# Patient Record
Sex: FEMALE | Race: BLACK OR AFRICAN AMERICAN | NOT HISPANIC OR LATINO | Employment: FULL TIME | URBAN - METROPOLITAN AREA
[De-identification: names, ages, dates, MRNs, and addresses within clinical notes are randomized per-mention and may not be internally consistent; named-entity substitution may affect disease eponyms.]

---

## 2020-09-23 ENCOUNTER — HISTORICAL (OUTPATIENT)
Dept: ADMINISTRATIVE | Facility: HOSPITAL | Age: 35
End: 2020-09-23

## 2020-09-23 LAB
ABS NEUT (OLG): 2.51 X10(3)/MCL (ref 2.1–9.2)
ALBUMIN SERPL-MCNC: 3.6 GM/DL (ref 3.4–5)
ALBUMIN/GLOB SERPL: 0.9 RATIO (ref 1.1–2)
ALP SERPL-CCNC: 89 UNIT/L (ref 45–117)
ALT SERPL-CCNC: 24 UNIT/L (ref 12–78)
AST SERPL-CCNC: 14 UNIT/L (ref 15–37)
BASOPHILS # BLD AUTO: 0 X10(3)/MCL (ref 0–0.2)
BASOPHILS NFR BLD AUTO: 1 %
BILIRUB SERPL-MCNC: 0.1 MG/DL (ref 0.2–1)
BILIRUBIN DIRECT+TOT PNL SERPL-MCNC: <0.1 MG/DL (ref 0–0.2)
BILIRUBIN DIRECT+TOT PNL SERPL-MCNC: ABNORMAL MG/DL
BUN SERPL-MCNC: 10 MG/DL (ref 7–18)
CALCIUM SERPL-MCNC: 9 MG/DL (ref 8.5–10.1)
CHLORIDE SERPL-SCNC: 107 MMOL/L (ref 98–107)
CO2 SERPL-SCNC: 26 MMOL/L (ref 21–32)
CREAT SERPL-MCNC: 0.6 MG/DL (ref 0.6–1.3)
DEPRECATED CALCIDIOL+CALCIFEROL SERPL-MC: 17 NG/ML (ref 30–80)
EOSINOPHIL # BLD AUTO: 0.2 X10(3)/MCL (ref 0–0.9)
EOSINOPHIL NFR BLD AUTO: 5 %
ERYTHROCYTE [DISTWIDTH] IN BLOOD BY AUTOMATED COUNT: 15.8 % (ref 11.5–14.5)
EST. AVERAGE GLUCOSE BLD GHB EST-MCNC: 111 MG/DL
GLOBULIN SER-MCNC: 4.1 GM/ML (ref 2.3–3.5)
GLUCOSE SERPL-MCNC: 86 MG/DL (ref 74–106)
HBA1C MFR BLD: 5.5 % (ref 4.2–6.3)
HCT VFR BLD AUTO: 36.4 % (ref 35–46)
HGB BLD-MCNC: 11.1 GM/DL (ref 12–16)
IMM GRANULOCYTES # BLD AUTO: 0.02 10*3/UL
IMM GRANULOCYTES NFR BLD AUTO: 0 %
LYMPHOCYTES # BLD AUTO: 1.7 X10(3)/MCL (ref 0.6–4.6)
LYMPHOCYTES NFR BLD AUTO: 35 %
MCH RBC QN AUTO: 24.6 PG (ref 26–34)
MCHC RBC AUTO-ENTMCNC: 30.5 GM/DL (ref 31–37)
MCV RBC AUTO: 80.7 FL (ref 80–100)
MONOCYTES # BLD AUTO: 0.4 X10(3)/MCL (ref 0.1–1.3)
MONOCYTES NFR BLD AUTO: 8 %
NEUTROPHILS # BLD AUTO: 2.51 X10(3)/MCL (ref 2.1–9.2)
NEUTROPHILS NFR BLD AUTO: 50 %
PLATELET # BLD AUTO: 282 X10(3)/MCL (ref 130–400)
PMV BLD AUTO: 9.6 FL (ref 7.4–10.4)
POTASSIUM SERPL-SCNC: 4 MMOL/L (ref 3.5–5.1)
PROT SERPL-MCNC: 7.7 GM/DL (ref 6.4–8.2)
RBC # BLD AUTO: 4.51 X10(6)/MCL (ref 4–5.2)
SODIUM SERPL-SCNC: 138 MMOL/L (ref 136–145)
TSH SERPL-ACNC: 0.81 MIU/L (ref 0.36–3.74)
WBC # SPEC AUTO: 5 X10(3)/MCL (ref 4.5–11)

## 2020-10-12 ENCOUNTER — HISTORICAL (OUTPATIENT)
Dept: INTERNAL MEDICINE | Facility: CLINIC | Age: 35
End: 2020-10-12

## 2020-10-12 LAB
APPEARANCE, UA: NORMAL
BACTERIA #/AREA URNS AUTO: ABNORMAL /HPF
BILIRUB UR QL STRIP: NEGATIVE
COLOR UR: YELLOW
GLUCOSE (UA): NEGATIVE
HAV IGM SERPL QL IA: NONREACTIVE
HBV CORE IGM SERPL QL IA: NONREACTIVE
HBV SURFACE AG SERPL QL IA: NONREACTIVE
HCV AB SERPL QL IA: NONREACTIVE
HGB UR QL STRIP: 0.1
HIV 1+2 AB+HIV1 P24 AG SERPL QL IA: NONREACTIVE
HYALINE CASTS #/AREA URNS LPF: ABNORMAL /LPF
KETONES UR QL STRIP: NEGATIVE
LEUKOCYTE ESTERASE UR QL STRIP: NEGATIVE
NITRITE UR QL STRIP: NEGATIVE
PH UR STRIP: 6.5 [PH] (ref 4.5–8)
PROT UR QL STRIP: NEGATIVE
RBC #/AREA URNS AUTO: ABNORMAL /HPF
SP GR UR STRIP: 1.02 (ref 1–1.03)
SQUAMOUS #/AREA URNS LPF: >100 /LPF
T PALLIDUM AB SER QL: NONREACTIVE
UROBILINOGEN UR STRIP-ACNC: NORMAL
WBC #/AREA URNS AUTO: ABNORMAL /HPF

## 2021-02-09 ENCOUNTER — HISTORICAL (OUTPATIENT)
Dept: ADMINISTRATIVE | Facility: HOSPITAL | Age: 36
End: 2021-02-09

## 2021-02-09 LAB
ABS NEUT (OLG): 2.84 X10(3)/MCL (ref 2.1–9.2)
ALBUMIN SERPL-MCNC: 3.7 GM/DL (ref 3.5–5)
ALBUMIN/GLOB SERPL: 1.1 RATIO (ref 1.1–2)
ALP SERPL-CCNC: 73 UNIT/L (ref 40–150)
ALT SERPL-CCNC: 15 UNIT/L (ref 0–55)
AST SERPL-CCNC: 23 UNIT/L (ref 5–34)
BASOPHILS # BLD AUTO: 0 X10(3)/MCL (ref 0–0.2)
BASOPHILS NFR BLD AUTO: 1 %
BILIRUB SERPL-MCNC: 0.5 MG/DL
BILIRUBIN DIRECT+TOT PNL SERPL-MCNC: 0.2 MG/DL (ref 0–0.5)
BILIRUBIN DIRECT+TOT PNL SERPL-MCNC: 0.3 MG/DL (ref 0–0.8)
BUN SERPL-MCNC: 9 MG/DL (ref 7–18.7)
CALCIUM SERPL-MCNC: 9.4 MG/DL (ref 8.4–10.2)
CHLORIDE SERPL-SCNC: 106 MMOL/L (ref 98–107)
CO2 SERPL-SCNC: 27 MMOL/L (ref 22–29)
CREAT SERPL-MCNC: 0.68 MG/DL (ref 0.55–1.02)
DEPRECATED CALCIDIOL+CALCIFEROL SERPL-MC: 16.5 NG/ML (ref 30–80)
EOSINOPHIL # BLD AUTO: 0.2 X10(3)/MCL (ref 0–0.9)
EOSINOPHIL NFR BLD AUTO: 4 %
ERYTHROCYTE [DISTWIDTH] IN BLOOD BY AUTOMATED COUNT: 13.5 % (ref 11.5–14.5)
GLOBULIN SER-MCNC: 3.5 GM/DL (ref 2.4–3.5)
GLUCOSE SERPL-MCNC: 94 MG/DL (ref 74–100)
HCT VFR BLD AUTO: 40.6 % (ref 35–46)
HGB BLD-MCNC: 12.6 GM/DL (ref 12–16)
IMM GRANULOCYTES # BLD AUTO: 0.03 10*3/UL
IMM GRANULOCYTES NFR BLD AUTO: 1 %
LYMPHOCYTES # BLD AUTO: 1.6 X10(3)/MCL (ref 0.6–4.6)
LYMPHOCYTES NFR BLD AUTO: 31 %
MCH RBC QN AUTO: 27 PG (ref 26–34)
MCHC RBC AUTO-ENTMCNC: 31 GM/DL (ref 31–37)
MCV RBC AUTO: 87.1 FL (ref 80–100)
MONOCYTES # BLD AUTO: 0.4 X10(3)/MCL (ref 0.1–1.3)
MONOCYTES NFR BLD AUTO: 8 %
NEUTROPHILS # BLD AUTO: 2.84 X10(3)/MCL (ref 2.1–9.2)
NEUTROPHILS NFR BLD AUTO: 56 %
PLATELET # BLD AUTO: 269 X10(3)/MCL (ref 130–400)
PMV BLD AUTO: 8.7 FL (ref 7.4–10.4)
POTASSIUM SERPL-SCNC: 3.8 MMOL/L (ref 3.5–5.1)
PROT SERPL-MCNC: 7.2 GM/DL (ref 6.4–8.3)
RBC # BLD AUTO: 4.66 X10(6)/MCL (ref 4–5.2)
SODIUM SERPL-SCNC: 140 MMOL/L (ref 136–145)
WBC # SPEC AUTO: 5.1 X10(3)/MCL (ref 4.5–11)

## 2022-04-10 ENCOUNTER — HISTORICAL (OUTPATIENT)
Dept: ADMINISTRATIVE | Facility: HOSPITAL | Age: 37
End: 2022-04-10

## 2022-04-29 VITALS
BODY MASS INDEX: 38.97 KG/M2 | OXYGEN SATURATION: 100 % | SYSTOLIC BLOOD PRESSURE: 119 MMHG | DIASTOLIC BLOOD PRESSURE: 82 MMHG | HEIGHT: 66 IN | WEIGHT: 242.5 LBS

## 2022-09-13 ENCOUNTER — OFFICE VISIT (OUTPATIENT)
Dept: FAMILY MEDICINE | Facility: CLINIC | Age: 37
End: 2022-09-13
Payer: MEDICAID

## 2022-09-13 VITALS
RESPIRATION RATE: 20 BRPM | OXYGEN SATURATION: 100 % | TEMPERATURE: 98 F | HEIGHT: 61 IN | BODY MASS INDEX: 48.9 KG/M2 | DIASTOLIC BLOOD PRESSURE: 89 MMHG | WEIGHT: 259 LBS | HEART RATE: 85 BPM | SYSTOLIC BLOOD PRESSURE: 126 MMHG

## 2022-09-13 DIAGNOSIS — R20.2 NUMBNESS AND TINGLING IN RIGHT HAND: ICD-10-CM

## 2022-09-13 DIAGNOSIS — N92.6 MISSED PERIOD: Primary | ICD-10-CM

## 2022-09-13 DIAGNOSIS — E66.9 OBESITY, UNSPECIFIED CLASSIFICATION, UNSPECIFIED OBESITY TYPE, UNSPECIFIED WHETHER SERIOUS COMORBIDITY PRESENT: ICD-10-CM

## 2022-09-13 DIAGNOSIS — M79.671 BILATERAL FOOT PAIN: ICD-10-CM

## 2022-09-13 DIAGNOSIS — R07.9 CHEST PAIN, UNSPECIFIED TYPE: ICD-10-CM

## 2022-09-13 DIAGNOSIS — M79.672 BILATERAL FOOT PAIN: ICD-10-CM

## 2022-09-13 DIAGNOSIS — I10 HYPERTENSION, UNSPECIFIED TYPE: ICD-10-CM

## 2022-09-13 DIAGNOSIS — G43.109 MIGRAINE WITH AURA AND WITHOUT STATUS MIGRAINOSUS, NOT INTRACTABLE: ICD-10-CM

## 2022-09-13 DIAGNOSIS — Z00.00 ENCOUNTER FOR WELLNESS EXAMINATION: ICD-10-CM

## 2022-09-13 DIAGNOSIS — Z72.0 TOBACCO USER: ICD-10-CM

## 2022-09-13 DIAGNOSIS — R20.0 NUMBNESS AND TINGLING IN RIGHT HAND: ICD-10-CM

## 2022-09-13 PROBLEM — D64.9 ANEMIA: Status: ACTIVE | Noted: 2022-09-13

## 2022-09-13 PROBLEM — D64.9 ANEMIA: Status: RESOLVED | Noted: 2022-09-13 | Resolved: 2022-09-13

## 2022-09-13 LAB
ALBUMIN SERPL-MCNC: 3.6 GM/DL (ref 3.5–5)
ALBUMIN/GLOB SERPL: 1.1 RATIO (ref 1.1–2)
ALP SERPL-CCNC: 68 UNIT/L (ref 40–150)
ALT SERPL-CCNC: 15 UNIT/L (ref 0–55)
APPEARANCE UR: CLEAR
AST SERPL-CCNC: 17 UNIT/L (ref 5–34)
B-HCG UR QL: NEGATIVE
BACTERIA #/AREA URNS AUTO: ABNORMAL /HPF
BASOPHILS # BLD AUTO: 0.04 X10(3)/MCL (ref 0–0.2)
BASOPHILS NFR BLD AUTO: 0.6 %
BILIRUB UR QL STRIP.AUTO: NEGATIVE MG/DL
BILIRUBIN DIRECT+TOT PNL SERPL-MCNC: 0.3 MG/DL
BUN SERPL-MCNC: 9.2 MG/DL (ref 7–18.7)
CALCIUM SERPL-MCNC: 9.3 MG/DL (ref 8.4–10.2)
CHLORIDE SERPL-SCNC: 105 MMOL/L (ref 98–107)
CO2 SERPL-SCNC: 24 MMOL/L (ref 22–29)
COLOR UR AUTO: YELLOW
CREAT SERPL-MCNC: 0.6 MG/DL (ref 0.55–1.02)
CTP QC/QA: YES
DEPRECATED CALCIDIOL+CALCIFEROL SERPL-MC: 20.5 NG/ML (ref 30–80)
EOSINOPHIL # BLD AUTO: 0.22 X10(3)/MCL (ref 0–0.9)
EOSINOPHIL NFR BLD AUTO: 3.4 %
ERYTHROCYTE [DISTWIDTH] IN BLOOD BY AUTOMATED COUNT: 15 % (ref 11.5–17)
EST. AVERAGE GLUCOSE BLD GHB EST-MCNC: 99.7 MG/DL
GFR SERPLBLD CREATININE-BSD FMLA CKD-EPI: >60 MLS/MIN/1.73/M2
GLOBULIN SER-MCNC: 3.3 GM/DL (ref 2.4–3.5)
GLUCOSE SERPL-MCNC: 88 MG/DL (ref 74–100)
GLUCOSE UR QL STRIP.AUTO: NORMAL MG/DL
HAV IGM SERPL QL IA: NONREACTIVE
HBA1C MFR BLD: 5.1 %
HBV CORE IGM SERPL QL IA: NONREACTIVE
HBV SURFACE AG SERPL QL IA: NONREACTIVE
HCT VFR BLD AUTO: 36.2 % (ref 37–47)
HCV AB SERPL QL IA: NONREACTIVE
HGB BLD-MCNC: 11.6 GM/DL (ref 12–16)
HIV 1+2 AB+HIV1 P24 AG SERPL QL IA: NONREACTIVE
HYALINE CASTS #/AREA URNS LPF: ABNORMAL /LPF
IMM GRANULOCYTES # BLD AUTO: 0.03 X10(3)/MCL (ref 0–0.04)
IMM GRANULOCYTES NFR BLD AUTO: 0.5 %
KETONES UR QL STRIP.AUTO: NEGATIVE MG/DL
LEUKOCYTE ESTERASE UR QL STRIP.AUTO: NEGATIVE UNIT/L
LYMPHOCYTES # BLD AUTO: 2.05 X10(3)/MCL (ref 0.6–4.6)
LYMPHOCYTES NFR BLD AUTO: 32 %
MCH RBC QN AUTO: 26 PG (ref 27–31)
MCHC RBC AUTO-ENTMCNC: 32 MG/DL (ref 33–36)
MCV RBC AUTO: 81.2 FL (ref 80–94)
MONOCYTES # BLD AUTO: 0.53 X10(3)/MCL (ref 0.1–1.3)
MONOCYTES NFR BLD AUTO: 8.3 %
MUCOUS THREADS URNS QL MICRO: ABNORMAL /LPF
NEUTROPHILS # BLD AUTO: 3.5 X10(3)/MCL (ref 2.1–9.2)
NEUTROPHILS NFR BLD AUTO: 55.2 %
NITRITE UR QL STRIP.AUTO: NEGATIVE
NRBC BLD AUTO-RTO: 0 %
PH UR STRIP.AUTO: 7.5 [PH]
PLATELET # BLD AUTO: 251 X10(3)/MCL (ref 130–400)
PMV BLD AUTO: 9.4 FL (ref 7.4–10.4)
POTASSIUM SERPL-SCNC: 4.3 MMOL/L (ref 3.5–5.1)
PROT SERPL-MCNC: 6.9 GM/DL (ref 6.4–8.3)
PROT UR QL STRIP.AUTO: ABNORMAL MG/DL
RBC # BLD AUTO: 4.46 X10(6)/MCL (ref 4.2–5.4)
RBC #/AREA URNS AUTO: ABNORMAL /HPF
RBC UR QL AUTO: NEGATIVE UNIT/L
SODIUM SERPL-SCNC: 138 MMOL/L (ref 136–145)
SP GR UR STRIP.AUTO: 1.02
SQUAMOUS #/AREA URNS LPF: ABNORMAL /HPF
T PALLIDUM AB SER QL: NONREACTIVE
T4 FREE SERPL-MCNC: 0.79 NG/DL (ref 0.7–1.48)
TSH SERPL-ACNC: 0.63 UIU/ML (ref 0.35–4.94)
UROBILINOGEN UR STRIP-ACNC: NORMAL MG/DL
VIT B12 SERPL-MCNC: 612 PG/ML (ref 213–816)
WBC # SPEC AUTO: 6.4 X10(3)/MCL (ref 4.5–11.5)
WBC #/AREA URNS AUTO: ABNORMAL /HPF

## 2022-09-13 PROCEDURE — 82306 VITAMIN D 25 HYDROXY: CPT | Performed by: NURSE PRACTITIONER

## 2022-09-13 PROCEDURE — 99385 PR PREVENTIVE VISIT,NEW,18-39: ICD-10-PCS | Mod: S$PBB,,, | Performed by: NURSE PRACTITIONER

## 2022-09-13 PROCEDURE — 3079F DIAST BP 80-89 MM HG: CPT | Mod: CPTII,,, | Performed by: NURSE PRACTITIONER

## 2022-09-13 PROCEDURE — 99215 OFFICE O/P EST HI 40 MIN: CPT | Mod: PBBFAC,PN | Performed by: NURSE PRACTITIONER

## 2022-09-13 PROCEDURE — 3008F PR BODY MASS INDEX (BMI) DOCUMENTED: ICD-10-PCS | Mod: CPTII,,, | Performed by: NURSE PRACTITIONER

## 2022-09-13 PROCEDURE — 3079F PR MOST RECENT DIASTOLIC BLOOD PRESSURE 80-89 MM HG: ICD-10-PCS | Mod: CPTII,,, | Performed by: NURSE PRACTITIONER

## 2022-09-13 PROCEDURE — 1160F RVW MEDS BY RX/DR IN RCRD: CPT | Mod: CPTII,,, | Performed by: NURSE PRACTITIONER

## 2022-09-13 PROCEDURE — 81001 URINALYSIS AUTO W/SCOPE: CPT | Performed by: NURSE PRACTITIONER

## 2022-09-13 PROCEDURE — 80074 ACUTE HEPATITIS PANEL: CPT | Performed by: NURSE PRACTITIONER

## 2022-09-13 PROCEDURE — 80053 COMPREHEN METABOLIC PANEL: CPT | Performed by: NURSE PRACTITIONER

## 2022-09-13 PROCEDURE — 3008F BODY MASS INDEX DOCD: CPT | Mod: CPTII,,, | Performed by: NURSE PRACTITIONER

## 2022-09-13 PROCEDURE — 86780 TREPONEMA PALLIDUM: CPT | Performed by: NURSE PRACTITIONER

## 2022-09-13 PROCEDURE — 81025 URINE PREGNANCY TEST: CPT | Mod: PBBFAC,PN | Performed by: NURSE PRACTITIONER

## 2022-09-13 PROCEDURE — 3074F PR MOST RECENT SYSTOLIC BLOOD PRESSURE < 130 MM HG: ICD-10-PCS | Mod: CPTII,,, | Performed by: NURSE PRACTITIONER

## 2022-09-13 PROCEDURE — 3074F SYST BP LT 130 MM HG: CPT | Mod: CPTII,,, | Performed by: NURSE PRACTITIONER

## 2022-09-13 PROCEDURE — 1159F MED LIST DOCD IN RCRD: CPT | Mod: CPTII,,, | Performed by: NURSE PRACTITIONER

## 2022-09-13 PROCEDURE — 99385 PREV VISIT NEW AGE 18-39: CPT | Mod: S$PBB,,, | Performed by: NURSE PRACTITIONER

## 2022-09-13 PROCEDURE — 99204 OFFICE O/P NEW MOD 45 MIN: CPT | Mod: 25,S$PBB,, | Performed by: NURSE PRACTITIONER

## 2022-09-13 PROCEDURE — 1159F PR MEDICATION LIST DOCUMENTED IN MEDICAL RECORD: ICD-10-PCS | Mod: CPTII,,, | Performed by: NURSE PRACTITIONER

## 2022-09-13 PROCEDURE — 83036 HEMOGLOBIN GLYCOSYLATED A1C: CPT | Performed by: NURSE PRACTITIONER

## 2022-09-13 PROCEDURE — 1160F PR REVIEW ALL MEDS BY PRESCRIBER/CLIN PHARMACIST DOCUMENTED: ICD-10-PCS | Mod: CPTII,,, | Performed by: NURSE PRACTITIONER

## 2022-09-13 PROCEDURE — 87389 HIV-1 AG W/HIV-1&-2 AB AG IA: CPT | Performed by: NURSE PRACTITIONER

## 2022-09-13 PROCEDURE — 82607 VITAMIN B-12: CPT | Performed by: NURSE PRACTITIONER

## 2022-09-13 PROCEDURE — 85025 COMPLETE CBC W/AUTO DIFF WBC: CPT | Performed by: NURSE PRACTITIONER

## 2022-09-13 PROCEDURE — 99204 PR OFFICE/OUTPT VISIT, NEW, LEVL IV, 45-59 MIN: ICD-10-PCS | Mod: 25,S$PBB,, | Performed by: NURSE PRACTITIONER

## 2022-09-13 PROCEDURE — 84443 ASSAY THYROID STIM HORMONE: CPT | Performed by: NURSE PRACTITIONER

## 2022-09-13 PROCEDURE — 36415 COLL VENOUS BLD VENIPUNCTURE: CPT | Performed by: NURSE PRACTITIONER

## 2022-09-13 PROCEDURE — 84439 ASSAY OF FREE THYROXINE: CPT | Performed by: NURSE PRACTITIONER

## 2022-09-13 RX ORDER — SUMATRIPTAN SUCCINATE 25 MG/1
TABLET ORAL
Qty: 25 TABLET | Refills: 1 | Status: SHIPPED | OUTPATIENT
Start: 2022-09-13 | End: 2023-01-30

## 2022-09-13 RX ORDER — NAPROXEN 500 MG/1
500 TABLET ORAL 2 TIMES DAILY
Qty: 60 TABLET | Refills: 3 | OUTPATIENT
Start: 2022-09-13 | End: 2022-10-25

## 2022-09-13 RX ORDER — HYDROCHLOROTHIAZIDE 12.5 MG/1
12.5 TABLET ORAL DAILY
Qty: 30 TABLET | Refills: 11 | Status: SHIPPED | OUTPATIENT
Start: 2022-09-13 | End: 2022-09-29 | Stop reason: DRUGHIGH

## 2022-09-13 NOTE — ASSESSMENT & PLAN NOTE
Trial Imitrex 25mg po repeat in  1hour x 1 if not better  ER for severe HA's not relieved by Imitrex  Headache education provided - including good sleep hygiene, stress management, medication overuse education provided - using more 3 OTC per week may worsen headaches, High intensity interval training has shown to reduce headache frequency. Low carb, high protein has shown to reduce headache frequency. Patient is instructed to keep headache diary.

## 2022-09-13 NOTE — ASSESSMENT & PLAN NOTE
NSAIDs, conservative treatment advised  Discussed plantar fasciitis, heel splints at night, rolling foot on ice.

## 2022-09-13 NOTE — PROGRESS NOTES
"Patient Name: Ida Saxena   : 1985  MRN: [id number]     SUBJECTIVE:  Ida Saxena is a 37 y.o. female here for Establish Care (Est. Pcp, GYN, breast pain, GERMANIA leg pain for a month)      22:  Here to establish care.  Unsure if she is pregnant or not. C/o breast pain bilaterally, needs GYN referral.  Also c/o pain to both legs for a month with swelling.  Was on losartan for bp but stopped taking on her own.  She gets occasional "Chest pulling' and has family history of CAD.  Denies diaphoresis, does report heart racing and skipping beats occasionally.  C/o right wrist pain and numbness to right hand.  Bilateral heel pain and swelling to ankles.  C/o migraine headaches to right side of temple and back of head that occur daily and associated with aura of lightheadedness and photosensitivity.  Denies vomiting.  Takes BC powder, excedrine migraine and advil without relief.        ROS:  Review of Systems   All other systems reviewed and are negative.    ALLERGIES:  Review of patient's allergies indicates:  No Known Allergies     HISTORY:  Past Medical History:   Diagnosis Date    Anemia 2022      History reviewed. No pertinent surgical history.  History reviewed. No pertinent family history.  Social History     Tobacco Use   Smoking Status Former    Types: Cigarettes   Smokeless Tobacco Never        OBJECTIVE:  Vital signs  Vitals:    22 1302   BP: 126/89   BP Location: Left arm   Patient Position: Sitting   BP Method: Medium (Automatic)   Pulse: 85   Resp: 20   Temp: 98.3 °F (36.8 °C)   TempSrc: Oral   SpO2: 100%   Weight: 117.5 kg (259 lb)   Height: 5' 0.6" (1.539 m)        Physical Exam  Constitutional:       General: She is awake.      Appearance: Normal appearance. She is well-developed.   HENT:      Head: Normocephalic and atraumatic.      Right Ear: Hearing, tympanic membrane, ear canal and external ear normal.      Left Ear: Hearing, tympanic membrane, ear canal and external ear " normal.      Nose: Nose normal.      Mouth/Throat:      Lips: Pink.      Mouth: Mucous membranes are moist.      Pharynx: Oropharynx is clear. Uvula midline.   Eyes:      Pupils: Pupils are equal, round, and reactive to light.   Cardiovascular:      Rate and Rhythm: Normal rate and regular rhythm.      Pulses: Normal pulses.      Heart sounds: Normal heart sounds.   Pulmonary:      Effort: Pulmonary effort is normal.      Breath sounds: Normal breath sounds.   Abdominal:      General: Abdomen is flat. Bowel sounds are normal.      Palpations: Abdomen is soft.   Musculoskeletal:         General: Normal range of motion.      Cervical back: Normal range of motion and neck supple.   Skin:     General: Skin is warm and dry.      Capillary Refill: Capillary refill takes less than 2 seconds.   Neurological:      General: No focal deficit present.      Mental Status: She is alert, oriented to person, place, and time and easily aroused. Mental status is at baseline.   Psychiatric:         Mood and Affect: Mood normal.         Behavior: Behavior is cooperative.        ASSESSMENT/PLAN:  1. Missed period  -     POCT Urine Pregnancy    2. Encounter for wellness examination  -     CBC Auto Differential  -     Comprehensive Metabolic Panel  -     Urinalysis  -     Hemoglobin A1C  -     TSH  -     T4, Free  -     Vitamin D  -     Vitamin B12  -     Hepatitis Panel, Acute  -     HIV 1/2 Ag/Ab (4th Gen)  -     SYPHILIS ANTIBODY (WITH REFLEX RPR)  -     Ambulatory referral/consult to Gynecology    3. Chest pain, unspecified type  Assessment & Plan:  Referred to Cardiology  ER for severe chest pain, sob, sweating, arm or neck pain    Orders:  -     Ambulatory referral/consult to Cardiology  -     naproxen (NAPROSYN) 500 MG tablet    4. Migraine with aura and without status migrainosus, not intractable  Assessment & Plan:  Trial Imitrex 25mg po repeat in  1hour x 1 if not better  ER for severe HA's not relieved by Imitrex  Headache  education provided - including good sleep hygiene, stress management, medication overuse education provided - using more 3 OTC per week may worsen headaches, High intensity interval training has shown to reduce headache frequency. Low carb, high protein has shown to reduce headache frequency. Patient is instructed to keep headache diary.      Orders:  -     sumatriptan (IMITREX) 25 MG Tab    5. Hypertension, unspecified type  Assessment & Plan:  HCTZ 12.5mg po daily  rtc 2 weeks for bp check and review of labs.    Orders:  -     hydroCHLOROthiazide (HYDRODIURIL) 12.5 MG Tab    6. Obesity, unspecified classification, unspecified obesity type, unspecified whether serious comorbidity present  Assessment & Plan:  BMI Body mass index is 49.59 kg/m².   Goal BMI <30.  Exercise 5 times a week for 30 minutes per day.  Avoid soda, simple sugars, excessive rice, potatoes or bread. Limit fast foods and fried foods.  Choose complex carbs in moderation (example: green vegetables, beans, oatmeal). Eat plenty of fresh fruits and vegetables with lean meats daily.  Do not skip meals. Eat a balanced portion size.  Avoid fad diets. Consider permanent healthy life style changes.           7. Tobacco user    8. Numbness and tingling in right hand  Assessment & Plan:  Splint at night, given in clinic  NSAIDs for pain      Orders:  -     naproxen (NAPROSYN) 500 MG tablet    9. Bilateral foot pain  Assessment & Plan:  NSAIDs, conservative treatment advised  Discussed plantar fasciitis, heel splints at night, rolling foot on ice.     Orders:  -     naproxen (NAPROSYN) 500 MG tablet        Recent Results (from the past 1008 hour(s))   POCT Urine Pregnancy    Collection Time: 09/13/22  1:16 PM   Result Value Ref Range    POC Preg Test, Ur Negative Negative     Acceptable Yes            Follow Up:  Follow up in about 2 weeks (around 9/27/2022) for BP check, Follow-up, Review of labs.           This note was created with the  assistance of a voice recognition software or phone dictation. There may be transcription errors as a result of using this technology however minimal. Effort has been made to assure accuracy of transcription but any obvious errors or omissions should be clarified with the author of the document

## 2022-09-13 NOTE — ASSESSMENT & PLAN NOTE
BMI Body mass index is 49.59 kg/m².   Goal BMI <30.  Exercise 5 times a week for 30 minutes per day.  Avoid soda, simple sugars, excessive rice, potatoes or bread. Limit fast foods and fried foods.  Choose complex carbs in moderation (example: green vegetables, beans, oatmeal). Eat plenty of fresh fruits and vegetables with lean meats daily.  Do not skip meals. Eat a balanced portion size.  Avoid fad diets. Consider permanent healthy life style changes.

## 2022-09-15 ENCOUNTER — TELEPHONE (OUTPATIENT)
Dept: FAMILY MEDICINE | Facility: CLINIC | Age: 37
End: 2022-09-15
Payer: MEDICAID

## 2022-09-15 LAB — PATH REV: NORMAL

## 2022-09-15 RX ORDER — ASPIRIN 325 MG
50000 TABLET, DELAYED RELEASE (ENTERIC COATED) ORAL
Qty: 12 CAPSULE | Refills: 0 | Status: SHIPPED | OUTPATIENT
Start: 2022-09-15 | End: 2023-01-30

## 2022-09-15 NOTE — PROGRESS NOTES
Vitamin d level was low on labs.  I am sending a prescription for high dose vitamin d to pharmacy.  Educate on increasing foods high in Vitamin D such as fish oil, cod liver oil, salmon, milk fortified with vitamin D.  RX Vitamin D3 35368 IU weekly x 12 weeks.  Complete entire 12 weeks of Vitamin D prescription.  After completion of prescription (12 weeks/3 months), begin taking Vitamin D 2000 I.U. tablets daily (purchase over the counter).  Repeat Vitamin D level as ordered.    3 month average of glucose was 5.1 which is not indicative of diabetes.      Anemia present on labs.  Adding iron to diet either by food or supplementation would be beneficial.  Anemia can contribute to fatigue. We will need to monitor this moving forward.  IF there is a vaginal bleeding issue with menstruation, this can contribute to anemia as well.

## 2022-09-15 NOTE — TELEPHONE ENCOUNTER
----- Message from CAIN Agosto sent at 9/15/2022 11:39 AM CDT -----  Vitamin d level was low on labs.  I am sending a prescription for high dose vitamin d to pharmacy.  Educate on increasing foods high in Vitamin D such as fish oil, cod liver oil, salmon, milk fortified with vitamin D.  RX Vitamin D3 25838 IU weekly x 12 weeks.  Complete entire 12 weeks of Vitamin D prescription.  After completion of prescription (12 weeks/3 months), begin taking Vitamin D 2000 I.U. tablets daily (purchase over the counter).  Repeat Vitamin D level as ordered.    3 month average of glucose was 5.1 which is not indicative of diabetes.      Anemia present on labs.  Adding iron to diet either by food or supplementation would be beneficial.  Anemia can contribute to fatigue. We will need to monitor this moving forward.  IF there is a vaginal bleeding issue with menstruation, this can contribute to anemia as well.

## 2022-09-15 NOTE — TELEPHONE ENCOUNTER
----- Message from CAIN Agosto sent at 9/15/2022 11:39 AM CDT -----  Vitamin d level was low on labs.  I am sending a prescription for high dose vitamin d to pharmacy.  Educate on increasing foods high in Vitamin D such as fish oil, cod liver oil, salmon, milk fortified with vitamin D.  RX Vitamin D3 19915 IU weekly x 12 weeks.  Complete entire 12 weeks of Vitamin D prescription.  After completion of prescription (12 weeks/3 months), begin taking Vitamin D 2000 I.U. tablets daily (purchase over the counter).  Repeat Vitamin D level as ordered.    3 month average of glucose was 5.1 which is not indicative of diabetes.      Anemia present on labs.  Adding iron to diet either by food or supplementation would be beneficial.  Anemia can contribute to fatigue. We will need to monitor this moving forward.  IF there is a vaginal bleeding issue with menstruation, this can contribute to anemia as well.

## 2022-09-15 NOTE — PROGRESS NOTES
Abnormal vitamin d level 20.4 on labs.  Sending prescription to pharmacy for high dose vitamin d to be taken once weekly for 12 weeks.  Will repeat vitamin d level in 3 months.     Nataly

## 2022-09-29 ENCOUNTER — OFFICE VISIT (OUTPATIENT)
Dept: FAMILY MEDICINE | Facility: CLINIC | Age: 37
End: 2022-09-29
Payer: MEDICAID

## 2022-09-29 VITALS
HEART RATE: 95 BPM | DIASTOLIC BLOOD PRESSURE: 90 MMHG | TEMPERATURE: 98 F | OXYGEN SATURATION: 100 % | BODY MASS INDEX: 42.75 KG/M2 | RESPIRATION RATE: 20 BRPM | WEIGHT: 266 LBS | HEIGHT: 66 IN | SYSTOLIC BLOOD PRESSURE: 136 MMHG

## 2022-09-29 DIAGNOSIS — I10 HYPERTENSION, UNSPECIFIED TYPE: ICD-10-CM

## 2022-09-29 DIAGNOSIS — Z00.00 ENCOUNTER FOR WELLNESS EXAMINATION: Primary | ICD-10-CM

## 2022-09-29 PROCEDURE — 3080F PR MOST RECENT DIASTOLIC BLOOD PRESSURE >= 90 MM HG: ICD-10-PCS | Mod: CPTII,,, | Performed by: NURSE PRACTITIONER

## 2022-09-29 PROCEDURE — 99214 PR OFFICE/OUTPT VISIT, EST, LEVL IV, 30-39 MIN: ICD-10-PCS | Mod: S$PBB,,, | Performed by: NURSE PRACTITIONER

## 2022-09-29 PROCEDURE — 1160F PR REVIEW ALL MEDS BY PRESCRIBER/CLIN PHARMACIST DOCUMENTED: ICD-10-PCS | Mod: CPTII,,, | Performed by: NURSE PRACTITIONER

## 2022-09-29 PROCEDURE — 99214 OFFICE O/P EST MOD 30 MIN: CPT | Mod: S$PBB,,, | Performed by: NURSE PRACTITIONER

## 2022-09-29 PROCEDURE — 1159F PR MEDICATION LIST DOCUMENTED IN MEDICAL RECORD: ICD-10-PCS | Mod: CPTII,,, | Performed by: NURSE PRACTITIONER

## 2022-09-29 PROCEDURE — 3075F PR MOST RECENT SYSTOLIC BLOOD PRESS GE 130-139MM HG: ICD-10-PCS | Mod: CPTII,,, | Performed by: NURSE PRACTITIONER

## 2022-09-29 PROCEDURE — 1160F RVW MEDS BY RX/DR IN RCRD: CPT | Mod: CPTII,,, | Performed by: NURSE PRACTITIONER

## 2022-09-29 PROCEDURE — 3008F BODY MASS INDEX DOCD: CPT | Mod: CPTII,,, | Performed by: NURSE PRACTITIONER

## 2022-09-29 PROCEDURE — 3008F PR BODY MASS INDEX (BMI) DOCUMENTED: ICD-10-PCS | Mod: CPTII,,, | Performed by: NURSE PRACTITIONER

## 2022-09-29 PROCEDURE — 1159F MED LIST DOCD IN RCRD: CPT | Mod: CPTII,,, | Performed by: NURSE PRACTITIONER

## 2022-09-29 PROCEDURE — 3080F DIAST BP >= 90 MM HG: CPT | Mod: CPTII,,, | Performed by: NURSE PRACTITIONER

## 2022-09-29 PROCEDURE — 3075F SYST BP GE 130 - 139MM HG: CPT | Mod: CPTII,,, | Performed by: NURSE PRACTITIONER

## 2022-09-29 PROCEDURE — 99213 OFFICE O/P EST LOW 20 MIN: CPT | Mod: PBBFAC,PN | Performed by: NURSE PRACTITIONER

## 2022-09-29 RX ORDER — HYDROCHLOROTHIAZIDE 25 MG/1
25 TABLET ORAL DAILY
Qty: 30 TABLET | Refills: 6 | Status: SHIPPED | OUTPATIENT
Start: 2022-09-29 | End: 2023-08-09 | Stop reason: SDUPTHER

## 2022-09-29 NOTE — ASSESSMENT & PLAN NOTE
Increase HCTZ to 25mg po daily.   RTC 1 month for recheck BP  Low Sodium Diet (Dash Diet - less than 2 grams of sodium per day).  Monitor Blood Pressure daily and log. Report any consistent numbers greater than 140/90.  Smoking Cessation encouraged to aid in BP reduction.  Maintain healthy weight with goal BMI <30. Exercise 30 minutes per day 5 days per week

## 2022-09-29 NOTE — PROGRESS NOTES
"Patient Name: Ida Saxena   : 1985  MRN: [id number]     SUBJECTIVE:  Ida Saxena is a 37 y.o. female here for Follow-up (2 wk f/u B/P check)    22:  FU BP. I added HCTZ to regimen last visit and told to return in 2 weeks for BP check.     22:  Here to establish care.  Unsure if she is pregnant or not. C/o breast pain bilaterally, needs GYN referral.  Also c/o pain to both legs for a month with swelling.  Was on losartan for bp but stopped taking on her own.  She gets occasional "Chest pulling' and has family history of CAD.  Denies diaphoresis, does report heart racing and skipping beats occasionally.  C/o right wrist pain and numbness to right hand.  Bilateral heel pain and swelling to ankles.  C/o migraine headaches to right side of temple and back of head that occur daily and associated with aura of lightheadedness and photosensitivity.  Denies vomiting.  Takes BC powder, excedrine migraine and advil without relief.        ALLERGIES: Review of patient's allergies indicates:  No Known Allergies      ROS:  Review of Systems   Constitutional: Negative.    HENT: Negative.     Eyes: Negative.    Respiratory: Negative.     Cardiovascular: Negative.    Gastrointestinal: Negative.    Genitourinary: Negative.    Musculoskeletal: Negative.    Skin: Negative.    Neurological: Negative.    Endo/Heme/Allergies: Negative.    Psychiatric/Behavioral: Negative.     All other systems reviewed and are negative.      OBJECTIVE:  Vital signs  Vitals:    22 0926   BP: (!) 136/90   BP Location: Left arm   Patient Position: Sitting   BP Method: Large (Manual)   Pulse: 95   Resp: 20   Temp: 98.4 °F (36.9 °C)   TempSrc: Oral   SpO2: 100%   Weight: 120.7 kg (266 lb)   Height: 5' 6" (1.676 m)      Body mass index is 42.93 kg/m².    PHYSICAL EXAM:   Physical Exam  Vitals and nursing note reviewed.   HENT:      Head: Normocephalic and atraumatic.   Cardiovascular:      Rate and Rhythm: Normal rate and " regular rhythm.      Pulses: Normal pulses.      Heart sounds: Normal heart sounds.   Pulmonary:      Breath sounds: Normal breath sounds.   Musculoskeletal:         General: Normal range of motion.      Cervical back: Normal range of motion.   Skin:     General: Skin is warm and dry.   Neurological:      General: No focal deficit present.      Mental Status: She is alert and oriented to person, place, and time.   Psychiatric:         Mood and Affect: Mood normal.        ASSESSMENT/PLAN:  1. Hypertension, unspecified type  Overview:  BP Readings from Last 3 Encounters:   09/29/22 (!) 136/90   09/13/22 126/89   10/20/20 119/82         Assessment & Plan:  Increase HCTZ to 25mg po daily.   RTC 1 month for recheck BP  Low Sodium Diet (Dash Diet - less than 2 grams of sodium per day).  Monitor Blood Pressure daily and log. Report any consistent numbers greater than 140/90.  Smoking Cessation encouraged to aid in BP reduction.  Maintain healthy weight with goal BMI <30. Exercise 30 minutes per day 5 days per week      Orders:  -     hydroCHLOROthiazide (HYDRODIURIL) 25 MG tablet         RESULTS:  Recent Results (from the past 1008 hour(s))   POCT Urine Pregnancy    Collection Time: 09/13/22  1:16 PM   Result Value Ref Range    POC Preg Test, Ur Negative Negative     Acceptable Yes    Comprehensive Metabolic Panel    Collection Time: 09/13/22  1:59 PM   Result Value Ref Range    Sodium Level 138 136 - 145 mmol/L    Potassium Level 4.3 3.5 - 5.1 mmol/L    Chloride 105 98 - 107 mmol/L    Carbon Dioxide 24 22 - 29 mmol/L    Glucose Level 88 74 - 100 mg/dL    Blood Urea Nitrogen 9.2 7.0 - 18.7 mg/dL    Creatinine 0.60 0.55 - 1.02 mg/dL    Calcium Level Total 9.3 8.4 - 10.2 mg/dL    Protein Total 6.9 6.4 - 8.3 gm/dL    Albumin Level 3.6 3.5 - 5.0 gm/dL    Globulin 3.3 2.4 - 3.5 gm/dL    Albumin/Globulin Ratio 1.1 1.1 - 2.0 ratio    Bilirubin Total 0.3 <=1.5 mg/dL    Alkaline Phosphatase 68 40 - 150 unit/L     Alanine Aminotransferase 15 0 - 55 unit/L    Aspartate Aminotransferase 17 5 - 34 unit/L    eGFR >60 mls/min/1.73/m2   Urinalysis    Collection Time: 09/13/22  1:59 PM   Result Value Ref Range    Color, UA Yellow Yellow, Colorless, Other, Clear    Appearance, UA Clear Clear    Specific Gravity, UA 1.024     pH, UA 7.5 5.0, 5.5, 6.0, 6.5, 7.0, 7.5, 8.0, 8.5    Protein, UA Trace (A) Negative, 300  mg/dL    Glucose, UA Normal Negative, Normal mg/dL    Ketones, UA Negative Negative, +1, +2, +3, +4, +5, >=160, >=80 mg/dL    Blood, UA Negative Negative unit/L    Bilirubin, UA Negative Negative mg/dL    Urobilinogen, UA Normal 0.2, 1.0, Normal mg/dL    Nitrites, UA Negative Negative    Leukocyte Esterase, UA Negative Negative, 75  unit/L    WBC, UA 0-5 None Seen, 0-2, 3-5, 0-5 /HPF    Bacteria, UA Trace (A) None Seen /HPF    Squamous Epithelial Cells, UA Many (A) None Seen /HPF    Mucous, UA Trace (A) None Seen /LPF    Hyaline Casts, UA 0-2 (A) None Seen /lpf    RBC, UA 0-5 None Seen, 0-2, 3-5, 0-5 /HPF   Hemoglobin A1C    Collection Time: 09/13/22  1:59 PM   Result Value Ref Range    Hemoglobin A1c 5.1 <=7.0 %    Estimated Average Glucose 99.7 mg/dL   TSH    Collection Time: 09/13/22  1:59 PM   Result Value Ref Range    Thyroid Stimulating Hormone 0.6313 0.3500 - 4.9400 uIU/mL   T4, Free    Collection Time: 09/13/22  1:59 PM   Result Value Ref Range    Thyroxine Free 0.79 0.70 - 1.48 ng/dL   Vitamin D    Collection Time: 09/13/22  1:59 PM   Result Value Ref Range    Vit D 25 OH 20.5 (L) 30.0 - 80.0 ng/mL   Vitamin B12    Collection Time: 09/13/22  1:59 PM   Result Value Ref Range    Vitamin B12 Level 612 213 - 816 pg/mL   Hepatitis Panel, Acute    Collection Time: 09/13/22  1:59 PM   Result Value Ref Range    Hepatitis A IgM Nonreactive Nonreactive    Hepatitis B Core IgM Nonreactive Nonreactive    Hepatitis B Surface Antigen Nonreactive Nonreactive    Hepatitis C Antibody Nonreactive Nonreactive   HIV 1/2 Ag/Ab (4th Gen)     Collection Time: 09/13/22  1:59 PM   Result Value Ref Range    HIV Nonreactive Nonreactive   SYPHILIS ANTIBODY (WITH REFLEX RPR)    Collection Time: 09/13/22  1:59 PM   Result Value Ref Range    Syphilis Antibody Nonreactive Nonreactive, Equivocal   CBC with Differential    Collection Time: 09/13/22  1:59 PM   Result Value Ref Range    WBC 6.4 4.5 - 11.5 x10(3)/mcL    RBC 4.46 4.20 - 5.40 x10(6)/mcL    Hgb 11.6 (L) 12.0 - 16.0 gm/dL    Hct 36.2 (L) 37.0 - 47.0 %    MCV 81.2 80.0 - 94.0 fL    MCH 26.0 (L) 27.0 - 31.0 pg    MCHC 32.0 (L) 33.0 - 36.0 mg/dL    RDW 15.0 11.5 - 17.0 %    Platelet 251 130 - 400 x10(3)/mcL    MPV 9.4 7.4 - 10.4 fL    Neut % 55.2 %    Lymph % 32.0 %    Mono % 8.3 %    Eos % 3.4 %    Basophil % 0.6 %    Lymph # 2.05 0.6 - 4.6 x10(3)/mcL    Neut # 3.5 2.1 - 9.2 x10(3)/mcL    Mono # 0.53 0.1 - 1.3 x10(3)/mcL    Eos # 0.22 0 - 0.9 x10(3)/mcL    Baso # 0.04 0 - 0.2 x10(3)/mcL    IG# 0.03 0 - 0.04 x10(3)/mcL    IG% 0.5 %    NRBC% 0.0 %   Pathologist Interpretation    Collection Time: 09/13/22  1:59 PM   Result Value Ref Range    Pathology Review       No serological evidence of recent or past hepatitis A, B, or C infection.    Brigid Carcamo MD           Follow Up:  Follow up in about 1 month (around 10/29/2022) for Follow-up, BP check.         This note was created with the assistance of a voice recognition software or phone dictation. There may be transcription errors as a result of using this technology however minimal. Effort has been made to assure accuracy of transcription but any obvious errors or omissions should be clarified with the author of the document

## 2022-10-12 ENCOUNTER — TELEPHONE (OUTPATIENT)
Dept: FAMILY MEDICINE | Facility: CLINIC | Age: 37
End: 2022-10-12
Payer: MEDICAID

## 2022-10-12 DIAGNOSIS — Z12.31 ENCOUNTER FOR SCREENING MAMMOGRAM FOR MALIGNANT NEOPLASM OF BREAST: Primary | ICD-10-CM

## 2022-10-12 NOTE — TELEPHONE ENCOUNTER
----- Message from Nika Warren sent at 10/12/2022  1:17 PM CDT -----  Regarding: Phone call request  Pt is requesting a phone call from nurse

## 2022-10-14 NOTE — TELEPHONE ENCOUNTER
Please inform this patient that she is not 40 years of age.  Her insurance may  not cover the MMG unless she has strong family history of breast cancer.     Nataly        Orders Placed This Encounter   Procedures    Mammo Digital Screening Bilat     Standing Status:   Future     Standing Expiration Date:   1/14/2023     Order Specific Question:   Reason for Exam:     Answer:   screening     Order Specific Question:   Is the patient pregnant?     Answer:   No     Order Specific Question:   Has patient had radiation?     Answer:   No     Order Specific Question:   Has the patient had chemotherapy?     Answer:   No     Order Specific Question:   May the Radiologist modify the order per protocol to meet the clinical needs of the patient?     Answer:   Yes     Order Specific Question:   Release to patient     Answer:   Immediate     Order Specific Question:   [facility] ONLY: Performing/Resulting Location     Answer:   [facility]

## 2022-10-15 ENCOUNTER — HOSPITAL ENCOUNTER (EMERGENCY)
Facility: HOSPITAL | Age: 37
Discharge: HOME | End: 2022-10-16
Attending: EMERGENCY MEDICINE
Payer: MEDICAID

## 2022-10-15 VITALS
HEIGHT: 66 IN | WEIGHT: 266 LBS | TEMPERATURE: 98 F | SYSTOLIC BLOOD PRESSURE: 125 MMHG | OXYGEN SATURATION: 100 % | HEART RATE: 97 BPM | DIASTOLIC BLOOD PRESSURE: 93 MMHG | RESPIRATION RATE: 20 BRPM | BODY MASS INDEX: 42.75 KG/M2

## 2022-10-15 DIAGNOSIS — S05.01XA ABRASION OF RIGHT CORNEA, INITIAL ENCOUNTER: Primary | ICD-10-CM

## 2022-10-15 PROCEDURE — 99283 EMERGENCY DEPT VISIT LOW MDM: CPT

## 2022-10-15 RX ORDER — TETRACAINE HYDROCHLORIDE 5 MG/ML
2 SOLUTION OPHTHALMIC
Status: COMPLETED | OUTPATIENT
Start: 2022-10-16 | End: 2022-10-15

## 2022-10-15 RX ADMIN — TETRACAINE HYDROCHLORIDE 2 DROP: 5 SOLUTION OPHTHALMIC at 11:10

## 2022-10-15 NOTE — Clinical Note
"Ida Guerrero" Hemanth was seen and treated in our emergency department on 10/15/2022.  She may return to work on 10/17/2022.       If you have any questions or concerns, please don't hesitate to call.       RN    "

## 2022-10-16 PROCEDURE — 25000003 PHARM REV CODE 250: Performed by: EMERGENCY MEDICINE

## 2022-10-16 RX ORDER — ERYTHROMYCIN 5 MG/G
OINTMENT OPHTHALMIC
Status: COMPLETED | OUTPATIENT
Start: 2022-10-16 | End: 2022-10-16

## 2022-10-16 RX ADMIN — ERYTHROMYCIN: 5 OINTMENT OPHTHALMIC at 12:10

## 2022-10-16 RX ADMIN — FLUORESCEIN SODIUM 1 EACH: 1 STRIP OPHTHALMIC at 12:10

## 2022-10-16 NOTE — ED PROVIDER NOTES
Encounter Date: 10/15/2022       History     Chief Complaint   Patient presents with    Eye Pain     Feels like something is in my right eye for past 5 hours       37-year-old female states she feels like symptoms in her right eye for the last 5 hours.  No vision complaints.  She thinks maybe it is makeup or an eyelash in her eye.  No history of eye problems in the past and she does not wear contact lenses. Pt is intoxicated and plans to go out after leaving here      Review of patient's allergies indicates:  No Known Allergies  Past Medical History:   Diagnosis Date    Anemia 09/13/2022    Hypertension      History reviewed. No pertinent surgical history.  History reviewed. No pertinent family history.  Social History     Tobacco Use    Smoking status: Former     Types: Cigarettes    Smokeless tobacco: Never   Substance Use Topics    Alcohol use: Yes    Drug use: Never     Review of Systems   Eyes:  Positive for pain.   All other systems reviewed and are negative.    Physical Exam     Initial Vitals [10/15/22 2346]   BP Pulse Resp Temp SpO2   (!) 125/93 97 20 98.2 °F (36.8 °C) 100 %      MAP       --         Physical Exam    Constitutional: She appears well-developed and well-nourished.   Eyes: EOM are normal. Pupils are equal, round, and reactive to light.   Right eye photophobia on arrival relieved by tetracaine.  On fluorescein exam, small abrasion seen at 9:00 a.m. and 12:00 p.m. to the cornea.  No foreign body seen but she has a lot of mascara eye makeup debris in her eyelashes.  Eyelid was inverted and no foreign body found.  No foreign body found under the lower lid.  On slit-lamp exam, I a again visualized the 2 small abrasions.  Anterior chamber is clear, no foreign body found.  Eye was irrigated with 20 mL of normal saline and erythromycin ointment was placed.         ED Course   Procedures  Labs Reviewed - No data to display       Imaging Results    None          Medications   fluorescein ophthalmic  strip 1 each (1 each Both Eyes Given 10/16/22 0000)   TETRAcaine HCl (PF) 0.5 % Drop 2 drop (2 drops Right Eye Given 10/15/22 4376)   erythromycin 5 mg/gram (0.5 %) ophthalmic ointment ( Right Eye Given 10/16/22 0019)                              Clinical Impression:   Final diagnoses:  [S05.01XA] Abrasion of right cornea, initial encounter (Primary)        ED Disposition Condition    Discharge Stable          ED Prescriptions    None       Follow-up Information    None          Ameena North MD  10/16/22 9955

## 2022-10-16 NOTE — DISCHARGE INSTRUCTIONS
When you get home, remove your eye makeup carefully.  Flush your eye with the saline provided and reapply the antibiotic ointment before bedtime.  Your eye should be feeling much better by tomorrow and if not fully improved by Monday, follow-up with an ophthalmologist.

## 2022-10-25 ENCOUNTER — HOSPITAL ENCOUNTER (EMERGENCY)
Facility: HOSPITAL | Age: 37
Discharge: HOME | End: 2022-10-25
Attending: EMERGENCY MEDICINE
Payer: MEDICAID

## 2022-10-25 VITALS
WEIGHT: 265 LBS | OXYGEN SATURATION: 100 % | BODY MASS INDEX: 42.59 KG/M2 | HEART RATE: 83 BPM | RESPIRATION RATE: 18 BRPM | HEIGHT: 66 IN | SYSTOLIC BLOOD PRESSURE: 146 MMHG | TEMPERATURE: 98 F | DIASTOLIC BLOOD PRESSURE: 93 MMHG

## 2022-10-25 DIAGNOSIS — M54.9 BACK PAIN, UNSPECIFIED BACK LOCATION, UNSPECIFIED BACK PAIN LATERALITY, UNSPECIFIED CHRONICITY: Primary | ICD-10-CM

## 2022-10-25 DIAGNOSIS — R52 PAIN: ICD-10-CM

## 2022-10-25 PROCEDURE — 25000003 PHARM REV CODE 250: Performed by: EMERGENCY MEDICINE

## 2022-10-25 PROCEDURE — 99284 EMERGENCY DEPT VISIT MOD MDM: CPT | Mod: 25

## 2022-10-25 PROCEDURE — 96372 THER/PROPH/DIAG INJ SC/IM: CPT | Performed by: EMERGENCY MEDICINE

## 2022-10-25 PROCEDURE — 63600175 PHARM REV CODE 636 W HCPCS: Performed by: EMERGENCY MEDICINE

## 2022-10-25 RX ORDER — MORPHINE SULFATE 4 MG/ML
4 INJECTION, SOLUTION INTRAMUSCULAR; INTRAVENOUS
Status: COMPLETED | OUTPATIENT
Start: 2022-10-25 | End: 2022-10-25

## 2022-10-25 RX ORDER — NAPROXEN 500 MG/1
500 TABLET ORAL 2 TIMES DAILY WITH MEALS
Qty: 30 TABLET | Refills: 0 | Status: SHIPPED | OUTPATIENT
Start: 2022-10-25 | End: 2022-10-28

## 2022-10-25 RX ORDER — ONDANSETRON 4 MG/1
4 TABLET, ORALLY DISINTEGRATING ORAL
Status: COMPLETED | OUTPATIENT
Start: 2022-10-25 | End: 2022-10-25

## 2022-10-25 RX ADMIN — MORPHINE SULFATE 4 MG: 4 INJECTION INTRAVENOUS at 01:10

## 2022-10-25 RX ADMIN — ONDANSETRON 4 MG: 4 TABLET, ORALLY DISINTEGRATING ORAL at 01:10

## 2022-10-25 RX ADMIN — MORPHINE SULFATE 4 MG: 4 INJECTION INTRAVENOUS at 02:10

## 2022-10-25 NOTE — ED PROVIDER NOTES
Encounter Date: 10/25/2022       History     Chief Complaint   Patient presents with    Shortness of Breath    Back Pain     37-year-old female states she is having pain in her midback bilaterally in the broth strap area of the posterior thorax in the central half of her back (50%).  She states the pain began this morning and is worse when she takes a deep breath and also when she twists or turns.  She states she does not have a cough.  She states she has never had pain in this area of her back before.  She denies trauma.  She denies fevers chills and sweats.    Review of patient's allergies indicates:  No Known Allergies  Past Medical History:   Diagnosis Date    Anemia 09/13/2022    Hypertension      No past surgical history on file.  No family history on file.  Social History     Tobacco Use    Smoking status: Former     Types: Cigarettes    Smokeless tobacco: Never   Substance Use Topics    Alcohol use: Yes    Drug use: Never     Review of Systems   Constitutional:  Negative for fever.   HENT:  Negative for sore throat.    Respiratory:  Negative for shortness of breath.    Cardiovascular:  Negative for chest pain.   Gastrointestinal:  Negative for nausea.   Genitourinary:  Negative for dysuria.   Musculoskeletal:  Positive for back pain.   Skin:  Negative for rash.   Neurological:  Negative for weakness.   Hematological:  Does not bruise/bleed easily.   All other systems reviewed and are negative.    Physical Exam     Initial Vitals [10/25/22 0025]   BP Pulse Resp Temp SpO2   (!) 147/110 83 18 98.4 °F (36.9 °C) 100 %      MAP       --         Physical Exam    Nursing note and vitals reviewed.  Constitutional: She appears well-developed. No distress.   Patient is comfortable appearing sitting up in the bed.  She is in no acute distress.  She is nontoxic appearing.   HENT:   Mouth/Throat: Oropharynx is clear and moist.   Eyes: Conjunctivae are normal.   Cardiovascular:  Normal rate, regular rhythm and normal heart  sounds.     Exam reveals no gallop and no friction rub.       No murmur heard.  Pulmonary/Chest: Breath sounds normal. No respiratory distress. She has no wheezes. She has no rhonchi. She has no rales.   Abdominal: Abdomen is soft. Bowel sounds are normal. She exhibits no distension. There is no abdominal tenderness. There is no guarding.   Musculoskeletal:         General: No edema.      Comments: There is tenderness to the soft tissues of the mid back at the T7 area in the paraspinous and adjacent lateral areas.  The tenderness stops at approximately the area of the mid scapular line bilaterally.  There is no one area of her back that is more tender than the other.  There is no erythema.  There is no point spine tenderness.     Lymphadenopathy:     She has no cervical adenopathy.   Neurological: She is alert. She displays a negative Romberg sign. Coordination and gait normal. GCS eye subscore is 4. GCS verbal subscore is 5. GCS motor subscore is 6.   Skin: Skin is warm.   Psychiatric: She has a normal mood and affect.       ED Course   Procedures  Labs Reviewed - No data to display       Imaging Results              X-Ray Chest 1 View (In process)  Result time 10/25/22 01:23:48                  X-Rays:   Independently Interpreted Readings:   Other Readings:  Lungs clear.  No infiltrate/ptx/mass.  Mediastinum is normal.  NAF  Medications   morphine injection 4 mg (4 mg Intramuscular Given 10/25/22 0124)   ondansetron disintegrating tablet 4 mg (4 mg Oral Given 10/25/22 0124)   morphine injection 4 mg (4 mg Intramuscular Given 10/25/22 0202)     Medical Decision Making:   Differential Diagnosis:   Aortic aneurysm, aortic dissection, pulmonary embolus, pneumothorax, vertebral fracture, infection, musculoskeletal pain  Clinical Tests:   Radiological Study: Reviewed  ED Management:  I accessed  aware and her overdose risk score is 250.  Patient has reproducible posterior thoracic wall tenderness.  Will check x-ray.   There is no indication for a pulmonary embolus workup and some body who is having bilateral pain with an oxygen saturation of 100% on room air in the absence of tachycardia and tachypnea.  And aortic aneurysm/dissection would not results and reproducible posterior thoracic wall tenderness.  Palpation of the area of complaint completely reproduces her pain.  She discussed having chronic pain issues after having an epidural years ago and states that muscle relaxers do not help with her pain.  She states she does not tolerate them well.    I discussed the results of the chest x-ray with the patient who states that she is feeling better that she still is having some pain.  She would like 1 more dose of pain medication before she leaves.  She appears very relaxed and is showing no sign of pain physically.                        Clinical Impression:   Final diagnoses:  [R52] Pain  [M54.9] Back pain, unspecified back location, unspecified back pain laterality, unspecified chronicity - musculoskeletal (Primary)        ED Disposition Condition    Discharge Stable          ED Prescriptions       Medication Sig Dispense Start Date End Date Auth. Provider    naproxen (NAPROSYN) 500 MG tablet Take 1 tablet (500 mg total) by mouth 2 (two) times daily with meals. 30 tablet 10/25/2022 -- Gorge Aragon Jr., MD          Follow-up Information       Follow up With Specialties Details Why Contact Info    CAIN Agosto Nurse Practitioner In 2 days  [address] [place] [id number]  [phone]               Gorge Aragon Jr., MD  10/25/22 0202       Gorge Aragon Jr., MD  10/25/22 0203       Gorge Aragon Jr., MD  10/25/22 8945

## 2022-10-27 ENCOUNTER — HOSPITAL ENCOUNTER (OUTPATIENT)
Dept: RADIOLOGY | Facility: HOSPITAL | Age: 37
Discharge: HOME | End: 2022-10-27
Attending: NURSE PRACTITIONER
Payer: MEDICAID

## 2022-10-27 DIAGNOSIS — Z12.31 ENCOUNTER FOR SCREENING MAMMOGRAM FOR MALIGNANT NEOPLASM OF BREAST: ICD-10-CM

## 2022-10-27 PROCEDURE — 77067 SCR MAMMO BI INCL CAD: CPT | Mod: 26,,, | Performed by: RADIOLOGY

## 2022-10-27 PROCEDURE — 77067 SCR MAMMO BI INCL CAD: CPT | Mod: TC

## 2022-10-27 PROCEDURE — 77063 BREAST TOMOSYNTHESIS BI: CPT | Mod: 26,,, | Performed by: RADIOLOGY

## 2022-10-27 PROCEDURE — 77067 MAMMO DIGITAL SCREENING BILAT WITH TOMO: ICD-10-PCS | Mod: 26,,, | Performed by: RADIOLOGY

## 2022-10-27 PROCEDURE — 77063 MAMMO DIGITAL SCREENING BILAT WITH TOMO: ICD-10-PCS | Mod: 26,,, | Performed by: RADIOLOGY

## 2022-10-28 ENCOUNTER — TELEPHONE (OUTPATIENT)
Dept: BARIATRICS | Facility: CLINIC | Age: 37
End: 2022-10-28
Payer: MEDICAID

## 2022-10-28 ENCOUNTER — OFFICE VISIT (OUTPATIENT)
Dept: FAMILY MEDICINE | Facility: CLINIC | Age: 37
End: 2022-10-28
Payer: MEDICAID

## 2022-10-28 VITALS
SYSTOLIC BLOOD PRESSURE: 114 MMHG | BODY MASS INDEX: 41.3 KG/M2 | RESPIRATION RATE: 20 BRPM | HEART RATE: 97 BPM | HEIGHT: 66 IN | WEIGHT: 257 LBS | DIASTOLIC BLOOD PRESSURE: 88 MMHG | OXYGEN SATURATION: 100 % | TEMPERATURE: 98 F

## 2022-10-28 DIAGNOSIS — R35.0 URINARY FREQUENCY: ICD-10-CM

## 2022-10-28 DIAGNOSIS — R07.9 CHEST PAIN, UNSPECIFIED TYPE: ICD-10-CM

## 2022-10-28 DIAGNOSIS — I10 HYPERTENSION, UNSPECIFIED TYPE: Primary | ICD-10-CM

## 2022-10-28 DIAGNOSIS — G43.109 MIGRAINE WITH AURA AND WITHOUT STATUS MIGRAINOSUS, NOT INTRACTABLE: ICD-10-CM

## 2022-10-28 LAB
APPEARANCE UR: ABNORMAL
BACTERIA #/AREA URNS AUTO: ABNORMAL /HPF
BILIRUB UR QL STRIP.AUTO: NEGATIVE MG/DL
COLOR UR AUTO: ABNORMAL
GLUCOSE UR QL STRIP.AUTO: NORMAL MG/DL
HYALINE CASTS #/AREA URNS LPF: ABNORMAL /LPF
KETONES UR QL STRIP.AUTO: NEGATIVE MG/DL
LEUKOCYTE ESTERASE UR QL STRIP.AUTO: NEGATIVE UNIT/L
MUCOUS THREADS URNS QL MICRO: ABNORMAL /LPF
NITRITE UR QL STRIP.AUTO: NEGATIVE
PH UR STRIP.AUTO: 6 [PH]
PROT UR QL STRIP.AUTO: NEGATIVE MG/DL
RBC #/AREA URNS AUTO: ABNORMAL /HPF
RBC UR QL AUTO: ABNORMAL UNIT/L
SP GR UR STRIP.AUTO: 1.02
SQUAMOUS #/AREA URNS LPF: ABNORMAL /HPF
UROBILINOGEN UR STRIP-ACNC: NORMAL MG/DL
WBC #/AREA URNS AUTO: ABNORMAL /HPF

## 2022-10-28 PROCEDURE — 1159F PR MEDICATION LIST DOCUMENTED IN MEDICAL RECORD: ICD-10-PCS | Mod: CPTII,,, | Performed by: NURSE PRACTITIONER

## 2022-10-28 PROCEDURE — 99214 PR OFFICE/OUTPT VISIT, EST, LEVL IV, 30-39 MIN: ICD-10-PCS | Mod: S$PBB,,, | Performed by: NURSE PRACTITIONER

## 2022-10-28 PROCEDURE — 3079F PR MOST RECENT DIASTOLIC BLOOD PRESSURE 80-89 MM HG: ICD-10-PCS | Mod: CPTII,,, | Performed by: NURSE PRACTITIONER

## 2022-10-28 PROCEDURE — 3074F SYST BP LT 130 MM HG: CPT | Mod: CPTII,,, | Performed by: NURSE PRACTITIONER

## 2022-10-28 PROCEDURE — 3079F DIAST BP 80-89 MM HG: CPT | Mod: CPTII,,, | Performed by: NURSE PRACTITIONER

## 2022-10-28 PROCEDURE — 99214 OFFICE O/P EST MOD 30 MIN: CPT | Mod: S$PBB,,, | Performed by: NURSE PRACTITIONER

## 2022-10-28 PROCEDURE — 81001 URINALYSIS AUTO W/SCOPE: CPT | Performed by: NURSE PRACTITIONER

## 2022-10-28 PROCEDURE — 1159F MED LIST DOCD IN RCRD: CPT | Mod: CPTII,,, | Performed by: NURSE PRACTITIONER

## 2022-10-28 PROCEDURE — 99214 OFFICE O/P EST MOD 30 MIN: CPT | Mod: PBBFAC,PN | Performed by: NURSE PRACTITIONER

## 2022-10-28 PROCEDURE — 1160F RVW MEDS BY RX/DR IN RCRD: CPT | Mod: CPTII,,, | Performed by: NURSE PRACTITIONER

## 2022-10-28 PROCEDURE — 3074F PR MOST RECENT SYSTOLIC BLOOD PRESSURE < 130 MM HG: ICD-10-PCS | Mod: CPTII,,, | Performed by: NURSE PRACTITIONER

## 2022-10-28 PROCEDURE — 1160F PR REVIEW ALL MEDS BY PRESCRIBER/CLIN PHARMACIST DOCUMENTED: ICD-10-PCS | Mod: CPTII,,, | Performed by: NURSE PRACTITIONER

## 2022-10-28 RX ORDER — AMLODIPINE BESYLATE 5 MG/1
5 TABLET ORAL DAILY
Qty: 30 TABLET | Refills: 11 | Status: SHIPPED | OUTPATIENT
Start: 2022-10-28 | End: 2022-10-28 | Stop reason: CLARIF

## 2022-10-28 RX ORDER — ALBUTEROL SULFATE 90 UG/1
AEROSOL, METERED RESPIRATORY (INHALATION)
COMMUNITY
Start: 2022-07-27 | End: 2023-08-09

## 2022-10-28 NOTE — TELEPHONE ENCOUNTER
Called and LVM for [facility]. Informed pt in message that we do not accept Medicaid, but [facility] does accept it. Provided their phone number ([phone]) to pt in message.

## 2022-10-28 NOTE — PROGRESS NOTES
"Patient Name: Ida Saxena   : 1985  MRN: [id number]     SUBJECTIVE:  Ida Saxena is a 37 y.o. female here for Follow-up (1 month f/u for B/P )      10/28/22: FU BP. C/o frequent urination. BP today is 114/88. C/o frequent urination.  Will check UA. Vitamin D level was 20 in September.  Due for recheck once high dose vitamin d is done (December). Has contacted [facility]-they do not accept Medicaid.   C/o migraine headaches to right side of temple and back of head that occur daily and associated with aura of lightheadedness and photosensitivity.  Denies vomiting.  Takes BC powder, excedrine migraine and advil without relief. Headaches are worsening in intensity and frequency in last 2 months.  Mother with hx aneurysm requiring craniotomy.  Has had eye exam and vision is without impairment, not needing correction.    22:  FU BP. I added HCTZ to regimen last visit and told to return in 2 weeks for BP check.     22:  Here to establish care.  Unsure if she is pregnant or not. C/o breast pain bilaterally, needs GYN referral.  Also c/o pain to both legs for a month with swelling.  Was on losartan for bp but stopped taking on her own.  She gets occasional "Chest pulling' and has family history of CAD.  Denies diaphoresis, does report heart racing and skipping beats occasionally.  C/o right wrist pain and numbness to right hand.  Bilateral heel pain and swelling to ankles.  C/o migraine headaches to right side of temple and back of head that occur daily and associated with aura of lightheadedness and photosensitivity.  Denies vomiting.  Takes BC powder, excedrine migraine and advil without relief.            ALLERGIES: Review of patient's allergies indicates:  No Known Allergies      ROS:  Review of Systems   Constitutional: Negative.    HENT: Negative.     Eyes: Negative.    Respiratory: Negative.     Cardiovascular: Negative.    Gastrointestinal: Negative.  " "  Genitourinary:  Positive for frequency.   Musculoskeletal: Negative.    Skin: Negative.    Neurological:  Positive for dizziness and headaches. Negative for seizures, loss of consciousness and weakness.   Endo/Heme/Allergies: Negative.    Psychiatric/Behavioral: Negative.     All other systems reviewed and are negative.      OBJECTIVE:  Vital signs  Vitals:    10/28/22 0713   BP: 114/88   BP Location: Right arm   Patient Position: Sitting   BP Method: Large (Automatic)   Pulse: 97   Resp: 20   Temp: 98.1 °F (36.7 °C)   TempSrc: Oral   SpO2: 100%   Weight: 116.6 kg (257 lb)   Height: 5' 6" (1.676 m)      Body mass index is 41.48 kg/m².    PHYSICAL EXAM:   Physical Exam  Vitals and nursing note reviewed.   HENT:      Head: Normocephalic and atraumatic.   Cardiovascular:      Rate and Rhythm: Normal rate and regular rhythm.      Pulses: Normal pulses.      Heart sounds: Normal heart sounds.   Pulmonary:      Breath sounds: Normal breath sounds.   Musculoskeletal:         General: Normal range of motion.      Cervical back: Normal range of motion.   Skin:     General: Skin is warm and dry.   Neurological:      General: No focal deficit present.      Mental Status: She is alert and oriented to person, place, and time.      GCS: GCS eye subscore is 4. GCS verbal subscore is 5. GCS motor subscore is 6.      Cranial Nerves: Cranial nerves 2-12 are intact.      Sensory: Sensation is intact.      Motor: Motor function is intact.      Coordination: Coordination is intact.      Gait: Gait is intact.      Deep Tendon Reflexes: Reflexes are normal and symmetric.   Psychiatric:         Mood and Affect: Mood normal.        ASSESSMENT/PLAN:  1. Hypertension, unspecified type  Overview:  BP Readings from Last 3 Encounters:   10/28/22 114/88   10/25/22 (!) 146/93   10/15/22 (!) 125/93         Assessment & Plan:  Continue HCTZ 25mg po daily.   Low Sodium Diet (Dash Diet - less than 2 grams of sodium per day).  Monitor Blood Pressure " daily and log. Report any consistent numbers greater than 140/90.  Smoking Cessation encouraged to aid in BP reduction.  Maintain healthy weight with goal BMI <30. Exercise 30 minutes per day 5 days per week        2. Urinary frequency  -     Urinalysis, Reflex to Urine Culture Urine, Clean Catch    3. Chest pain, unspecified type  Assessment & Plan:  Pending Cardiology appt next week.  Discussed not missing this appt as this is important to r/o cardiac disease. Er precautions for chest pain discussed.       4. Migraine with aura and without status migrainosus, not intractable  Assessment & Plan:  MRI ordered due to fly hx aneurysms and increase in HA frequency and intensity.   ER precautions discussed.      Orders:  -     MRI Brain W WO Contrast; Future; Expected date: 10/30/2022    Other orders  -     Discontinue: amLODIPine (NORVASC) 5 MG tablet; Take 1 tablet (5 mg total) by mouth once daily.  Dispense: 30 tablet; Refill: 11         RESULTS:  Recent Results (from the past 1008 hour(s))   Urinalysis, Reflex to Urine Culture Urine, Clean Catch    Collection Time: 10/28/22  7:42 AM    Specimen: Urine   Result Value Ref Range    Color, UA Light-Yellow Yellow, Light-Yellow, Dark Yellow, [place], Straw    Appearance, UA Turbid (A) Clear    Specific Gravity, UA 1.020     pH, UA 6.0 5.0 - 8.5    Protein, UA Negative Negative mg/dL    Glucose, UA Normal Negative, Normal mg/dL    Ketones, UA Negative Negative mg/dL    Blood, UA Trace (A) Negative unit/L    Bilirubin, UA Negative Negative mg/dL    Urobilinogen, UA Normal 0.2, 1.0, Normal mg/dL    Nitrites, UA Negative Negative    Leukocyte Esterase, UA Negative Negative unit/L    WBC, UA 0-5 None Seen, 0-2, 3-5, 0-5 /HPF    Bacteria, UA Trace (A) None Seen /HPF    Squamous Epithelial Cells, UA Moderate (A) None Seen /HPF    Mucous, UA Trace (A) None Seen /LPF    Hyaline Casts, UA None Seen None Seen /lpf    RBC, UA 0-5 None Seen, 0-2, 3-5, 0-5, >100 /HPF         Follow  Up:  Follow up in about 4 months (around 2/28/2023) for BP check, Follow-up.         This note was created with the assistance of a voice recognition software or phone dictation. There may be transcription errors as a result of using this technology however minimal. Effort has been made to assure accuracy of transcription but any obvious errors or omissions should be clarified with the author of the document

## 2022-10-28 NOTE — TELEPHONE ENCOUNTER
----- Message from India Jett sent at 10/28/2022  3:50 PM CDT -----  Regarding: Appt  Contact: pt [phone]  Pt is calling to check status of Bariatric appt, emailed medical records and watched video, please call

## 2022-10-28 NOTE — TELEPHONE ENCOUNTER
Provided patient with number to [facility].

## 2022-10-28 NOTE — ASSESSMENT & PLAN NOTE
Continue HCTZ 25mg po daily.   Low Sodium Diet (Dash Diet - less than 2 grams of sodium per day).  Monitor Blood Pressure daily and log. Report any consistent numbers greater than 140/90.  Smoking Cessation encouraged to aid in BP reduction.  Maintain healthy weight with goal BMI <30. Exercise 30 minutes per day 5 days per week

## 2022-10-28 NOTE — ASSESSMENT & PLAN NOTE
Pending Cardiology appt next week.  Discussed not missing this appt as this is important to r/o cardiac disease. Er precautions for chest pain discussed.

## 2022-10-30 ENCOUNTER — TELEPHONE (OUTPATIENT)
Dept: FAMILY MEDICINE | Facility: CLINIC | Age: 37
End: 2022-10-30
Payer: MEDICAID

## 2022-10-30 NOTE — ASSESSMENT & PLAN NOTE
MRI ordered due to fly hx aneurysms and increase in HA frequency and intensity.   ER precautions discussed.

## 2022-10-31 ENCOUNTER — TELEPHONE (OUTPATIENT)
Dept: FAMILY MEDICINE | Facility: CLINIC | Age: 37
End: 2022-10-31
Payer: MEDICAID

## 2022-11-08 ENCOUNTER — TELEPHONE (OUTPATIENT)
Dept: FAMILY MEDICINE | Facility: CLINIC | Age: 37
End: 2022-11-08
Payer: MEDICAID

## 2022-11-08 NOTE — TELEPHONE ENCOUNTER
Patient calling states that she needs documentation from you that she quit smoking since March so that she is able to have her weight loss surgery. Please advise.

## 2022-11-09 ENCOUNTER — TELEPHONE (OUTPATIENT)
Dept: FAMILY MEDICINE | Facility: CLINIC | Age: 37
End: 2022-11-09

## 2022-11-10 ENCOUNTER — HOSPITAL ENCOUNTER (EMERGENCY)
Facility: HOSPITAL | Age: 37
Discharge: HOME | End: 2022-11-10
Attending: FAMILY MEDICINE
Payer: MEDICAID

## 2022-11-10 VITALS
HEIGHT: 67 IN | DIASTOLIC BLOOD PRESSURE: 95 MMHG | TEMPERATURE: 98 F | RESPIRATION RATE: 18 BRPM | SYSTOLIC BLOOD PRESSURE: 137 MMHG | BODY MASS INDEX: 34.36 KG/M2 | WEIGHT: 218.94 LBS | HEART RATE: 60 BPM | OXYGEN SATURATION: 100 %

## 2022-11-10 DIAGNOSIS — R07.9 CHEST PAIN: ICD-10-CM

## 2022-11-10 DIAGNOSIS — R51.9 NONINTRACTABLE HEADACHE, UNSPECIFIED CHRONICITY PATTERN, UNSPECIFIED HEADACHE TYPE: Primary | ICD-10-CM

## 2022-11-10 LAB
ALBUMIN SERPL-MCNC: 3.8 GM/DL (ref 3.5–5)
ALBUMIN/GLOB SERPL: 1.2 RATIO (ref 1.1–2)
ALP SERPL-CCNC: 83 UNIT/L (ref 40–150)
ALT SERPL-CCNC: 16 UNIT/L (ref 0–55)
AST SERPL-CCNC: 20 UNIT/L (ref 5–34)
B-HCG UR QL: NEGATIVE
BASOPHILS # BLD AUTO: 0.04 X10(3)/MCL (ref 0–0.2)
BASOPHILS NFR BLD AUTO: 0.7 %
BILIRUBIN DIRECT+TOT PNL SERPL-MCNC: 0.2 MG/DL
BNP BLD-MCNC: <10 PG/ML
BUN SERPL-MCNC: 11.9 MG/DL (ref 7–18.7)
CALCIUM SERPL-MCNC: 9.4 MG/DL (ref 8.4–10.2)
CHLORIDE SERPL-SCNC: 105 MMOL/L (ref 98–107)
CO2 SERPL-SCNC: 25 MMOL/L (ref 22–29)
CREAT SERPL-MCNC: 0.75 MG/DL (ref 0.55–1.02)
CTP QC/QA: YES
EOSINOPHIL # BLD AUTO: 0.24 X10(3)/MCL (ref 0–0.9)
EOSINOPHIL NFR BLD AUTO: 4.2 %
ERYTHROCYTE [DISTWIDTH] IN BLOOD BY AUTOMATED COUNT: 14.1 % (ref 11.5–17)
GFR SERPLBLD CREATININE-BSD FMLA CKD-EPI: >60 MLS/MIN/1.73/M2
GLOBULIN SER-MCNC: 3.2 GM/DL (ref 2.4–3.5)
GLUCOSE SERPL-MCNC: 97 MG/DL (ref 74–100)
HCT VFR BLD AUTO: 39.6 % (ref 37–47)
HGB BLD-MCNC: 11.9 GM/DL (ref 12–16)
IMM GRANULOCYTES # BLD AUTO: 0.03 X10(3)/MCL (ref 0–0.04)
IMM GRANULOCYTES NFR BLD AUTO: 0.5 %
LYMPHOCYTES # BLD AUTO: 2.2 X10(3)/MCL (ref 0.6–4.6)
LYMPHOCYTES NFR BLD AUTO: 38.7 %
MAGNESIUM SERPL-MCNC: 2 MG/DL (ref 1.6–2.6)
MCH RBC QN AUTO: 25.1 PG (ref 27–31)
MCHC RBC AUTO-ENTMCNC: 30.1 MG/DL (ref 33–36)
MCV RBC AUTO: 83.5 FL (ref 80–94)
MONOCYTES # BLD AUTO: 0.5 X10(3)/MCL (ref 0.1–1.3)
MONOCYTES NFR BLD AUTO: 8.8 %
NEUTROPHILS # BLD AUTO: 2.7 X10(3)/MCL (ref 2.1–9.2)
NEUTROPHILS NFR BLD AUTO: 47.1 %
NRBC BLD AUTO-RTO: 0 %
PLATELET # BLD AUTO: 287 X10(3)/MCL (ref 130–400)
PMV BLD AUTO: 9.4 FL (ref 7.4–10.4)
POTASSIUM SERPL-SCNC: 4.1 MMOL/L (ref 3.5–5.1)
PROT SERPL-MCNC: 7 GM/DL (ref 6.4–8.3)
RBC # BLD AUTO: 4.74 X10(6)/MCL (ref 4.2–5.4)
SODIUM SERPL-SCNC: 140 MMOL/L (ref 136–145)
TROPONIN I SERPL-MCNC: <0.01 NG/ML (ref 0–0.04)
WBC # SPEC AUTO: 5.7 X10(3)/MCL (ref 4.5–11.5)

## 2022-11-10 PROCEDURE — 63600175 PHARM REV CODE 636 W HCPCS: Performed by: FAMILY MEDICINE

## 2022-11-10 PROCEDURE — 96374 THER/PROPH/DIAG INJ IV PUSH: CPT

## 2022-11-10 PROCEDURE — 81025 URINE PREGNANCY TEST: CPT | Performed by: FAMILY MEDICINE

## 2022-11-10 PROCEDURE — 93005 ELECTROCARDIOGRAM TRACING: CPT

## 2022-11-10 PROCEDURE — 96361 HYDRATE IV INFUSION ADD-ON: CPT

## 2022-11-10 PROCEDURE — 80053 COMPREHEN METABOLIC PANEL: CPT | Performed by: FAMILY MEDICINE

## 2022-11-10 PROCEDURE — 85025 COMPLETE CBC W/AUTO DIFF WBC: CPT | Performed by: FAMILY MEDICINE

## 2022-11-10 PROCEDURE — 84484 ASSAY OF TROPONIN QUANT: CPT | Performed by: FAMILY MEDICINE

## 2022-11-10 PROCEDURE — 99285 EMERGENCY DEPT VISIT HI MDM: CPT | Mod: 25

## 2022-11-10 PROCEDURE — 83735 ASSAY OF MAGNESIUM: CPT | Performed by: FAMILY MEDICINE

## 2022-11-10 PROCEDURE — 83880 ASSAY OF NATRIURETIC PEPTIDE: CPT | Performed by: FAMILY MEDICINE

## 2022-11-10 PROCEDURE — 25000003 PHARM REV CODE 250: Performed by: FAMILY MEDICINE

## 2022-11-10 RX ORDER — DIPHENHYDRAMINE HCL 25 MG
25 CAPSULE ORAL
Status: COMPLETED | OUTPATIENT
Start: 2022-11-10 | End: 2022-11-10

## 2022-11-10 RX ORDER — SUMATRIPTAN 50 MG/1
50 TABLET, FILM COATED ORAL
Qty: 28 TABLET | Refills: 0 | Status: SHIPPED | OUTPATIENT
Start: 2022-11-10 | End: 2023-01-30 | Stop reason: SDUPTHER

## 2022-11-10 RX ORDER — METOCLOPRAMIDE HYDROCHLORIDE 5 MG/ML
10 INJECTION INTRAMUSCULAR; INTRAVENOUS
Status: COMPLETED | OUTPATIENT
Start: 2022-11-10 | End: 2022-11-10

## 2022-11-10 RX ADMIN — SODIUM CHLORIDE 1000 ML: 9 INJECTION, SOLUTION INTRAVENOUS at 03:11

## 2022-11-10 RX ADMIN — DIPHENHYDRAMINE HYDROCHLORIDE 25 MG: 25 CAPSULE ORAL at 03:11

## 2022-11-10 RX ADMIN — METOCLOPRAMIDE 10 MG: 5 INJECTION, SOLUTION INTRAMUSCULAR; INTRAVENOUS at 03:11

## 2022-11-10 NOTE — Clinical Note
"Ida Guerrero" Hemanth was seen and treated in our emergency department on 11/10/2022.  She may return to work on 11/12/2022.       If you have any questions or concerns, please don't hesitate to call.       RN    "

## 2022-11-10 NOTE — ED PROVIDER NOTES
Name: Ida Saxena   Age: 37 y.o.  Sex: female    Chief complaint:   Chief Complaint   Patient presents with    Headache     Pt reports intermittent migraines x 1 month. States unresolved w/ over the counter meds.       Patient arrived with: Private  History obtained from: Patient    Subjective:   37-year-old female with a history of hypertension that presents to the emergency department for headache and chest pain.  Patient both the symptoms have been going on for months.  She doctor primary care doctor and is scheduled for an echocardiogram and MRI in the future.  Headache is a dull sensation that is unchanged compared to the previous months.  Headache is worse with any light.  Complains of intermittent blurry vision, no double vision.  Denies ringing in the ears, numbness/tingling/weakness in arms or legs.  Denies any falls or trauma.  Also complains of mild left-sided chest pain that is sharp, waxes and wanes, nonradiating.  Intermittent shortness of breath.  No syncopal episode.  Denies fever, chills, sweats, cough, sore throat, runny nose, abdominal pain, vomiting, diarrhea, dysuria, hematuria.    Past Medical History:   Diagnosis Date    Anemia 09/13/2022    Hypertension      No past surgical history on file.  Social History     Socioeconomic History    Marital status: Single   Tobacco Use    Smoking status: Former     Types: Cigarettes    Smokeless tobacco: Never   Substance and Sexual Activity    Alcohol use: Yes    Drug use: Never    Sexual activity: Yes     Review of patient's allergies indicates:  No Known Allergies     Review of Systems   Constitutional:  Negative for diaphoresis and fever.   HENT:  Negative for congestion and sore throat.    Eyes:  Negative for pain and discharge.   Respiratory:  Positive for shortness of breath. Negative for cough.    Cardiovascular:  Positive for chest pain. Negative for palpitations.   Gastrointestinal:  Negative for diarrhea and vomiting.   Genitourinary:   Negative for dysuria and hematuria.   Musculoskeletal:  Negative for back pain and myalgias.   Skin:  Negative for itching and rash.   Neurological:  Positive for headaches. Negative for weakness.        Objective:     Vitals:    11/10/22 0533   BP: (!) 137/95   Pulse: 60   Resp: 18   Temp:         Physical Exam  Constitutional:       Appearance: She is not toxic-appearing.   HENT:      Head: Normocephalic and atraumatic.   Cardiovascular:      Rate and Rhythm: Regular rhythm.      Pulses: Normal pulses.   Pulmonary:      Effort: Pulmonary effort is normal.      Breath sounds: Normal breath sounds.   Abdominal:      General: Abdomen is flat. Bowel sounds are normal.      Palpations: Abdomen is soft.   Musculoskeletal:         General: No deformity. Normal range of motion.      Cervical back: Normal range of motion and neck supple.   Skin:     General: Skin is warm and dry.   Neurological:      General: No focal deficit present.      Mental Status: She is alert and oriented to person, place, and time. Mental status is at baseline.      Cranial Nerves: No cranial nerve deficit.      Motor: No weakness.   Psychiatric:         Mood and Affect: Mood normal.         Behavior: Behavior normal.        Records:  Nursing records and triage records reviewed  Prior records reviewed    Medical decision making:   Presents to the emergency department for chronic headache and chest pain.  Patient is nontoxic appearing.  Vital signs are within normal limits.  Physical exam within normal limits with no signs of obvious neurological deficits.  Will get cardiac workup for further evaluation.  Patient will get a migraine cocktail for symptomatic management.    ED Course as of 11/10/22 0552   u Nov 10, 2022   0438 EKG is nonischemic with prolonged QTC of 501 [RK]   0518 CXR normal [RK]   0526 Troponin negative.  No leukocytosis, anemia, thrombocytopenia.  No severe electrolyte abnormality.  No BRIAN hyperglycemia.  Liver enzymes are  within normal limits. [RK]   0552 On re-evaluation patient she was starting to feel better.  She will be discharged with a prescription for sumatriptan.  We discussed return precautions and I listed them of the discharge instructions.  Patient understands the plan, no further questions, felt safe for discharge. [RK]      ED Course User Index  [RK] Jesse Deleon MD          EKG:  ECG Results              EKG 12-lead (Preliminary result)  Result time 11/10/22 04:38:46      ED Interpretation by Jesse Deleon MD (11/10/22 04:38:46, [facility], Emergency Medicine)    Normal sinus rhythm at a rate of 74, no signs of ST elevation or depression, normal axis, normal intervals with a prolonged QTC of 501                                     Procedures       Diagnosis:  Final diagnoses:  [R07.9] Chest pain  [R51.9] Nonintractable headache, unspecified chronicity pattern, unspecified headache type (Primary)     ED Prescriptions       Medication Sig Dispense Start Date End Date Auth. Provider    sumatriptan (IMITREX) 50 MG tablet Take 1 tablet (50 mg total) by mouth every 6 to 8 hours as needed for Migraine. 28 tablet 11/10/2022 -- Jesse Deleon MD          Follow-up Information       Follow up With Specialties Details Why Contact Info    Dentist  Call  As soon as possible             Jesse Deleon M.D.  Emergency Medicine Physician     (Please note that this chart was completed via voice to text dictation. There may be typographical errors or substitutions that are unintentional, or uncorrected. Every attempt was made to proofread the chart prior to completion. If there are any questions, please contact the physician for final clarification).         Jesse Deleon MD  11/10/22 0552

## 2022-11-10 NOTE — DISCHARGE INSTRUCTIONS
You came into the emergency department with chest pain and a migraine.    Your labs, EKGs, chest x-ray were all normal.    You will be discharged with medication which help you with your migraines.      You should follow-up with a primary care doctor for reevaluation.    They may recommend further cardiac testing such as an ultrasound of your heart or stress test.    Return to the emergency department if you have worsening chest pain, shortness of breath, feel lightheaded or dizzy to the point where you may pass out, headaches that are not normal, double vision, constant ringing in the ears, numbness/tingling/weakness in arms or legs.

## 2022-11-15 ENCOUNTER — TELEPHONE (OUTPATIENT)
Dept: FAMILY MEDICINE | Facility: CLINIC | Age: 37
End: 2022-11-15

## 2022-11-22 ENCOUNTER — HOSPITAL ENCOUNTER (OUTPATIENT)
Dept: RADIOLOGY | Facility: HOSPITAL | Age: 37
Discharge: HOME | End: 2022-11-22
Attending: NURSE PRACTITIONER
Payer: MEDICAID

## 2022-11-22 DIAGNOSIS — G43.109 MIGRAINE WITH AURA AND WITHOUT STATUS MIGRAINOSUS, NOT INTRACTABLE: ICD-10-CM

## 2022-11-22 PROCEDURE — 25500020 PHARM REV CODE 255

## 2022-11-22 PROCEDURE — 70553 MRI BRAIN STEM W/O & W/DYE: CPT | Mod: TC

## 2022-11-22 PROCEDURE — A9577 INJ MULTIHANCE: HCPCS

## 2022-11-22 RX ADMIN — GADOBENATE DIMEGLUMINE 20 ML: 529 INJECTION, SOLUTION INTRAVENOUS at 08:11

## 2022-11-30 ENCOUNTER — TELEPHONE (OUTPATIENT)
Dept: FAMILY MEDICINE | Facility: CLINIC | Age: 37
End: 2022-11-30
Payer: MEDICAID

## 2023-01-13 ENCOUNTER — TELEPHONE (OUTPATIENT)
Dept: FAMILY MEDICINE | Facility: CLINIC | Age: 38
End: 2023-01-13

## 2023-01-19 ENCOUNTER — TELEPHONE (OUTPATIENT)
Dept: FAMILY MEDICINE | Facility: CLINIC | Age: 38
End: 2023-01-19
Payer: MEDICAID

## 2023-01-20 DIAGNOSIS — I89.1 LYMPHANGITIS OF GROIN: Primary | ICD-10-CM

## 2023-01-20 NOTE — PROGRESS NOTES
Please notify pt that we will order US of both groin areas to better visualize.  We may need to order a CT pelvis with contrast if this does not show us what we need to see.  I called [facility] and spoke to radiologist on phone to specifically find out what would be needed to classify what this is and what needs to be ordered to work it up. Scheduling should be in touch to schedule this US.     I have sent medications and/or lab orders in for this patient.  Please notify the patient.     Orders Placed This Encounter   Procedures    US Pelvis Limited Non OB     Lower ext venous US showed right groin lymph node and left groin nodules, need better visualization/classification of this area.     Standing Status:   Future     Standing Expiration Date:   2/20/2023     Order Specific Question:   [facility] ONLY: Performing/Resulting Location     Answer:   [facility]     Order Specific Question:   Reason for Exam:     Answer:   left groin nodules on venous ultrasound, need better classification,  soft tissue US of left and right groin needed     Order Specific Question:   May the Radiologist modify the order per protocol to meet the clinical needs of the patient?     Answer:   Yes     Order Specific Question:   Release to patient     Answer:   Immediate

## 2023-01-23 ENCOUNTER — TELEPHONE (OUTPATIENT)
Dept: FAMILY MEDICINE | Facility: CLINIC | Age: 38
End: 2023-01-23

## 2023-01-26 ENCOUNTER — HOSPITAL ENCOUNTER (OUTPATIENT)
Dept: RADIOLOGY | Facility: HOSPITAL | Age: 38
Discharge: HOME | End: 2023-01-26
Attending: NURSE PRACTITIONER
Payer: MEDICAID

## 2023-01-26 DIAGNOSIS — I89.1 LYMPHANGITIS OF GROIN: ICD-10-CM

## 2023-01-26 PROCEDURE — 76857 US EXAM PELVIC LIMITED: CPT | Mod: TC

## 2023-01-27 NOTE — PROGRESS NOTES
1/27/23:  US performed 1/26/23 after a venous US ordered to check for DVT/flow showed right groin lymph node 2.14cm x 1.03cm and left groin nodules noted as cystic in appearance, 1.06cm x 0.70cm x 0.87cm, x 0.73cm.      Results of soft tissue groin US for better specificity showed  the following:  CLINICAL HISTORY:  left groin nodules on venous ultrasound, need better classification,  soft tissue US of left and right groin needed; Lymphangitis     TECHNIQUE:  Limited ultrasound of the pelvis with attention to the groin.     COMPARISON:  CT abdomen pelvis 01/14/2020     FINDINGS:  Morphologically normal bilateral inguinal chain lymph nodes are seen.  Largest lymph node on the right measures 2.2 x 1.4 x 0.6 cm.  Largest lymph node on the left measures 1.8 x 1.1 x 0.8 cm.     No fluid collection or mass.     Impression:     Morphologically normal bilateral inguinal chain lymph nodes    Interp note: I have discussed with Dr. Fabian and it is felt at this time that the repeat study is normal and does not require further workup unless pt develops symptoms moving forward at which time we will order CT abdomen/pelvis with contrast.  Pt was noted on lower ext US to have moderate bilateral CFV and mild right proximal SFV chronic venous insufficiency.  Study was negative for DVT in legs.  CVI can cause symptoms of peripheral edema as the lymph is not draining appropriately.  Pt is followed by Cardiology[facility]Vascular currently.  Will defer recommendations to that specialty.      Patient has appointment with me on Monday 1/30/23 for routine FU. Will discuss results with her at that time.

## 2023-01-30 ENCOUNTER — OFFICE VISIT (OUTPATIENT)
Dept: FAMILY MEDICINE | Facility: CLINIC | Age: 38
End: 2023-01-30
Payer: MEDICAID

## 2023-01-30 VITALS
DIASTOLIC BLOOD PRESSURE: 89 MMHG | HEIGHT: 67 IN | HEART RATE: 100 BPM | OXYGEN SATURATION: 100 % | BODY MASS INDEX: 39.49 KG/M2 | TEMPERATURE: 98 F | WEIGHT: 251.63 LBS | RESPIRATION RATE: 20 BRPM | SYSTOLIC BLOOD PRESSURE: 128 MMHG

## 2023-01-30 DIAGNOSIS — E66.9 OBESITY, UNSPECIFIED CLASSIFICATION, UNSPECIFIED OBESITY TYPE, UNSPECIFIED WHETHER SERIOUS COMORBIDITY PRESENT: ICD-10-CM

## 2023-01-30 DIAGNOSIS — I10 HYPERTENSION, UNSPECIFIED TYPE: Primary | ICD-10-CM

## 2023-01-30 DIAGNOSIS — G43.109 MIGRAINE WITH AURA AND WITHOUT STATUS MIGRAINOSUS, NOT INTRACTABLE: ICD-10-CM

## 2023-01-30 PROCEDURE — 3074F SYST BP LT 130 MM HG: CPT | Mod: CPTII,,, | Performed by: NURSE PRACTITIONER

## 2023-01-30 PROCEDURE — 99214 OFFICE O/P EST MOD 30 MIN: CPT | Mod: S$PBB,,, | Performed by: NURSE PRACTITIONER

## 2023-01-30 PROCEDURE — 1159F PR MEDICATION LIST DOCUMENTED IN MEDICAL RECORD: ICD-10-PCS | Mod: CPTII,,, | Performed by: NURSE PRACTITIONER

## 2023-01-30 PROCEDURE — 3074F PR MOST RECENT SYSTOLIC BLOOD PRESSURE < 130 MM HG: ICD-10-PCS | Mod: CPTII,,, | Performed by: NURSE PRACTITIONER

## 2023-01-30 PROCEDURE — 99214 PR OFFICE/OUTPT VISIT, EST, LEVL IV, 30-39 MIN: ICD-10-PCS | Mod: S$PBB,,, | Performed by: NURSE PRACTITIONER

## 2023-01-30 PROCEDURE — 3079F DIAST BP 80-89 MM HG: CPT | Mod: CPTII,,, | Performed by: NURSE PRACTITIONER

## 2023-01-30 PROCEDURE — 99215 OFFICE O/P EST HI 40 MIN: CPT | Mod: PBBFAC,PN | Performed by: NURSE PRACTITIONER

## 2023-01-30 PROCEDURE — 3008F BODY MASS INDEX DOCD: CPT | Mod: CPTII,,, | Performed by: NURSE PRACTITIONER

## 2023-01-30 PROCEDURE — 1160F RVW MEDS BY RX/DR IN RCRD: CPT | Mod: CPTII,,, | Performed by: NURSE PRACTITIONER

## 2023-01-30 PROCEDURE — 1160F PR REVIEW ALL MEDS BY PRESCRIBER/CLIN PHARMACIST DOCUMENTED: ICD-10-PCS | Mod: CPTII,,, | Performed by: NURSE PRACTITIONER

## 2023-01-30 PROCEDURE — 1159F MED LIST DOCD IN RCRD: CPT | Mod: CPTII,,, | Performed by: NURSE PRACTITIONER

## 2023-01-30 PROCEDURE — 3008F PR BODY MASS INDEX (BMI) DOCUMENTED: ICD-10-PCS | Mod: CPTII,,, | Performed by: NURSE PRACTITIONER

## 2023-01-30 PROCEDURE — 3079F PR MOST RECENT DIASTOLIC BLOOD PRESSURE 80-89 MM HG: ICD-10-PCS | Mod: CPTII,,, | Performed by: NURSE PRACTITIONER

## 2023-01-30 RX ORDER — SUMATRIPTAN 50 MG/1
50 TABLET, FILM COATED ORAL
Qty: 28 TABLET | Refills: 3 | Status: SHIPPED | OUTPATIENT
Start: 2023-01-30 | End: 2023-08-09 | Stop reason: SDUPTHER

## 2023-01-30 NOTE — PROGRESS NOTES
Patient Name: Ida Saxena   : 1985  MRN: [id number]     SUBJECTIVE DATA:    CHIEF COMPLAINT:   Ida Saxena is a 37 y.o. female who presents to clinic today with Follow-up (4 month f/u, wants US results, refills)        HPI:  23:  Follow-up HTN, migraines, chest pain.  Referred to Cardiology for chest pain issues and pain to both legs with swelling, had appointment last time she was seen.   Needs referral to Neurology for preventive migraine treatment. Imitrex helping but insurance only covers 8 pills and she has used 6 since it was prescribed in November.  MRI was normal.  She does tell me that at the time the US was done on her legs, she had a lesion near her genitals but not directly on it that sounds more like folliculitis to me but the picture represents an open blistered area with drainage that she states drained for 3 days.  This could likely be the cause for her increased lymph node swelling to groin area at that time.   We will continue to monitor for increased swollen lymph nodes in groins, axillary, neck regions.  Denies any unintentional weight loss, fever, fatigue.  Is requesting referral to Bariatrics at [facility], states she called and was told they take her insurance.  No other issues reported today.  Has lost about 6 pound since her last visit in October.  Walking increases the pain in her legs.  Was told to wear compression stockings by cardiology for venous insufficiency.    10/28/22: FU BP. C/o frequent urination. BP today is 114/88. C/o frequent urination.  Will check UA. Vitamin D level was 20 in September.  Due for recheck once high dose vitamin d is done (December). Has contacted [facility]-they do not accept Medicaid.   C/o migraine headaches to right side of temple and back of head that occur daily and associated with aura of lightheadedness and photosensitivity.  Denies vomiting.  Takes BC powder, excedrine migraine and advil without  "relief. Headaches are worsening in intensity and frequency in last 2 months.  Mother with hx aneurysm requiring craniotomy.  Has had eye exam and vision is without impairment, not needing correction.    9/29/22:  FU BP. I added HCTZ to regimen last visit and told to return in 2 weeks for BP check.     9/13/22:  Here to establish care.  Unsure if she is pregnant or not. C/o breast pain bilaterally, needs GYN referral.  Also c/o pain to both legs for a month with swelling.  Was on losartan for bp but stopped taking on her own.  She gets occasional "Chest pulling' and has family history of CAD.  Denies diaphoresis, does report heart racing and skipping beats occasionally.  C/o right wrist pain and numbness to right hand.  Bilateral heel pain and swelling to ankles.  C/o migraine headaches to right side of temple and back of head that occur daily and associated with aura of lightheadedness and photosensitivity.  Denies vomiting.  Takes BC powder, excedrine migraine and advil without relief.                ALLERGIES: Review of patient's allergies indicates:  No Known Allergies      ROS:  Review of Systems   Constitutional:  Negative for chills, diaphoresis, fever, malaise/fatigue and weight loss.   Cardiovascular:  Positive for chest pain, palpitations and claudication.   Psychiatric/Behavioral:  The patient is nervous/anxious.    All other systems reviewed and are negative.      OBJECTIVE DATA:  Vital signs  Vitals:    01/30/23 1209   BP: 128/89   BP Location: Right arm   Patient Position: Sitting   BP Method: Large (Automatic)   Pulse: 100   Resp: 20   Temp: 98.3 °F (36.8 °C)   TempSrc: Oral   SpO2: 100%   Weight: 114.1 kg (251 lb 9.6 oz)   Height: 5' 7" (1.702 m)      Body mass index is 39.41 kg/m².    PHYSICAL EXAM:   Physical Exam  Vitals and nursing note reviewed.   HENT:      Head: Normocephalic and atraumatic.   Cardiovascular:      Rate and Rhythm: Normal rate and regular rhythm.      Pulses: Normal pulses.      " Heart sounds: Normal heart sounds.   Pulmonary:      Breath sounds: Normal breath sounds.   Musculoskeletal:         General: Normal range of motion.      Cervical back: Normal range of motion.   Skin:     General: Skin is warm and dry.   Neurological:      General: No focal deficit present.      Mental Status: She is alert and oriented to person, place, and time.   Psychiatric:         Mood and Affect: Mood normal.        ASSESSMENT/PLAN:  1. Hypertension, unspecified type  Overview:  BP Readings from Last 3 Encounters:   10/28/22 114/88   10/25/22 (!) 146/93   10/15/22 (!) 125/93           2. Obesity, unspecified classification, unspecified obesity type, unspecified whether serious comorbidity present  Overview:  Wt Readings from Last 3 Encounters:   01/30/23 1209 114.1 kg (251 lb 9.6 oz)   11/10/22 0337 99.3 kg (218 lb 14.7 oz)   10/28/22 0713 116.6 kg (257 lb)         Orders:  -     Ambulatory referral/consult to Bariatric Surgery; Future; Expected date: 01/30/2023    3. Migraine with aura and without status migrainosus, not intractable  -     sumatriptan (IMITREX) 50 MG tablet; Take 1 tablet (50 mg total) by mouth every 6 to 8 hours as needed for Migraine.  Dispense: 28 tablet; Refill: 3  -     Ambulatory referral/consult to Neurology; Future; Expected date: 01/30/2023           RESULTS:  No results found for this or any previous visit (from the past 1008 hour(s)).      Follow Up:  Follow up in about 4 months (around 5/30/2023) for HTN, leg swelling, obesity, migraines.               This note was created with the assistance of a voice recognition software or phone dictation. There may be transcription errors as a result of using this technology however minimal. Effort has been made to assure accuracy of transcription but any obvious errors or omissions should be clarified with the author of the document

## 2023-02-09 ENCOUNTER — TELEPHONE (OUTPATIENT)
Dept: FAMILY MEDICINE | Facility: CLINIC | Age: 38
End: 2023-02-09
Payer: MEDICAID

## 2023-03-16 ENCOUNTER — OFFICE VISIT (OUTPATIENT)
Dept: FAMILY MEDICINE | Facility: CLINIC | Age: 38
End: 2023-03-16
Payer: MEDICAID

## 2023-03-16 VITALS
OXYGEN SATURATION: 100 % | RESPIRATION RATE: 20 BRPM | WEIGHT: 252 LBS | BODY MASS INDEX: 39.55 KG/M2 | DIASTOLIC BLOOD PRESSURE: 89 MMHG | TEMPERATURE: 98 F | HEART RATE: 83 BPM | HEIGHT: 67 IN | SYSTOLIC BLOOD PRESSURE: 128 MMHG

## 2023-03-16 DIAGNOSIS — N92.6 MISSED PERIOD: Primary | ICD-10-CM

## 2023-03-16 DIAGNOSIS — E55.9 VITAMIN D DEFICIENCY: ICD-10-CM

## 2023-03-16 DIAGNOSIS — R79.89 ELEVATED PROLACTIN LEVEL: Primary | ICD-10-CM

## 2023-03-16 DIAGNOSIS — E55.9 VITAMIN D DEFICIENCY: Primary | ICD-10-CM

## 2023-03-16 LAB
B-HCG SERPL QL: NEGATIVE
B-HCG UR QL: NEGATIVE
CTP QC/QA: YES
DEPRECATED CALCIDIOL+CALCIFEROL SERPL-MC: 20.1 NG/ML (ref 30–80)
FSH SERPL-ACNC: 7.27 MIU/ML
LH SERPL-ACNC: 5.98 MIU/ML
PROLACTIN LEVEL (OHS): 30.56 NG/ML (ref 5.18–26.53)

## 2023-03-16 PROCEDURE — 82306 VITAMIN D 25 HYDROXY: CPT | Performed by: NURSE PRACTITIONER

## 2023-03-16 PROCEDURE — 83001 ASSAY OF GONADOTROPIN (FSH): CPT | Performed by: NURSE PRACTITIONER

## 2023-03-16 PROCEDURE — 3074F PR MOST RECENT SYSTOLIC BLOOD PRESSURE < 130 MM HG: ICD-10-PCS | Mod: CPTII,,, | Performed by: NURSE PRACTITIONER

## 2023-03-16 PROCEDURE — 3008F BODY MASS INDEX DOCD: CPT | Mod: CPTII,,, | Performed by: NURSE PRACTITIONER

## 2023-03-16 PROCEDURE — 1160F RVW MEDS BY RX/DR IN RCRD: CPT | Mod: CPTII,,, | Performed by: NURSE PRACTITIONER

## 2023-03-16 PROCEDURE — 36415 COLL VENOUS BLD VENIPUNCTURE: CPT | Performed by: NURSE PRACTITIONER

## 2023-03-16 PROCEDURE — 83002 ASSAY OF GONADOTROPIN (LH): CPT | Performed by: NURSE PRACTITIONER

## 2023-03-16 PROCEDURE — 84146 ASSAY OF PROLACTIN: CPT | Performed by: NURSE PRACTITIONER

## 2023-03-16 PROCEDURE — 3008F PR BODY MASS INDEX (BMI) DOCUMENTED: ICD-10-PCS | Mod: CPTII,,, | Performed by: NURSE PRACTITIONER

## 2023-03-16 PROCEDURE — 99214 OFFICE O/P EST MOD 30 MIN: CPT | Mod: S$PBB,,, | Performed by: NURSE PRACTITIONER

## 2023-03-16 PROCEDURE — 3074F SYST BP LT 130 MM HG: CPT | Mod: CPTII,,, | Performed by: NURSE PRACTITIONER

## 2023-03-16 PROCEDURE — 99214 PR OFFICE/OUTPT VISIT, EST, LEVL IV, 30-39 MIN: ICD-10-PCS | Mod: S$PBB,,, | Performed by: NURSE PRACTITIONER

## 2023-03-16 PROCEDURE — 99213 OFFICE O/P EST LOW 20 MIN: CPT | Mod: PBBFAC,PN | Performed by: NURSE PRACTITIONER

## 2023-03-16 PROCEDURE — 1159F PR MEDICATION LIST DOCUMENTED IN MEDICAL RECORD: ICD-10-PCS | Mod: CPTII,,, | Performed by: NURSE PRACTITIONER

## 2023-03-16 PROCEDURE — 1160F PR REVIEW ALL MEDS BY PRESCRIBER/CLIN PHARMACIST DOCUMENTED: ICD-10-PCS | Mod: CPTII,,, | Performed by: NURSE PRACTITIONER

## 2023-03-16 PROCEDURE — 3079F PR MOST RECENT DIASTOLIC BLOOD PRESSURE 80-89 MM HG: ICD-10-PCS | Mod: CPTII,,, | Performed by: NURSE PRACTITIONER

## 2023-03-16 PROCEDURE — 1159F MED LIST DOCD IN RCRD: CPT | Mod: CPTII,,, | Performed by: NURSE PRACTITIONER

## 2023-03-16 PROCEDURE — 84703 CHORIONIC GONADOTROPIN ASSAY: CPT | Performed by: NURSE PRACTITIONER

## 2023-03-16 PROCEDURE — 3079F DIAST BP 80-89 MM HG: CPT | Mod: CPTII,,, | Performed by: NURSE PRACTITIONER

## 2023-03-16 PROCEDURE — 81025 URINE PREGNANCY TEST: CPT | Mod: PBBFAC,PN | Performed by: NURSE PRACTITIONER

## 2023-03-16 RX ORDER — ASPIRIN 325 MG
50000 TABLET, DELAYED RELEASE (ENTERIC COATED) ORAL
Qty: 12 CAPSULE | Refills: 0 | Status: SHIPPED | OUTPATIENT
Start: 2023-03-16 | End: 2023-08-09

## 2023-03-16 NOTE — PROGRESS NOTES
I have sent medications and/or lab orders in for this patient.  Please notify the patient.     One of her hormone levels is elevated and could be causing her menstrual issues.  I need to order an MRI of her pituitary gland to ensure this is not the cause for the problem.  Pregnancy testing today in clinic was negative.      Orders Placed This Encounter   Procedures    MRI Pituitary W W/O Contrast     Standing Status:   Future     Standing Expiration Date:   3/16/2024     Order Specific Question:   Does the patient have a pacemaker or a defibrillator (Note: Some facilities may not be able to schedule an MRI for patients with pacemakers and defibrillators. You should contact your local radiology department to determine if this is the case.)?     Answer:   No     Order Specific Question:   Does the patient have an aneurysm or surgical clip, pump, nerve/brain stimulator, middle/inner ear prosthesis, or other metal implant or foreign object (bullet, shrapnel)? If they have a card related to their implant, ask them to bring it. Issues related to the implant may cause the MRI to be delayed.     Answer:   No     Order Specific Question:   Is the patient claustrophobic?     Answer:   No     Order Specific Question:   Will the patient require sedation?     Answer:   No     Order Specific Question:   Does the patient have any of the following conditions? Diabetes, History of Renal Disease or Hypertension requiring medical therapy?     Answer:   Yes     Order Specific Question:   May the Radiologist modify the order per protocol to meet the clinical needs of the patient?     Answer:   Yes     Order Specific Question:   Is this part of a Research Study?     Answer:   No     Order Specific Question:   Does the patient have on a skin patch for medication with aluminized backing?     Answer:   No     Order Specific Question:   [facility] ONLY: Performing/Resulting Location     Answer:   [facility]

## 2023-03-16 NOTE — PROGRESS NOTES
Educate on increasing foods high in Vitamin D such as fish oil, cod liver oil, salmon, milk fortified with vitamin D.  RX Vitamin D3 07193 IU weekly x 12 weeks.  Complete entire 12 weeks of Vitamin D prescription.  After completion of prescription (12 weeks/3 months), begin taking Vitamin D 2000 I.U. tablets daily (purchase over the counter).  Repeat Vitamin D level as ordered.

## 2023-03-16 NOTE — PROGRESS NOTES
Patient Name: Ida Saxena   : 1985  MRN: [id number]     SUBJECTIVE DATA:    CHIEF COMPLAINT:   Ida Saxena is a 38 y.o. female who presents to clinic today with Menstrual Problem (Missed period for the month Feb and now having some spotting (negative preg test a week ago))        HPI:  3/16/23: Pt missed period and had negative pregnancy test 1 week ago, now having spotting.  Period , no period in February and is now spotting in March.  No appt [facility] GYN yet, referred in September.  Due for PAP.  Denies increased stress levels, no changes to diet, no hair to chin/face.  US pelvis in January did not show any significant issues inside her pelvis.      23:  Follow-up HTN, migraines, chest pain.  Referred to Cardiology for chest pain issues and pain to both legs with swelling, had appointment last time she was seen.   Needs referral to Neurology for preventive migraine treatment. Imitrex helping but insurance only covers 8 pills and she has used 6 since it was prescribed in November.  MRI was normal.  She does tell me that at the time the US was done on her legs, she had a lesion near her genitals but not directly on it that sounds more like folliculitis to me but the picture represents an open blistered area with drainage that she states drained for 3 days.  This could likely be the cause for her increased lymph node swelling to groin area at that time.   We will continue to monitor for increased swollen lymph nodes in groins, axillary, neck regions.  Denies any unintentional weight loss, fever, fatigue.  Is requesting referral to Bariatrics at [facility], states she called and was told they take her insurance.  No other issues reported today.  Has lost about 6 pound since her last visit in October.  Walking increases the pain in her legs.  Was told to wear compression stockings by cardiology for venous insufficiency.    10/28/22: FU BP. C/o frequent urination. BP today is  "114/88. C/o frequent urination.  Will check UA. Vitamin D level was 20 in September.  Due for recheck once high dose vitamin d is done (December). Has contacted [facility]-they do not accept Medicaid.   C/o migraine headaches to right side of temple and back of head that occur daily and associated with aura of lightheadedness and photosensitivity.  Denies vomiting.  Takes BC powder, excedrine migraine and advil without relief. Headaches are worsening in intensity and frequency in last 2 months.  Mother with hx aneurysm requiring craniotomy.  Has had eye exam and vision is without impairment, not needing correction.    9/29/22:  FU BP. I added HCTZ to regimen last visit and told to return in 2 weeks for BP check.     9/13/22:  Here to establish care.  Unsure if she is pregnant or not. C/o breast pain bilaterally, needs GYN referral.  Also c/o pain to both legs for a month with swelling.  Was on losartan for bp but stopped taking on her own.  She gets occasional "Chest pulling' and has family history of CAD.  Denies diaphoresis, does report heart racing and skipping beats occasionally.  C/o right wrist pain and numbness to right hand.  Bilateral heel pain and swelling to ankles.  C/o migraine headaches to right side of temple and back of head that occur daily and associated with aura of lightheadedness and photosensitivity.  Denies vomiting.  Takes BC powder, excedrine migraine and advil without relief.                  ALLERGIES: Review of patient's allergies indicates:  No Known Allergies      ROS:  Review of Systems   Constitutional: Negative.    HENT: Negative.     Eyes: Negative.    Respiratory: Negative.     Cardiovascular: Negative.    Gastrointestinal: Negative.    Genitourinary: Negative.    Musculoskeletal: Negative.    Skin: Negative.    Neurological: Negative.    Endo/Heme/Allergies: Negative.    Psychiatric/Behavioral: Negative.     All other systems reviewed and are " "negative.      OBJECTIVE DATA:  Vital signs  Vitals:    03/16/23 0735   BP: 128/89   BP Location: Left arm   Patient Position: Sitting   BP Method: Large (Automatic)   Pulse: 83   Resp: 20   Temp: 98.1 °F (36.7 °C)   TempSrc: Oral   SpO2: 100%   Weight: 114.3 kg (252 lb)   Height: 5' 7" (1.702 m)      Body mass index is 39.47 kg/m².    PHYSICAL EXAM:   Physical Exam  Vitals and nursing note reviewed.   HENT:      Head: Normocephalic and atraumatic.   Cardiovascular:      Rate and Rhythm: Normal rate and regular rhythm.      Pulses: Normal pulses.      Heart sounds: Normal heart sounds.   Pulmonary:      Breath sounds: Normal breath sounds.   Musculoskeletal:         General: Normal range of motion.      Cervical back: Normal range of motion.   Skin:     General: Skin is warm and dry.   Neurological:      General: No focal deficit present.      Mental Status: She is alert and oriented to person, place, and time.   Psychiatric:         Mood and Affect: Mood normal.        ASSESSMENT/PLAN:  1. Missed period  Assessment & Plan:  UPT, serum pregnancy test, prolactin level, LH, FSH      Orders:  -     POCT Urine Pregnancy  -     HCG, Serum, Qualitative  -     Prolactin  -     Follicle Stimulating Hormone  -     Luteinizing Hormone    2. Vitamin D deficiency  -     Vitamin D           RESULTS:  Recent Results (from the past 1008 hour(s))   POCT Urine Pregnancy    Collection Time: 03/16/23  7:50 AM   Result Value Ref Range    POC Preg Test, Ur Negative Negative     Acceptable Yes          Follow Up:  Follow up in about 2 months (around 5/29/2023) for Wellness.               This note was created with the assistance of a voice recognition software or phone dictation. There may be transcription errors as a result of using this technology however minimal. Effort has been made to assure accuracy of transcription but any obvious errors or omissions should be clarified with the author of the document    "

## 2023-03-16 NOTE — PROGRESS NOTES
I have sent medications and/or lab orders in for this patient.  Please notify the patient.     No orders of the defined types were placed in this encounter.      Medications Ordered This Encounter   Medications    cholecalciferol, vitamin D3, 1,250 mcg (50,000 unit) capsule     Sig: Take 1 capsule (50,000 Units total) by mouth every 7 days.     Dispense:  12 capsule     Refill:  0

## 2023-03-17 ENCOUNTER — TELEPHONE (OUTPATIENT)
Dept: FAMILY MEDICINE | Facility: CLINIC | Age: 38
End: 2023-03-17
Payer: MEDICAID

## 2023-03-17 NOTE — TELEPHONE ENCOUNTER
----- Message from CAIN Agosto sent at 3/16/2023  2:37 PM CDT -----  Educate on increasing foods high in Vitamin D such as fish oil, cod liver oil, salmon, milk fortified with vitamin D.  RX Vitamin D3 12414 IU weekly x 12 weeks.  Complete entire 12 weeks of Vitamin D prescription.  After completion of prescription (12 weeks/3 months), begin taking Vitamin D 2000 I.U. tablets daily (purchase over the counter).  Repeat Vitamin D level as ordered.

## 2023-03-17 NOTE — TELEPHONE ENCOUNTER
Called patient with vit D results, she states that someone called to schedule an MRI and she didn't understand what that was for since no one called her about it. She asking for more information.

## 2023-03-28 ENCOUNTER — OFFICE VISIT (OUTPATIENT)
Dept: FAMILY MEDICINE | Facility: CLINIC | Age: 38
End: 2023-03-28
Payer: MEDICAID

## 2023-03-28 VITALS
DIASTOLIC BLOOD PRESSURE: 86 MMHG | HEART RATE: 85 BPM | WEIGHT: 252 LBS | RESPIRATION RATE: 20 BRPM | BODY MASS INDEX: 39.55 KG/M2 | TEMPERATURE: 98 F | HEIGHT: 67 IN | OXYGEN SATURATION: 100 % | SYSTOLIC BLOOD PRESSURE: 121 MMHG

## 2023-03-28 DIAGNOSIS — Z12.4 ENCOUNTER FOR PAPANICOLAOU SMEAR OF CERVIX: ICD-10-CM

## 2023-03-28 DIAGNOSIS — N92.6 MISSED PERIOD: Primary | ICD-10-CM

## 2023-03-28 DIAGNOSIS — F40.240 CLAUSTROPHOBIA: ICD-10-CM

## 2023-03-28 LAB
B-HCG UR QL: NEGATIVE
CTP QC/QA: YES

## 2023-03-28 PROCEDURE — 3074F SYST BP LT 130 MM HG: CPT | Mod: CPTII,,, | Performed by: NURSE PRACTITIONER

## 2023-03-28 PROCEDURE — 99213 PR OFFICE/OUTPT VISIT, EST, LEVL III, 20-29 MIN: ICD-10-PCS | Mod: 25,S$PBB,, | Performed by: NURSE PRACTITIONER

## 2023-03-28 PROCEDURE — 87661 TRICHOMONAS VAGINALIS AMPLIF: CPT

## 2023-03-28 PROCEDURE — 99213 OFFICE O/P EST LOW 20 MIN: CPT | Mod: PBBFAC,PN | Performed by: NURSE PRACTITIONER

## 2023-03-28 PROCEDURE — 87481 CANDIDA DNA AMP PROBE: CPT

## 2023-03-28 PROCEDURE — 81025 URINE PREGNANCY TEST: CPT | Mod: PBBFAC,PN | Performed by: NURSE PRACTITIONER

## 2023-03-28 PROCEDURE — 1160F RVW MEDS BY RX/DR IN RCRD: CPT | Mod: CPTII,,, | Performed by: NURSE PRACTITIONER

## 2023-03-28 PROCEDURE — 87511 GARDNER VAG DNA AMP PROBE: CPT

## 2023-03-28 PROCEDURE — 99213 OFFICE O/P EST LOW 20 MIN: CPT | Mod: 25,S$PBB,, | Performed by: NURSE PRACTITIONER

## 2023-03-28 PROCEDURE — 1160F PR REVIEW ALL MEDS BY PRESCRIBER/CLIN PHARMACIST DOCUMENTED: ICD-10-PCS | Mod: CPTII,,, | Performed by: NURSE PRACTITIONER

## 2023-03-28 PROCEDURE — 3008F BODY MASS INDEX DOCD: CPT | Mod: CPTII,,, | Performed by: NURSE PRACTITIONER

## 2023-03-28 PROCEDURE — 3079F DIAST BP 80-89 MM HG: CPT | Mod: CPTII,,, | Performed by: NURSE PRACTITIONER

## 2023-03-28 PROCEDURE — 88174 CYTOPATH C/V AUTO IN FLUID: CPT | Performed by: NURSE PRACTITIONER

## 2023-03-28 PROCEDURE — 1159F MED LIST DOCD IN RCRD: CPT | Mod: CPTII,,, | Performed by: NURSE PRACTITIONER

## 2023-03-28 PROCEDURE — 3008F PR BODY MASS INDEX (BMI) DOCUMENTED: ICD-10-PCS | Mod: CPTII,,, | Performed by: NURSE PRACTITIONER

## 2023-03-28 PROCEDURE — 87591 N.GONORRHOEAE DNA AMP PROB: CPT

## 2023-03-28 PROCEDURE — 3079F PR MOST RECENT DIASTOLIC BLOOD PRESSURE 80-89 MM HG: ICD-10-PCS | Mod: CPTII,,, | Performed by: NURSE PRACTITIONER

## 2023-03-28 PROCEDURE — 3074F PR MOST RECENT SYSTOLIC BLOOD PRESSURE < 130 MM HG: ICD-10-PCS | Mod: CPTII,,, | Performed by: NURSE PRACTITIONER

## 2023-03-28 PROCEDURE — 1159F PR MEDICATION LIST DOCUMENTED IN MEDICAL RECORD: ICD-10-PCS | Mod: CPTII,,, | Performed by: NURSE PRACTITIONER

## 2023-03-28 PROCEDURE — 87491 CHLMYD TRACH DNA AMP PROBE: CPT

## 2023-03-28 RX ORDER — ALPRAZOLAM 0.5 MG/1
0.5 TABLET ORAL ONCE
Qty: 1 TABLET | Refills: 0 | Status: SHIPPED | OUTPATIENT
Start: 2023-03-28 | End: 2023-05-17

## 2023-03-28 NOTE — PROGRESS NOTES
"Subjective:       Ida Saxena is a 38 y.o. woman who comes in today for a  pap smear only. Her most recent annual exam was on 1 year ago. Her most recent Pap smear was on 1 year ago and showed no abnormalities. Previous abnormal Pap smears: no. Contraception: condoms    The following portions of the patient's history were reviewed and updated as appropriate: allergies, current medications, past family history, past medical history, past social history, past surgical history, and problem list.    Review of Systems  A comprehensive review of systems was negative.     Objective:      /86 (BP Location: Right arm, Patient Position: Sitting, BP Method: Large (Automatic))   Pulse 85   Temp 98 °F (36.7 °C) (Oral)   Resp 20   Ht 5' 7" (1.702 m)   Wt 114.3 kg (252 lb)   LMP 01/10/2023 (Exact Date)   SpO2 100%   BMI 39.47 kg/m²     Pelvic Exam: cervix normal in appearance, external genitalia normal, and vagina normal without discharge    Pap smear obtained.     Assessment:      Screening pap smear.     Plan:      Follow up in 1 year, or as indicated by Pap results.     "

## 2023-03-30 ENCOUNTER — HOSPITAL ENCOUNTER (OUTPATIENT)
Dept: RADIOLOGY | Facility: HOSPITAL | Age: 38
Discharge: HOME | End: 2023-03-30
Attending: NURSE PRACTITIONER
Payer: MEDICAID

## 2023-03-30 ENCOUNTER — TELEPHONE (OUTPATIENT)
Dept: FAMILY MEDICINE | Facility: CLINIC | Age: 38
End: 2023-03-30
Payer: MEDICAID

## 2023-03-30 ENCOUNTER — TELEPHONE (OUTPATIENT)
Dept: FAMILY MEDICINE | Facility: CLINIC | Age: 38
End: 2023-03-30

## 2023-03-30 DIAGNOSIS — D35.2 PITUITARY MICROADENOMA WITH HYPERPROLACTINEMIA: Primary | ICD-10-CM

## 2023-03-30 DIAGNOSIS — E22.9 PITUITARY MICROADENOMA WITH HYPERPROLACTINEMIA: Primary | ICD-10-CM

## 2023-03-30 DIAGNOSIS — R79.89 ELEVATED PROLACTIN LEVEL: ICD-10-CM

## 2023-03-30 PROCEDURE — A9577 INJ MULTIHANCE: HCPCS | Performed by: NURSE PRACTITIONER

## 2023-03-30 PROCEDURE — 25500020 PHARM REV CODE 255: Performed by: NURSE PRACTITIONER

## 2023-03-30 PROCEDURE — 70553 MRI BRAIN STEM W/O & W/DYE: CPT | Mod: TC

## 2023-03-30 RX ADMIN — GADOBENATE DIMEGLUMINE 35 ML: 529 INJECTION, SOLUTION INTRAVENOUS at 12:03

## 2023-03-30 NOTE — TELEPHONE ENCOUNTER
Ramesh not set up on phone.  I need to speak to her to discuss her MRI results and to talk about further treatment options.

## 2023-03-30 NOTE — PROGRESS NOTES
I have sent medications and/or lab orders in for this patient.  Please notify the patient.     Orders Placed This Encounter   Procedures    Ambulatory referral/consult to Endocrinology     Standing Status:   Future     Standing Expiration Date:   4/30/2024     Referral Priority:   Urgent     Referral Type:   Consultation     Requested Specialty:   Endocrinology     Number of Visits Requested:   1    Ambulatory referral/consult to Neurosurgery     Standing Status:   Future     Standing Expiration Date:   4/30/2024     Referral Priority:   Urgent     Referral Type:   Consultation     Referral Reason:   Specialty Services Required     Referred to Provider:   [facility] - Neurosurgery     Requested Specialty:   Neurosurgery     Number of Visits Requested:   1

## 2023-03-31 ENCOUNTER — TELEPHONE (OUTPATIENT)
Dept: FAMILY MEDICINE | Facility: CLINIC | Age: 38
End: 2023-03-31
Payer: MEDICAID

## 2023-04-03 ENCOUNTER — TELEPHONE (OUTPATIENT)
Dept: FAMILY MEDICINE | Facility: CLINIC | Age: 38
End: 2023-04-03

## 2023-04-03 ENCOUNTER — TELEPHONE (OUTPATIENT)
Dept: FAMILY MEDICINE | Facility: CLINIC | Age: 38
End: 2023-04-03
Payer: MEDICAID

## 2023-04-03 LAB — PSYCHE PATHOLOGY RESULT: NORMAL

## 2023-04-03 NOTE — TELEPHONE ENCOUNTER
----- Message from CAIN Agosto sent at 4/3/2023  8:42 AM CDT -----  Please contact the patient and let them know that PAP was negative.

## 2023-04-04 ENCOUNTER — TELEPHONE (OUTPATIENT)
Dept: FAMILY MEDICINE | Facility: CLINIC | Age: 38
End: 2023-04-04
Payer: MEDICAID

## 2023-04-04 DIAGNOSIS — D35.2 PITUITARY MICROADENOMA WITH HYPERPROLACTINEMIA: Primary | ICD-10-CM

## 2023-04-04 DIAGNOSIS — E22.9 PITUITARY MICROADENOMA WITH HYPERPROLACTINEMIA: Primary | ICD-10-CM

## 2023-04-04 NOTE — PROGRESS NOTES
I have sent medications and/or lab orders in for this patient.  Please notify the patient.     Orders Placed This Encounter   Procedures    Ambulatory referral/consult to Endocrinology     Standing Status:   Future     Standing Expiration Date:   5/4/2024     Referral Priority:   Routine     Referral Type:   Consultation     Requested Specialty:   Endocrinology     Number of Visits Requested:   1

## 2023-04-10 LAB
CREAT SERPL-MCNC: 0.8 MG/DL (ref 0.5–1.4)
SAMPLE: NORMAL

## 2023-04-17 ENCOUNTER — TELEPHONE (OUTPATIENT)
Dept: FAMILY MEDICINE | Facility: CLINIC | Age: 38
End: 2023-04-17
Payer: MEDICAID

## 2023-04-17 DIAGNOSIS — E22.9 PITUITARY MICROADENOMA WITH HYPERPROLACTINEMIA: Primary | ICD-10-CM

## 2023-04-17 DIAGNOSIS — D35.2 PITUITARY MICROADENOMA WITH HYPERPROLACTINEMIA: Primary | ICD-10-CM

## 2023-04-17 NOTE — PROGRESS NOTES
I have sent medications and/or lab orders in for this patient.  Please notify the patient.     Orders Placed This Encounter   Procedures    Ambulatory referral/consult to Neurosurgery     Standing Status:   Future     Standing Expiration Date:   5/17/2024     Referral Priority:   Routine     Referral Type:   Consultation     Referral Reason:   Specialty Services Required     Referred to Provider:   [facility]     Requested Specialty:   Neurosurgery     Number of Visits Requested:   1

## 2023-05-11 DIAGNOSIS — R10.9 STOMACH ACHE: Primary | ICD-10-CM

## 2023-05-16 ENCOUNTER — HOSPITAL ENCOUNTER (OUTPATIENT)
Dept: RADIOLOGY | Facility: HOSPITAL | Age: 38
Discharge: HOME | End: 2023-05-16
Attending: OBSTETRICS & GYNECOLOGY
Payer: MEDICAID

## 2023-05-16 DIAGNOSIS — R10.9 STOMACH ACHE: ICD-10-CM

## 2023-05-16 PROCEDURE — 76856 US EXAM PELVIC COMPLETE: CPT | Mod: TC

## 2023-05-17 ENCOUNTER — OFFICE VISIT (OUTPATIENT)
Dept: FAMILY MEDICINE | Facility: CLINIC | Age: 38
End: 2023-05-17
Payer: MEDICAID

## 2023-05-17 VITALS
TEMPERATURE: 98 F | HEART RATE: 86 BPM | HEIGHT: 67 IN | OXYGEN SATURATION: 100 % | SYSTOLIC BLOOD PRESSURE: 112 MMHG | DIASTOLIC BLOOD PRESSURE: 79 MMHG | WEIGHT: 253 LBS | BODY MASS INDEX: 39.71 KG/M2 | RESPIRATION RATE: 20 BRPM

## 2023-05-17 DIAGNOSIS — G43.109 MIGRAINE WITH AURA AND WITHOUT STATUS MIGRAINOSUS, NOT INTRACTABLE: ICD-10-CM

## 2023-05-17 DIAGNOSIS — D64.9 ANEMIA, UNSPECIFIED TYPE: ICD-10-CM

## 2023-05-17 DIAGNOSIS — Z00.00 ENCOUNTER FOR WELLNESS EXAMINATION: ICD-10-CM

## 2023-05-17 DIAGNOSIS — E66.9 OBESITY, UNSPECIFIED CLASSIFICATION, UNSPECIFIED OBESITY TYPE, UNSPECIFIED WHETHER SERIOUS COMORBIDITY PRESENT: ICD-10-CM

## 2023-05-17 DIAGNOSIS — M79.671 BILATERAL FOOT PAIN: ICD-10-CM

## 2023-05-17 DIAGNOSIS — I10 PRIMARY HYPERTENSION: ICD-10-CM

## 2023-05-17 DIAGNOSIS — Z72.0 TOBACCO USER: ICD-10-CM

## 2023-05-17 DIAGNOSIS — K92.1 BLOOD IN STOOL: Primary | ICD-10-CM

## 2023-05-17 DIAGNOSIS — N89.8 VAGINAL ODOR: ICD-10-CM

## 2023-05-17 DIAGNOSIS — M79.672 BILATERAL FOOT PAIN: ICD-10-CM

## 2023-05-17 PROBLEM — N92.6 MISSED PERIOD: Status: RESOLVED | Noted: 2023-03-16 | Resolved: 2023-05-17

## 2023-05-17 PROBLEM — R07.9 CHEST PAIN: Status: RESOLVED | Noted: 2022-09-13 | Resolved: 2023-05-17

## 2023-05-17 PROBLEM — Z12.4 ENCOUNTER FOR PAPANICOLAOU SMEAR OF CERVIX: Status: RESOLVED | Noted: 2023-03-28 | Resolved: 2023-05-17

## 2023-05-17 LAB
BASOPHILS # BLD AUTO: 0.03 X10(3)/MCL
BASOPHILS NFR BLD AUTO: 0.5 %
CLUE CELLS VAG QL WET PREP: NORMAL
EOSINOPHIL # BLD AUTO: 0.21 X10(3)/MCL (ref 0–0.9)
EOSINOPHIL NFR BLD AUTO: 3.3 %
ERYTHROCYTE [DISTWIDTH] IN BLOOD BY AUTOMATED COUNT: 18.6 % (ref 11.5–17)
FERRITIN SERPL-MCNC: 18.64 NG/ML (ref 4.63–204)
HCT VFR BLD AUTO: 28.6 % (ref 37–47)
HGB BLD-MCNC: 7.9 G/DL (ref 12–16)
IMM GRANULOCYTES # BLD AUTO: 0.08 X10(3)/MCL (ref 0–0.04)
IMM GRANULOCYTES NFR BLD AUTO: 1.3 %
IRON SATN MFR SERPL: 73 % (ref 20–50)
IRON SERPL-MCNC: 256 UG/DL (ref 50–170)
LYMPHOCYTES # BLD AUTO: 1.62 X10(3)/MCL (ref 0.6–4.6)
LYMPHOCYTES NFR BLD AUTO: 25.6 %
MCH RBC QN AUTO: 22.8 PG (ref 27–31)
MCHC RBC AUTO-ENTMCNC: 27.6 G/DL (ref 33–36)
MCV RBC AUTO: 82.4 FL (ref 80–94)
MONOCYTES # BLD AUTO: 0.37 X10(3)/MCL (ref 0.1–1.3)
MONOCYTES NFR BLD AUTO: 5.9 %
NEUTROPHILS # BLD AUTO: 4.01 X10(3)/MCL (ref 2.1–9.2)
NEUTROPHILS NFR BLD AUTO: 63.4 %
NRBC BLD AUTO-RTO: 0.3 %
PLATELET # BLD AUTO: 306 X10(3)/MCL (ref 130–400)
PMV BLD AUTO: 9.7 FL (ref 7.4–10.4)
RBC # BLD AUTO: 3.47 X10(6)/MCL (ref 4.2–5.4)
RET# (OHS): 0.22 (ref 0.02–0.08)
RETICULOCYTE COUNT AUTOMATED (OLG): 6.34 % (ref 1.1–2.1)
T VAGINALIS VAG QL WET PREP: NORMAL
TIBC SERPL-MCNC: 353 UG/DL (ref 250–450)
TIBC SERPL-MCNC: 97 UG/DL (ref 70–310)
TRANSFERRIN SERPL-MCNC: 290 MG/DL (ref 180–382)
WBC # SPEC AUTO: 6.32 X10(3)/MCL (ref 4.5–11.5)
WBC #/AREA VAG WET PREP: NORMAL
YEAST SPEC QL WET PREP: NORMAL

## 2023-05-17 PROCEDURE — 1160F RVW MEDS BY RX/DR IN RCRD: CPT | Mod: CPTII,,, | Performed by: NURSE PRACTITIONER

## 2023-05-17 PROCEDURE — 3078F DIAST BP <80 MM HG: CPT | Mod: CPTII,,, | Performed by: NURSE PRACTITIONER

## 2023-05-17 PROCEDURE — 82728 ASSAY OF FERRITIN: CPT | Performed by: NURSE PRACTITIONER

## 2023-05-17 PROCEDURE — 1159F PR MEDICATION LIST DOCUMENTED IN MEDICAL RECORD: ICD-10-PCS | Mod: CPTII,,, | Performed by: NURSE PRACTITIONER

## 2023-05-17 PROCEDURE — 3074F PR MOST RECENT SYSTOLIC BLOOD PRESSURE < 130 MM HG: ICD-10-PCS | Mod: CPTII,,, | Performed by: NURSE PRACTITIONER

## 2023-05-17 PROCEDURE — 83550 IRON BINDING TEST: CPT | Performed by: NURSE PRACTITIONER

## 2023-05-17 PROCEDURE — 99214 OFFICE O/P EST MOD 30 MIN: CPT | Mod: S$PBB,,, | Performed by: NURSE PRACTITIONER

## 2023-05-17 PROCEDURE — 3078F PR MOST RECENT DIASTOLIC BLOOD PRESSURE < 80 MM HG: ICD-10-PCS | Mod: CPTII,,, | Performed by: NURSE PRACTITIONER

## 2023-05-17 PROCEDURE — 1159F MED LIST DOCD IN RCRD: CPT | Mod: CPTII,,, | Performed by: NURSE PRACTITIONER

## 2023-05-17 PROCEDURE — 3074F SYST BP LT 130 MM HG: CPT | Mod: CPTII,,, | Performed by: NURSE PRACTITIONER

## 2023-05-17 PROCEDURE — 85025 COMPLETE CBC W/AUTO DIFF WBC: CPT | Performed by: NURSE PRACTITIONER

## 2023-05-17 PROCEDURE — 1160F PR REVIEW ALL MEDS BY PRESCRIBER/CLIN PHARMACIST DOCUMENTED: ICD-10-PCS | Mod: CPTII,,, | Performed by: NURSE PRACTITIONER

## 2023-05-17 PROCEDURE — 36415 COLL VENOUS BLD VENIPUNCTURE: CPT | Performed by: NURSE PRACTITIONER

## 2023-05-17 PROCEDURE — 87210 SMEAR WET MOUNT SALINE/INK: CPT | Performed by: NURSE PRACTITIONER

## 2023-05-17 PROCEDURE — 3008F PR BODY MASS INDEX (BMI) DOCUMENTED: ICD-10-PCS | Mod: CPTII,,, | Performed by: NURSE PRACTITIONER

## 2023-05-17 PROCEDURE — 99214 OFFICE O/P EST MOD 30 MIN: CPT | Mod: PBBFAC,PN | Performed by: NURSE PRACTITIONER

## 2023-05-17 PROCEDURE — 85045 AUTOMATED RETICULOCYTE COUNT: CPT | Performed by: NURSE PRACTITIONER

## 2023-05-17 PROCEDURE — 3008F BODY MASS INDEX DOCD: CPT | Mod: CPTII,,, | Performed by: NURSE PRACTITIONER

## 2023-05-17 PROCEDURE — 99214 PR OFFICE/OUTPT VISIT, EST, LEVL IV, 30-39 MIN: ICD-10-PCS | Mod: S$PBB,,, | Performed by: NURSE PRACTITIONER

## 2023-05-17 RX ORDER — MEDROXYPROGESTERONE ACETATE 10 MG/1
10 TABLET ORAL
COMMUNITY
Start: 2023-05-08 | End: 2023-09-14 | Stop reason: ALTCHOICE

## 2023-05-17 RX ORDER — FERROUS SULFATE TAB 325 MG (65 MG ELEMENTAL FE) 325 (65 FE) MG
TAB ORAL
COMMUNITY
Start: 2023-05-08 | End: 2023-05-17 | Stop reason: SDUPTHER

## 2023-05-17 RX ORDER — CYCLOBENZAPRINE HCL 5 MG
TABLET ORAL
COMMUNITY
Start: 2023-05-04 | End: 2023-09-14 | Stop reason: ALTCHOICE

## 2023-05-17 RX ORDER — FERROUS SULFATE TAB 325 MG (65 MG ELEMENTAL FE) 325 (65 FE) MG
325 TAB ORAL
Qty: 90 TABLET | Refills: 3 | Status: SHIPPED | OUTPATIENT
Start: 2023-05-17 | End: 2023-08-09

## 2023-05-17 NOTE — ASSESSMENT & PLAN NOTE
Referred previously for bariatric surgery, number given to call them back as she has not heard back from anyone lately.   BMI Body mass index is 39.63 kg/m².   Goal BMI <30.  Exercise 5 times a week for 30 minutes per day.  Avoid soda, simple sugars, excessive rice, potatoes or bread. Limit fast foods and fried foods.  Choose complex carbs in moderation (example: green vegetables, beans, oatmeal). Eat plenty of fresh fruits and vegetables with lean meats daily.  Do not skip meals. Eat a balanced portion size.  Avoid fad diets. Consider permanent healthy life style changes.

## 2023-05-17 NOTE — ASSESSMENT & PLAN NOTE
Chronic, stable on HCTZ 25mg po daily  CMP today  Low Sodium Diet (Dash Diet - less than 2 grams of sodium per day).  Monitor Blood Pressure daily and log. Report any consistent numbers greater than 140/90.  Smoking Cessation encouraged to aid in BP reduction.  Maintain healthy weight with goal BMI <30. Exercise 30 minutes per day 5 days per week

## 2023-05-17 NOTE — PROGRESS NOTES
Patient Name: Ida Saxena   : 1985  MRN: [id number]     SUBJECTIVE DATA:    CHIEF COMPLAINT:   Ida Saxena is a 38 y.o. female who presents to clinic today with Follow-up (2 month f/u for wellness)        HPI:  23:  Follow-up wellness visit.  Patient has been seen by Neurosurgery in [facility] and was told that he didn't see on MRI what was read by radiologist regarding pituitary adenoma so he will perform his own MRI and reassess in  or July.    She is still taking iron tablets but is only taking once daily.  She is still feeling lightheaded at times and dizzy.  Is drinking enough water.  States she saw GYN and plan is to do pelvic ultrasound to view uterus. She had one done but ovaries were obscured by bowel gas.  Transvaginal was done but I am unable to see that report.  She states she was put on Provera by [facility] to stop the bleeding which it does but she is supposed to stop taking it once bleeding stops and she starts spotting once she stops it so she just keeps taking it daily to minimize her bleeding.    She has venous insufficiency-referred to vascular in [facility] and told to wear compression stockings which help her tremendously.    She has been having some pain to upper back area near base of neck.  She was prescribed Flexeril for muscle spasms but has not picked up  yet from pharmacy.    States with all of her bleeding, she has upset pH balance and has noticed vaginal odor lately.      3/16/23: Pt missed period and had negative pregnancy test 1 week ago, now having spotting.  Period , no period in February and is now spotting in March.  No appt [facility] GYN yet, referred in September.  Due for PAP.  Denies increased stress levels, no changes to diet, no hair to chin/face.  US pelvis in January did not show any significant issues inside her pelvis.      23:  Follow-up HTN, migraines, chest pain.  Referred to Cardiology for chest pain issues and pain to both legs with swelling,  had appointment last time she was seen.   Needs referral to Neurology for preventive migraine treatment. Imitrex helping but insurance only covers 8 pills and she has used 6 since it was prescribed in November.  MRI was normal.  She does tell me that at the time the US was done on her legs, she had a lesion near her genitals but not directly on it that sounds more like folliculitis to me but the picture represents an open blistered area with drainage that she states drained for 3 days.  This could likely be the cause for her increased lymph node swelling to groin area at that time.   We will continue to monitor for increased swollen lymph nodes in groins, axillary, neck regions.  Denies any unintentional weight loss, fever, fatigue.  Is requesting referral to Bariatrics at [facility], states she called and was told they take her insurance.  No other issues reported today.  Has lost about 6 pound since her last visit in October.  Walking increases the pain in her legs.  Was told to wear compression stockings by cardiology for venous insufficiency.    10/28/22: FU BP. C/o frequent urination. BP today is 114/88. C/o frequent urination.  Will check UA. Vitamin D level was 20 in September.  Due for recheck once high dose vitamin d is done (December). Has contacted [facility]-they do not accept Medicaid.   C/o migraine headaches to right side of temple and back of head that occur daily and associated with aura of lightheadedness and photosensitivity.  Denies vomiting.  Takes BC powder, excedrine migraine and advil without relief. Headaches are worsening in intensity and frequency in last 2 months.  Mother with hx aneurysm requiring craniotomy.  Has had eye exam and vision is without impairment, not needing correction.    9/29/22:  FU BP. I added HCTZ to regimen last visit and told to return in 2 weeks for BP check.     9/13/22:  Here to establish care.  Unsure if she is pregnant or not. C/o  "breast pain bilaterally, needs GYN referral.  Also c/o pain to both legs for a month with swelling.  Was on losartan for bp but stopped taking on her own.  She gets occasional "Chest pulling' and has family history of CAD.  Denies diaphoresis, does report heart racing and skipping beats occasionally.  C/o right wrist pain and numbness to right hand.  Bilateral heel pain and swelling to ankles.  C/o migraine headaches to right side of temple and back of head that occur daily and associated with aura of lightheadedness and photosensitivity.  Denies vomiting.  Takes BC powder, excedrine migraine and advil without relief.            ALLERGIES: Review of patient's allergies indicates:  No Known Allergies      ROS:  Review of Systems   Constitutional:  Negative for malaise/fatigue.   Gastrointestinal:  Positive for blood in stool.   Musculoskeletal:  Positive for myalgias and neck pain.   Neurological:  Positive for headaches.   Psychiatric/Behavioral: Negative.         OBJECTIVE DATA:  Vital signs  Vitals:    05/17/23 0732   BP: 112/79   BP Location: Right arm   Patient Position: Sitting   BP Method: Large (Automatic)   Pulse: 86   Resp: 20   Temp: 98.1 °F (36.7 °C)   TempSrc: Oral   SpO2: 100%   Weight: 114.8 kg (253 lb)   Height: 5' 7" (1.702 m)      Body mass index is 39.63 kg/m².    PHYSICAL EXAM:   Physical Exam  Vitals and nursing note reviewed.   HENT:      Head: Normocephalic and atraumatic.   Cardiovascular:      Rate and Rhythm: Normal rate and regular rhythm.      Pulses: Normal pulses.      Heart sounds: Normal heart sounds.   Pulmonary:      Breath sounds: Normal breath sounds.   Musculoskeletal:         General: Normal range of motion.      Cervical back: Normal range of motion.   Skin:     General: Skin is warm and dry.   Neurological:      General: No focal deficit present.      Mental Status: She is alert and oriented to person, place, and time.   Psychiatric:         Mood and Affect: Mood normal.    "     ASSESSMENT/PLAN:  1. Blood in stool  Assessment & Plan:  Occult stool ordered, pt is on iron.     Orders:  -     OCCULT BLOOD FECAL IMMUNOASSAY; Future; Expected date: 05/17/2023  -     CBC Auto Differential    2. Anemia, unspecified type  -     CBC Auto Differential  -     FEROSUL 325 mg (65 mg iron) Tab tablet; Take 1 tablet (325 mg total) by mouth 3 (three) times daily before meals.  Dispense: 90 tablet; Refill: 3  -     Ferritin  -     Reticulocytes  -     Iron and TIBC    3. Vaginal odor  Assessment & Plan:  Wet prep      Orders:  -     Wet Prep, Genital    4. Encounter for wellness examination  -     Lipid panel; Future; Expected date: 05/17/2023    5. Primary hypertension  Overview:  BP Readings from Last 3 Encounters:   05/17/23 112/79   03/28/23 121/86   03/16/23 128/89         Assessment & Plan:  Chronic, stable on HCTZ 25mg po daily  CMP today  Low Sodium Diet (Dash Diet - less than 2 grams of sodium per day).  Monitor Blood Pressure daily and log. Report any consistent numbers greater than 140/90.  Smoking Cessation encouraged to aid in BP reduction.  Maintain healthy weight with goal BMI <30. Exercise 30 minutes per day 5 days per week      Orders:  -     Cancel: Comprehensive Metabolic Panel  -     Comprehensive Metabolic Panel; Future; Expected date: 05/17/2023    6. Migraine with aura and without status migrainosus, not intractable  Assessment & Plan:  Stable on Imitrex 50mg which is controlling migraines  Headache education provided - including good sleep hygiene, stress management, medication overuse education provided - using more 3 OTC per week may worsen headaches, High intensity interval training has shown to reduce headache frequency. Low carb, high protein has shown to reduce headache frequency. Patient is instructed to keep headache diary.        7. Obesity, unspecified classification, unspecified obesity type, unspecified whether serious comorbidity present  Overview:  Wt Readings from  Last 3 Encounters:   05/17/23 0732 114.8 kg (253 lb)   03/28/23 0935 114.3 kg (252 lb)   03/16/23 0735 114.3 kg (252 lb)         Assessment & Plan:  Referred previously for bariatric surgery, number given to call them back as she has not heard back from anyone lately.   BMI Body mass index is 39.63 kg/m².   Goal BMI <30.  Exercise 5 times a week for 30 minutes per day.  Avoid soda, simple sugars, excessive rice, potatoes or bread. Limit fast foods and fried foods.  Choose complex carbs in moderation (example: green vegetables, beans, oatmeal). Eat plenty of fresh fruits and vegetables with lean meats daily.  Do not skip meals. Eat a balanced portion size.  Avoid fad diets. Consider permanent healthy life style changes.           8. Bilateral foot pain  Assessment & Plan:  Improved by compression stockings, keep FU with vascular      9. Tobacco user  Assessment & Plan:  Pt stopped smoking over 6 months ago.              RESULTS:  No results found for this or any previous visit (from the past 1008 hour(s)).      Follow Up:  Follow up in about 4 months (around 9/17/2023) for CRUZITO, Foot pain, Venous insufficiency, Obesity, Migraines, HTN.               This note was created with the assistance of a voice recognition software or phone dictation. There may be transcription errors as a result of using this technology however minimal. Effort has been made to assure accuracy of transcription but any obvious errors or omissions should be clarified with the author of the document

## 2023-05-17 NOTE — ASSESSMENT & PLAN NOTE
Stable on Imitrex 50mg which is controlling migraines  Headache education provided - including good sleep hygiene, stress management, medication overuse education provided - using more 3 OTC per week may worsen headaches, High intensity interval training has shown to reduce headache frequency. Low carb, high protein has shown to reduce headache frequency. Patient is instructed to keep headache diary.

## 2023-05-18 ENCOUNTER — CLINICAL SUPPORT (OUTPATIENT)
Dept: FAMILY MEDICINE | Facility: CLINIC | Age: 38
End: 2023-05-18
Payer: MEDICAID

## 2023-05-18 DIAGNOSIS — Z00.00 ENCOUNTER FOR WELLNESS EXAMINATION: ICD-10-CM

## 2023-05-18 DIAGNOSIS — I10 PRIMARY HYPERTENSION: ICD-10-CM

## 2023-05-18 LAB
ALBUMIN SERPL-MCNC: 3.6 G/DL (ref 3.5–5)
ALBUMIN/GLOB SERPL: 1 RATIO (ref 1.1–2)
ALP SERPL-CCNC: 74 UNIT/L (ref 40–150)
ALT SERPL-CCNC: 16 UNIT/L (ref 0–55)
AST SERPL-CCNC: 13 UNIT/L (ref 5–34)
BILIRUBIN DIRECT+TOT PNL SERPL-MCNC: 0.2 MG/DL
BUN SERPL-MCNC: 11.8 MG/DL (ref 7–18.7)
CALCIUM SERPL-MCNC: 9.5 MG/DL (ref 8.4–10.2)
CHLORIDE SERPL-SCNC: 108 MMOL/L (ref 98–107)
CHOLEST SERPL-MCNC: 124 MG/DL
CHOLEST/HDLC SERPL: 4 {RATIO} (ref 0–5)
CO2 SERPL-SCNC: 27 MMOL/L (ref 22–29)
CREAT SERPL-MCNC: 0.81 MG/DL (ref 0.55–1.02)
GFR SERPLBLD CREATININE-BSD FMLA CKD-EPI: >60 MLS/MIN/1.73/M2
GLOBULIN SER-MCNC: 3.5 GM/DL (ref 2.4–3.5)
GLUCOSE SERPL-MCNC: 100 MG/DL (ref 74–100)
HDLC SERPL-MCNC: 32 MG/DL (ref 35–60)
LDLC SERPL CALC-MCNC: 79 MG/DL (ref 50–140)
POTASSIUM SERPL-SCNC: 3.9 MMOL/L (ref 3.5–5.1)
PROT SERPL-MCNC: 7.1 GM/DL (ref 6.4–8.3)
SODIUM SERPL-SCNC: 140 MMOL/L (ref 136–145)
TRIGL SERPL-MCNC: 64 MG/DL (ref 37–140)
VLDLC SERPL CALC-MCNC: 13 MG/DL

## 2023-05-18 PROCEDURE — 80053 COMPREHEN METABOLIC PANEL: CPT

## 2023-05-18 PROCEDURE — 80061 LIPID PANEL: CPT

## 2023-05-18 PROCEDURE — 36415 COLL VENOUS BLD VENIPUNCTURE: CPT

## 2023-05-19 ENCOUNTER — TELEPHONE (OUTPATIENT)
Dept: FAMILY MEDICINE | Facility: CLINIC | Age: 38
End: 2023-05-19
Payer: MEDICAID

## 2023-05-19 ENCOUNTER — APPOINTMENT (OUTPATIENT)
Dept: LAB | Facility: HOSPITAL | Age: 38
End: 2023-05-19
Attending: NURSE PRACTITIONER
Payer: MEDICAID

## 2023-05-19 LAB — HEMOCCULT SP1 STL QL: NEGATIVE

## 2023-05-26 ENCOUNTER — TELEPHONE (OUTPATIENT)
Dept: FAMILY MEDICINE | Facility: CLINIC | Age: 38
End: 2023-05-26
Payer: MEDICAID

## 2023-05-29 ENCOUNTER — TELEPHONE (OUTPATIENT)
Dept: FAMILY MEDICINE | Facility: CLINIC | Age: 38
End: 2023-05-29

## 2023-05-31 ENCOUNTER — OFFICE VISIT (OUTPATIENT)
Dept: FAMILY MEDICINE | Facility: CLINIC | Age: 38
End: 2023-05-31
Payer: MEDICAID

## 2023-05-31 DIAGNOSIS — I10 PRIMARY HYPERTENSION: Primary | ICD-10-CM

## 2023-05-31 PROCEDURE — 99499 NO LOS: ICD-10-PCS | Mod: S$PBB,,, | Performed by: NURSE PRACTITIONER

## 2023-05-31 PROCEDURE — 99499 UNLISTED E&M SERVICE: CPT | Mod: S$PBB,,, | Performed by: NURSE PRACTITIONER

## 2023-05-31 NOTE — PROGRESS NOTES
Pt had complaint at this visit. I did not even see her.  She was upset and verbally abusive to nurse doing intake. She was taken to Supervisor's office for further consult.

## 2023-06-07 ENCOUNTER — ANESTHESIA EVENT (OUTPATIENT)
Dept: SURGERY | Facility: HOSPITAL | Age: 38
End: 2023-06-07
Payer: MEDICAID

## 2023-06-07 RX ORDER — METOCLOPRAMIDE 10 MG/1
10 TABLET ORAL
Status: CANCELLED | OUTPATIENT
Start: 2023-06-09 | End: 2023-06-09

## 2023-06-07 NOTE — ANESTHESIA PREPROCEDURE EVALUATION
2023  Ida Saxena is a 38 y.o., female   Pre-operative evaluation for Procedure(s) (LRB):  DILATION AND CURETTAGE, UTERUS fx (N/A)    Ida Saxena is a 38 y.o. female     Patient Active Problem List   Diagnosis    Obesity    Tobacco user    Hypertension    Migraine with aura and without status migrainosus, not intractable    Numbness and tingling in right hand    Bilateral foot pain    Vaginal odor    Blood in stool       Review of patient's allergies indicates:  No Known Allergies    No current facility-administered medications on file prior to encounter.     Current Outpatient Medications on File Prior to Encounter   Medication Sig Dispense Refill    hydroCHLOROthiazide (HYDRODIURIL) 25 MG tablet Take 1 tablet (25 mg total) by mouth once daily. 30 tablet 6    medroxyPROGESTERone (PROVERA) 10 MG tablet Take 10 mg by mouth.      sumatriptan (IMITREX) 50 MG tablet Take 1 tablet (50 mg total) by mouth every 6 to 8 hours as needed for Migraine. 28 tablet 3    albuterol (PROVENTIL/VENTOLIN HFA) 90 mcg/actuation inhaler SMARTSI Puff(s) By Mouth Every 6 Hours PRN      cholecalciferol, vitamin D3, 1,250 mcg (50,000 unit) capsule Take 1 capsule (50,000 Units total) by mouth every 7 days. 12 capsule 0    cyclobenzaprine (FLEXERIL) 5 MG tablet TAKE 1 TABLET BY MOUTH THREE TIMES DAILY FOR UP TO 15 DOSES AS NEEDED FOR MUSCLE SPASMS      FEROSUL 325 mg (65 mg iron) Tab tablet Take 1 tablet (325 mg total) by mouth 3 (three) times daily before meals. 90 tablet 3       Past Surgical History:   Procedure Laterality Date     SECTION      x 3     Blood Type  B Pos  CBC: H/H=10/39     No results for input(s): WBC, RBC, HGB, HCT, PLT, MCV, MCH, MCHC in the last 72 hours.    CMP 2023: WNL     No results for input(s): NA, K, CL, CO2, BUN, CREATININE, GLU, MG, PHOS, CALCIUM,  ALBUMIN, PROT, ALKPHOS, ALT, AST, BILITOT in the last 72 hours.    INR  No results for input(s): PT, INR, PROTIME, APTT in the last 72 hours.    Diagnostic Studies:  CXR : NAPD    EKG 11/10/2022 : NSR=74. PRol QT    2D Echo :  No results found for this or any previous visit..      Pre-op Assessment    I have reviewed the Patient Summary Reports.     I have reviewed the Nursing Notes. I have reviewed the NPO Status.   I have reviewed the Medications.     Review of Systems  Anesthesia Hx:  No problems with previous Anesthesia  History of prior surgery of interest to airway management or planning: Previous anesthesia: Epidural  C/S with epidural.  Denies Family Hx of Anesthesia complications.   Denies Personal Hx of Anesthesia complications.   Social:  Former Smoker Tob quit 2021.  1PPD  X 10yrs. occas SOB.  Vaping   Hematology/Oncology:     Oncology Normal    -- Anemia:   EENT/Dental:EENT/Dental Normal   Cardiovascular:   Denies Pacemaker. Hypertension  Denies CABG/stent.     Pulmonary:   Denies COPD.  Denies Asthma. Shortness of breath  Denies Sleep Apnea. Ventolin (Denied.)  Snores>   Renal/:  Renal/ Normal     Hepatic/GI:  Hepatic/GI Normal  Denies GERD.    Musculoskeletal:  Musculoskeletal Normal    OB/GYN/PEDS:  AUB.  Hx of > 10 yrs ago.   Neurological:   Denies CVA. Headaches Denies Seizures. R Hand.  Peripheral Neuropathy    Endocrine:  Endocrine Normal  Morbid Obesity / BMI > 40  Dermatological:  Skin Normal    Psych:  Psychiatric Normal           Physical Exam  General: Cooperative, Alert and Oriented    Airway:  Mallampati: III / II  Mouth Opening: Normal  TM Distance: Normal  Tongue: Normal  Neck ROM: Normal ROM    Dental:  Braces  Px denies any loose teeth.  Chest/Lungs:  Clear to auscultation, Normal Respiratory Rate    Heart:  Rate: Normal  Rhythm: Regular Rhythm        Anesthesia Plan  Type of Anesthesia, risks & benefits discussed:    Anesthesia Type: Gen ETT  Intra-op Monitoring Plan:  Standard ASA Monitors  Post Op Pain Control Plan: multimodal analgesia  Induction:  IV  Airway Plan: Direct  Informed Consent: Informed consent signed with the Patient and all parties understand the risks and agree with anesthesia plan.  All questions answered.   ASA Score: 3  Day of Surgery Review of History & Physical: H&P Update referred to the surgeon/provider.I have interviewed and examined the patient. I have reviewed the patient's H&P dated:   Anesthesia Plan Notes: CPAP/BiPAP if needed.    Ready For Surgery From Anesthesia Perspective.     .

## 2023-06-08 NOTE — DISCHARGE INSTRUCTIONS
Patient Education       Discharge Instructions     What care is needed at home?   It is normal to have vaginal bleeding for a few weeks. You may use sanitary pads, but not tampons.  Do not douche, use tampons, or have sexual contact for 2 weeks or until release by your doctor.  You may shower in 24 hours.  No tub baths, swimming, or use a hot tub until release by your doctor.  Ask your doctor about when it is safe for you to go back to your normal activities like work, driving, and sex.  Take your medications as ordered.    What follow-up care is needed?   Be sure to keep your follow-up visit.    Will physical activity be limited?   Rest for the first few days after the procedure. Avoid strenuous activities like heavy lifting and hard workouts.    What problems could happen?   Harm to the cervix  Scarring of the lining of the uterus  Infection  Bleeding  A hole in the uterus from the tools used during the procedure    When do I need to call the doctor?   Signs of infection like a fever of 100.4°F (38°C) or higher, chills, pain with passing urine, or bad smelling drainage from the vagina.  Very bad belly pain  Heavy bleeding (more than 2 pads an hour or heavier than your normal cycle)  Upset stomach and throwing up  You are not feeling better in 2 to 3 days or you are feeling worse

## 2023-06-09 ENCOUNTER — HOSPITAL ENCOUNTER (OUTPATIENT)
Facility: HOSPITAL | Age: 38
Discharge: HOME | End: 2023-06-09
Attending: OBSTETRICS & GYNECOLOGY | Admitting: OBSTETRICS & GYNECOLOGY
Payer: MEDICAID

## 2023-06-09 ENCOUNTER — ANESTHESIA (OUTPATIENT)
Dept: SURGERY | Facility: HOSPITAL | Age: 38
End: 2023-06-09
Payer: MEDICAID

## 2023-06-09 DIAGNOSIS — N93.9 ABNORMAL UTERINE BLEEDING: ICD-10-CM

## 2023-06-09 LAB
ANION GAP SERPL CALC-SCNC: 17 MMOL/L (ref 8–16)
B-HCG UR QL: NEGATIVE
BUN SERPL-MCNC: 8 MG/DL (ref 6–30)
CHLORIDE SERPL-SCNC: 109 MMOL/L (ref 95–110)
CREAT SERPL-MCNC: 0.6 MG/DL (ref 0.5–1.4)
CTP QC/QA: YES
GLUCOSE SERPL-MCNC: 106 MG/DL (ref 70–110)
HCT VFR BLD CALC: 29 %PCV (ref 36–54)
HGB BLD-MCNC: 10 G/DL
POC IONIZED CALCIUM: 1.22 MMOL/L (ref 1.06–1.42)
POC TCO2 (MEASURED): 20 MMOL/L (ref 23–29)
POTASSIUM BLD-SCNC: 3.9 MMOL/L (ref 3.5–5.1)
SAMPLE: ABNORMAL
SODIUM BLD-SCNC: 142 MMOL/L (ref 136–145)

## 2023-06-09 PROCEDURE — 63600175 PHARM REV CODE 636 W HCPCS: Performed by: NURSE ANESTHETIST, CERTIFIED REGISTERED

## 2023-06-09 PROCEDURE — 36000704 HC OR TIME LEV I 1ST 15 MIN: Performed by: OBSTETRICS & GYNECOLOGY

## 2023-06-09 PROCEDURE — 37000008 HC ANESTHESIA 1ST 15 MINUTES: Performed by: OBSTETRICS & GYNECOLOGY

## 2023-06-09 PROCEDURE — 25000003 PHARM REV CODE 250: Performed by: OBSTETRICS & GYNECOLOGY

## 2023-06-09 PROCEDURE — 71000015 HC POSTOP RECOV 1ST HR: Performed by: OBSTETRICS & GYNECOLOGY

## 2023-06-09 PROCEDURE — 63600175 PHARM REV CODE 636 W HCPCS: Performed by: ANESTHESIOLOGY

## 2023-06-09 PROCEDURE — 36000705 HC OR TIME LEV I EA ADD 15 MIN: Performed by: OBSTETRICS & GYNECOLOGY

## 2023-06-09 PROCEDURE — 25000003 PHARM REV CODE 250: Performed by: NURSE ANESTHETIST, CERTIFIED REGISTERED

## 2023-06-09 PROCEDURE — 63600175 PHARM REV CODE 636 W HCPCS

## 2023-06-09 PROCEDURE — 71000016 HC POSTOP RECOV ADDL HR: Performed by: OBSTETRICS & GYNECOLOGY

## 2023-06-09 PROCEDURE — 81025 URINE PREGNANCY TEST: CPT | Performed by: ANESTHESIOLOGY

## 2023-06-09 PROCEDURE — 25000003 PHARM REV CODE 250: Performed by: ANESTHESIOLOGY

## 2023-06-09 PROCEDURE — 71000033 HC RECOVERY, INTIAL HOUR: Performed by: OBSTETRICS & GYNECOLOGY

## 2023-06-09 PROCEDURE — 88305 TISSUE EXAM BY PATHOLOGIST: CPT | Performed by: OBSTETRICS & GYNECOLOGY

## 2023-06-09 PROCEDURE — D9220A PRA ANESTHESIA: ICD-10-PCS | Mod: ANES,,, | Performed by: ANESTHESIOLOGY

## 2023-06-09 PROCEDURE — D9220A PRA ANESTHESIA: ICD-10-PCS | Mod: CRNA,,, | Performed by: NURSE ANESTHETIST, CERTIFIED REGISTERED

## 2023-06-09 PROCEDURE — D9220A PRA ANESTHESIA: Mod: CRNA,,, | Performed by: NURSE ANESTHETIST, CERTIFIED REGISTERED

## 2023-06-09 PROCEDURE — 37000009 HC ANESTHESIA EA ADD 15 MINS: Performed by: OBSTETRICS & GYNECOLOGY

## 2023-06-09 PROCEDURE — D9220A PRA ANESTHESIA: Mod: ANES,,, | Performed by: ANESTHESIOLOGY

## 2023-06-09 RX ORDER — CELECOXIB 200 MG/1
400 CAPSULE ORAL ONCE
Status: COMPLETED | OUTPATIENT
Start: 2023-06-09 | End: 2023-06-09

## 2023-06-09 RX ORDER — PROCHLORPERAZINE EDISYLATE 5 MG/ML
5 INJECTION INTRAMUSCULAR; INTRAVENOUS EVERY 6 HOURS PRN
Status: DISCONTINUED | OUTPATIENT
Start: 2023-06-09 | End: 2023-06-09 | Stop reason: HOSPADM

## 2023-06-09 RX ORDER — SILVER NITRATE 38.21; 12.74 MG/1; MG/1
STICK TOPICAL
Status: DISCONTINUED
Start: 2023-06-09 | End: 2023-06-09 | Stop reason: HOSPADM

## 2023-06-09 RX ORDER — HYDROCODONE BITARTRATE AND ACETAMINOPHEN 5; 325 MG/1; MG/1
1 TABLET ORAL EVERY 4 HOURS PRN
Status: CANCELLED | OUTPATIENT
Start: 2023-06-09

## 2023-06-09 RX ORDER — ONDANSETRON 2 MG/ML
4 INJECTION INTRAMUSCULAR; INTRAVENOUS ONCE
Status: COMPLETED | OUTPATIENT
Start: 2023-06-09 | End: 2023-06-09

## 2023-06-09 RX ORDER — FENTANYL CITRATE 50 UG/ML
INJECTION, SOLUTION INTRAMUSCULAR; INTRAVENOUS
Status: DISCONTINUED | OUTPATIENT
Start: 2023-06-09 | End: 2023-06-09

## 2023-06-09 RX ORDER — MEPERIDINE HYDROCHLORIDE 25 MG/ML
12.5 INJECTION INTRAMUSCULAR; INTRAVENOUS; SUBCUTANEOUS EVERY 10 MIN PRN
Status: DISCONTINUED | OUTPATIENT
Start: 2023-06-09 | End: 2023-06-09 | Stop reason: HOSPADM

## 2023-06-09 RX ORDER — HYDROMORPHONE HYDROCHLORIDE 2 MG/ML
1 INJECTION, SOLUTION INTRAMUSCULAR; INTRAVENOUS; SUBCUTANEOUS EVERY 4 HOURS PRN
Status: CANCELLED | OUTPATIENT
Start: 2023-06-09

## 2023-06-09 RX ORDER — MIDAZOLAM HYDROCHLORIDE 1 MG/ML
2 INJECTION INTRAMUSCULAR; INTRAVENOUS ONCE
Status: COMPLETED | OUTPATIENT
Start: 2023-06-09 | End: 2023-06-09

## 2023-06-09 RX ORDER — OXYCODONE AND ACETAMINOPHEN 5; 325 MG/1; MG/1
1 TABLET ORAL EVERY 6 HOURS PRN
Qty: 20 TABLET | Refills: 0 | Status: SHIPPED | OUTPATIENT
Start: 2023-06-09 | End: 2023-08-09

## 2023-06-09 RX ORDER — DEXAMETHASONE SODIUM PHOSPHATE 4 MG/ML
INJECTION, SOLUTION INTRA-ARTICULAR; INTRALESIONAL; INTRAMUSCULAR; INTRAVENOUS; SOFT TISSUE
Status: DISCONTINUED | OUTPATIENT
Start: 2023-06-09 | End: 2023-06-09

## 2023-06-09 RX ORDER — HYDROMORPHONE HYDROCHLORIDE 2 MG/ML
INJECTION, SOLUTION INTRAMUSCULAR; INTRAVENOUS; SUBCUTANEOUS
Status: DISCONTINUED
Start: 2023-06-09 | End: 2023-06-09 | Stop reason: HOSPADM

## 2023-06-09 RX ORDER — OXYCODONE AND ACETAMINOPHEN 5; 325 MG/1; MG/1
1 TABLET ORAL EVERY 6 HOURS PRN
Status: DISCONTINUED | OUTPATIENT
Start: 2023-06-09 | End: 2023-06-09 | Stop reason: HOSPADM

## 2023-06-09 RX ORDER — ONDANSETRON 2 MG/ML
4 INJECTION INTRAMUSCULAR; INTRAVENOUS ONCE AS NEEDED
Status: DISCONTINUED | OUTPATIENT
Start: 2023-06-09 | End: 2023-06-09 | Stop reason: HOSPADM

## 2023-06-09 RX ORDER — DIPHENHYDRAMINE HYDROCHLORIDE 50 MG/ML
25 INJECTION INTRAMUSCULAR; INTRAVENOUS EVERY 4 HOURS PRN
Status: CANCELLED | OUTPATIENT
Start: 2023-06-09

## 2023-06-09 RX ORDER — METHOCARBAMOL 100 MG/ML
INJECTION, SOLUTION INTRAMUSCULAR; INTRAVENOUS
Status: COMPLETED
Start: 2023-06-09 | End: 2023-06-09

## 2023-06-09 RX ORDER — SILVER NITRATE 38.21; 12.74 MG/1; MG/1
STICK TOPICAL
Status: DISCONTINUED | OUTPATIENT
Start: 2023-06-09 | End: 2023-06-09 | Stop reason: HOSPADM

## 2023-06-09 RX ORDER — PROPOFOL 10 MG/ML
VIAL (ML) INTRAVENOUS
Status: DISCONTINUED | OUTPATIENT
Start: 2023-06-09 | End: 2023-06-09

## 2023-06-09 RX ORDER — ACETAMINOPHEN 325 MG/1
650 TABLET ORAL
Status: COMPLETED | OUTPATIENT
Start: 2023-06-09 | End: 2023-06-09

## 2023-06-09 RX ORDER — LIDOCAINE HYDROCHLORIDE 10 MG/ML
INJECTION, SOLUTION EPIDURAL; INFILTRATION; INTRACAUDAL; PERINEURAL
Status: DISCONTINUED | OUTPATIENT
Start: 2023-06-09 | End: 2023-06-09

## 2023-06-09 RX ORDER — FAMOTIDINE 20 MG/1
40 TABLET, FILM COATED ORAL ONCE
Status: COMPLETED | OUTPATIENT
Start: 2023-06-09 | End: 2023-06-09

## 2023-06-09 RX ORDER — HYDROMORPHONE HYDROCHLORIDE 2 MG/ML
0.4 INJECTION, SOLUTION INTRAMUSCULAR; INTRAVENOUS; SUBCUTANEOUS EVERY 5 MIN PRN
Status: DISCONTINUED | OUTPATIENT
Start: 2023-06-09 | End: 2023-06-09 | Stop reason: HOSPADM

## 2023-06-09 RX ORDER — ONDANSETRON 4 MG/1
8 TABLET, ORALLY DISINTEGRATING ORAL EVERY 8 HOURS PRN
Status: DISCONTINUED | OUTPATIENT
Start: 2023-06-09 | End: 2023-06-09 | Stop reason: HOSPADM

## 2023-06-09 RX ORDER — HYDROMORPHONE HYDROCHLORIDE 2 MG/ML
0.4 INJECTION, SOLUTION INTRAMUSCULAR; INTRAVENOUS; SUBCUTANEOUS EVERY 10 MIN PRN
Status: DISCONTINUED | OUTPATIENT
Start: 2023-06-09 | End: 2023-06-09

## 2023-06-09 RX ORDER — DIPHENHYDRAMINE HCL 25 MG
25 CAPSULE ORAL EVERY 4 HOURS PRN
Status: CANCELLED | OUTPATIENT
Start: 2023-06-09

## 2023-06-09 RX ORDER — SODIUM CHLORIDE, SODIUM LACTATE, POTASSIUM CHLORIDE, CALCIUM CHLORIDE 600; 310; 30; 20 MG/100ML; MG/100ML; MG/100ML; MG/100ML
INJECTION, SOLUTION INTRAVENOUS CONTINUOUS
Status: ACTIVE | OUTPATIENT
Start: 2023-06-09 | End: 2023-06-09

## 2023-06-09 RX ORDER — SODIUM CHLORIDE, SODIUM LACTATE, POTASSIUM CHLORIDE, CALCIUM CHLORIDE 600; 310; 30; 20 MG/100ML; MG/100ML; MG/100ML; MG/100ML
INJECTION, SOLUTION INTRAVENOUS CONTINUOUS
Status: DISCONTINUED | OUTPATIENT
Start: 2023-06-09 | End: 2023-06-09 | Stop reason: HOSPADM

## 2023-06-09 RX ADMIN — DEXAMETHASONE SODIUM PHOSPHATE 4 MG: 4 INJECTION, SOLUTION INTRA-ARTICULAR; INTRALESIONAL; INTRAMUSCULAR; INTRAVENOUS; SOFT TISSUE at 08:06

## 2023-06-09 RX ADMIN — SODIUM CHLORIDE, POTASSIUM CHLORIDE, SODIUM LACTATE AND CALCIUM CHLORIDE: 600; 310; 30; 20 INJECTION, SOLUTION INTRAVENOUS at 08:06

## 2023-06-09 RX ADMIN — FAMOTIDINE 40 MG: 20 TABLET, FILM COATED ORAL at 08:06

## 2023-06-09 RX ADMIN — MIDAZOLAM HYDROCHLORIDE 2 MG: 1 INJECTION, SOLUTION INTRAMUSCULAR; INTRAVENOUS at 08:06

## 2023-06-09 RX ADMIN — HYDROMORPHONE HYDROCHLORIDE 0.4 MG: 2 INJECTION, SOLUTION INTRAMUSCULAR; INTRAVENOUS; SUBCUTANEOUS at 09:06

## 2023-06-09 RX ADMIN — ACETAMINOPHEN 650 MG: 325 TABLET ORAL at 08:06

## 2023-06-09 RX ADMIN — METHOCARBAMOL 1000 MG: 100 INJECTION INTRAMUSCULAR; INTRAVENOUS at 09:06

## 2023-06-09 RX ADMIN — PROPOFOL 200 MG: 10 INJECTION, EMULSION INTRAVENOUS at 08:06

## 2023-06-09 RX ADMIN — CELECOXIB 400 MG: 200 CAPSULE ORAL at 08:06

## 2023-06-09 RX ADMIN — ONDANSETRON 4 MG: 2 INJECTION INTRAMUSCULAR; INTRAVENOUS at 08:06

## 2023-06-09 RX ADMIN — LIDOCAINE HYDROCHLORIDE 50 MG: 10 INJECTION, SOLUTION EPIDURAL; INFILTRATION; INTRACAUDAL; PERINEURAL at 08:06

## 2023-06-09 RX ADMIN — FENTANYL CITRATE 50 MCG: 50 INJECTION, SOLUTION INTRAMUSCULAR; INTRAVENOUS at 08:06

## 2023-06-09 NOTE — ANESTHESIA POSTPROCEDURE EVALUATION
Anesthesia Post Evaluation    Patient: Ida Saxena    Procedure(s) Performed: Procedure(s) (LRB):  DILATION AND CURETTAGE, UTERUS fx (N/A)    Final Anesthesia Type: general      Patient location during evaluation: PACU  Patient participation: Yes- Able to Participate  Level of consciousness: awake and alert, awake and oriented  Post-procedure vital signs: reviewed and stable  Pain management: adequate  Airway patency: patent    PONV status at discharge: No PONV  Anesthetic complications: no      Cardiovascular status: blood pressure returned to baseline, hemodynamically stable and stable  Respiratory status: unassisted, spontaneous ventilation and room air  Hydration status: euvolemic  Follow-up not needed.          Vitals Value Taken Time   /82 06/09/23 1120   Temp 36.9 °C (98.4 °F) 06/09/23 1005   Pulse 65 06/09/23 1120   Resp 18 06/09/23 1036   SpO2 96 % 06/09/23 1120         Event Time   Out of Recovery 10:04:00         Pain/Naina Score: Pain Rating Prior to Med Admin: 6 (6/9/2023  9:26 AM)  Naian Score: 10 (6/9/2023 10:05 AM)

## 2023-06-09 NOTE — TRANSFER OF CARE
"Anesthesia Transfer of Care Note    Patient: Ida Saxena    Procedure(s) Performed: Procedure(s) (LRB):  DILATION AND CURETTAGE, UTERUS fx (N/A)    Patient location: PACU    Anesthesia Type: general    Transport from OR: Transported from OR on room air with adequate spontaneous ventilation    Post pain: adequate analgesia    Post assessment: no apparent anesthetic complications    Post vital signs: stable    Level of consciousness: responds to stimulation    Nausea/Vomiting: no nausea/vomiting    Complications: none    Transfer of care protocol was followed      Last vitals:   Visit Vitals  BP (!) 138/91   Pulse 77   Temp 37.2 °C (99 °F) (Oral)   Ht 5' 6" (1.676 m)   Wt 115.5 kg (254 lb 10.1 oz)   LMP 03/13/2023 (Approximate)   SpO2 100%   Breastfeeding No   BMI 41.10 kg/m²     "

## 2023-06-09 NOTE — OP NOTE
[facility]                       [address] [address]    PATIENT NAME:      LOYD BARRERA  YOB: 1985  CSN:               [id number]  MRN:               [id number]  ADMIT DATE:        06/09/2023 06:53:00  PHYSICIAN:         Jose Canales Jr, MD                          OPERATIVE REPORT      DATE OF SURGERY:        SURGEON:  Jose Canales Jr, MD    PROCEDURE PERFORMED:  Fractional D and C.    PROCEDURE IN DETAIL:  The patient was taken to the operating room where general   anesthesia was administered and found to be adequate.  Abdomen, perineum, vagina   prepped and draped in usual sterile fashion.  A weighted speculum inserted to   the posterior vagina.  Anterior lip of the cervix was grasped with a   single-tooth tenaculum.  The cervix was progressively dilated with Alatorre   dilators.  A sharp curette was inserted into the uterus and scraped in a   circumferential fashion until gritty texture was obtained throughout.  The   scraping was continued.  Minimal tissue was retrieved.  After the initial   scrape, the tissue was collected on Telfa pad and sent to pathology for   identification.  The patient was observed under anesthesia.  She had no further   vaginal bleeding from the cervix.  The puncture sites were cauterized with a   silver nitrate stick.  The patient tolerated the procedure well.    COUNT:  Needle, sponge, lap counts were correct x2.    BLOOD LOSS:  40 mL.        ______________________________  Jose Canales Jr, MD    DJE/AQS  DD:  06/09/2023  Time:  09:03AM  DT:  06/09/2023  Time:  09:28AM  Job #:  [id number]      OPERATIVE REPORT

## 2023-06-09 NOTE — H&P
[facility]                       [address] [address]    PATIENT NAME:       LOYD BARRERA  YOB: 1985  CSN:                [id number]   MRN:                [id number]  ADMIT DATE:         2023 06:53:00  PHYSICIAN:          Jose Canales Jr, MD                        HISTORY AND PHYSICAL      HISTORY:  The patient is scheduled for a fractional D and C today.  The patient   is a 38-year-old female with heavy vaginal bleeding.  The patient reports that   she has had on and off heavy bleeding over the last 18 months.  She has been   hospitalized where a transfusion begun secondary to severe anemia.  The patient   has tried cyclic Provera.  She has tried OCP cascade with minimal relief.  She   desires to have definitive surgical therapy via a fractional D and C.  Explained   to the patient that this may not completely eliminate all of her symptoms of   heavy bleeding and it may return which could require further hormonal or   surgical therapy.  The patient is adamant on future fertility.  Explained more   definitive treatment options to the patient.  The patient politely declined.    States that her heart is set on having a baby in the future.  Her vitals on   admission were stable.  She was afebrile.    ALLERGIES:  NO KNOWN DRUG ALLERGIES.     CURRENT MEDICATIONS:  Hydrochlorothiazide, Provera, albuterol p.r.n., vitamin   D3, Flexeril p.r.n., and iron.    FAMILY HISTORY:  Significant for hypertension and diabetes.    PAST MEDICAL HISTORY:  Hypertension, migraines.    SURGICAL HISTORY:   x3.    PHYSICAL EXAMINATION:  VITAL SIGNS:  Her vitals were stable.  Blood pressure 139/82, respirations 18,   temp 98.5.  CARDIOVASCULAR:  S1, S2.  No murmurs.  LUNGS:  Clear.  ABDOMEN:  Soft, nontender.  EXTREMITIES:  Had trace lower extremity edema.  EXTERNAL GENITALIA:  Normal.  Cervix parous, no  lesions.  Uterus normal.  Adnexa   no masses were appreciated.  NEUROLOGICAL:  The patient was intact.    ASSESSMENT:  Abnormal uterine bleeding.    PLAN:  Fractional D and C today.        ______________________________  MD DANIELA Moreno Jr/YAMILEX  DD:  06/09/2023  Time:  07:54AM  DT:  06/09/2023  Time:  08:08AM  Job #:  [id number]      HISTORY AND PHYSICAL

## 2023-06-09 NOTE — DISCHARGE SUMMARY
[facility]                       [address] [address]    PATIENT NAME:       LOYD BARRERA  YOB: 1985  CSN:                [id number]   MRN:                [id number]  ADMIT DATE:         06/09/2023 06:53:00  PHYSICIAN:          Jose Canales Jr, MD                          DISCHARGE SUMMARY    DATE OF DISCHARGE:  06/09/2023 00:00:00    DIAGNOSIS:  Status post fractional D and C.    HOSPITAL COURSE:  The patient underwent the procedure without incident.  She was   admitted to the postoperative service, where all criteria were met.  She   ambulated, tolerated a regular diet.  Her vitals were stable, she is afebrile.    She had normal bowel and bladder function.  She was discharged home.    CONDITION ON DISCHARGE:  Stable.    DIET:  Regular.    ACTIVITY:  Pelvic rest.    MEDICATIONS:  Percocet p.r.n.  Motrin p.r.n.    FOLLOWUP:  Follow up with Dr. Canales in 3 weeks.        ______________________________  Jose Canales Jr, MD    DJE/AQS  DD:  06/09/2023  Time:  09:09AM  DT:  06/09/2023  Time:  09:42AM  Job #:  [id number]      DISCHARGE SUMMARY

## 2023-06-09 NOTE — ANESTHESIA PROCEDURE NOTES
Intubation    Date/Time: 6/9/2023 8:37 AM  Performed by: Charito Dias CRNA  Authorized by: Inez Purdy MD     Intubation:     Induction:  Intravenous    Intubated:  Postinduction    Mask Ventilation:  Easy mask    Attempts:  1    Attempted By:  CRNA    Difficult Airway Encountered?: No      Complications:  None    Airway Device:  Supraglottic airway/LMA    Airway Device Size:  4.0    Style/Cuff Inflation:  Cuffed (inflated to minimal occlusive pressure)    Secured at:  The lips    Placement Verified By:  Capnometry    Complicating Factors:  None    Findings Post-Intubation:  BS equal bilateral

## 2023-06-10 VITALS
OXYGEN SATURATION: 96 % | HEART RATE: 65 BPM | RESPIRATION RATE: 18 BRPM | BODY MASS INDEX: 40.92 KG/M2 | DIASTOLIC BLOOD PRESSURE: 82 MMHG | HEIGHT: 66 IN | WEIGHT: 254.63 LBS | SYSTOLIC BLOOD PRESSURE: 129 MMHG | TEMPERATURE: 98 F

## 2023-06-12 LAB — PSYCHE PATHOLOGY RESULT: NORMAL

## 2023-08-09 ENCOUNTER — OFFICE VISIT (OUTPATIENT)
Dept: INTERNAL MEDICINE | Facility: CLINIC | Age: 38
End: 2023-08-09
Payer: MEDICAID

## 2023-08-09 VITALS
BODY MASS INDEX: 40.85 KG/M2 | SYSTOLIC BLOOD PRESSURE: 111 MMHG | TEMPERATURE: 99 F | DIASTOLIC BLOOD PRESSURE: 75 MMHG | OXYGEN SATURATION: 100 % | RESPIRATION RATE: 18 BRPM | HEART RATE: 71 BPM | WEIGHT: 254.19 LBS | HEIGHT: 66 IN

## 2023-08-09 DIAGNOSIS — D64.9 ANEMIA, UNSPECIFIED TYPE: Primary | ICD-10-CM

## 2023-08-09 DIAGNOSIS — G43.109 MIGRAINE WITH AURA AND WITHOUT STATUS MIGRAINOSUS, NOT INTRACTABLE: ICD-10-CM

## 2023-08-09 DIAGNOSIS — R60.0 BILATERAL LEG EDEMA: ICD-10-CM

## 2023-08-09 DIAGNOSIS — N64.4 BREAST PAIN: Primary | ICD-10-CM

## 2023-08-09 DIAGNOSIS — I10 HYPERTENSION, UNSPECIFIED TYPE: ICD-10-CM

## 2023-08-09 DIAGNOSIS — I10 PRIMARY HYPERTENSION: Chronic | ICD-10-CM

## 2023-08-09 DIAGNOSIS — Z87.891 FORMER TOBACCO USE: Chronic | ICD-10-CM

## 2023-08-09 DIAGNOSIS — R42 DIZZINESS: ICD-10-CM

## 2023-08-09 DIAGNOSIS — E55.9 VITAMIN D DEFICIENCY: ICD-10-CM

## 2023-08-09 PROBLEM — E66.813 CLASS 3 SEVERE OBESITY DUE TO EXCESS CALORIES WITH SERIOUS COMORBIDITY AND BODY MASS INDEX (BMI) OF 40.0 TO 44.9 IN ADULT: Chronic | Status: ACTIVE | Noted: 2022-09-13

## 2023-08-09 PROBLEM — E66.01 CLASS 3 SEVERE OBESITY DUE TO EXCESS CALORIES WITH SERIOUS COMORBIDITY AND BODY MASS INDEX (BMI) OF 40.0 TO 44.9 IN ADULT: Chronic | Status: ACTIVE | Noted: 2022-09-13

## 2023-08-09 PROCEDURE — 99214 OFFICE O/P EST MOD 30 MIN: CPT | Mod: S$PBB,,, | Performed by: NURSE PRACTITIONER

## 2023-08-09 PROCEDURE — 99214 PR OFFICE/OUTPT VISIT, EST, LEVL IV, 30-39 MIN: ICD-10-PCS | Mod: S$PBB,,, | Performed by: NURSE PRACTITIONER

## 2023-08-09 PROCEDURE — 1159F MED LIST DOCD IN RCRD: CPT | Mod: CPTII,,, | Performed by: NURSE PRACTITIONER

## 2023-08-09 PROCEDURE — 3008F BODY MASS INDEX DOCD: CPT | Mod: CPTII,,, | Performed by: NURSE PRACTITIONER

## 2023-08-09 PROCEDURE — 3078F PR MOST RECENT DIASTOLIC BLOOD PRESSURE < 80 MM HG: ICD-10-PCS | Mod: CPTII,,, | Performed by: NURSE PRACTITIONER

## 2023-08-09 PROCEDURE — 3074F SYST BP LT 130 MM HG: CPT | Mod: CPTII,,, | Performed by: NURSE PRACTITIONER

## 2023-08-09 PROCEDURE — 1160F RVW MEDS BY RX/DR IN RCRD: CPT | Mod: CPTII,,, | Performed by: NURSE PRACTITIONER

## 2023-08-09 PROCEDURE — 3078F DIAST BP <80 MM HG: CPT | Mod: CPTII,,, | Performed by: NURSE PRACTITIONER

## 2023-08-09 PROCEDURE — 3008F PR BODY MASS INDEX (BMI) DOCUMENTED: ICD-10-PCS | Mod: CPTII,,, | Performed by: NURSE PRACTITIONER

## 2023-08-09 PROCEDURE — 3074F PR MOST RECENT SYSTOLIC BLOOD PRESSURE < 130 MM HG: ICD-10-PCS | Mod: CPTII,,, | Performed by: NURSE PRACTITIONER

## 2023-08-09 PROCEDURE — 1159F PR MEDICATION LIST DOCUMENTED IN MEDICAL RECORD: ICD-10-PCS | Mod: CPTII,,, | Performed by: NURSE PRACTITIONER

## 2023-08-09 PROCEDURE — 99214 OFFICE O/P EST MOD 30 MIN: CPT | Mod: PBBFAC | Performed by: NURSE PRACTITIONER

## 2023-08-09 PROCEDURE — 1160F PR REVIEW ALL MEDS BY PRESCRIBER/CLIN PHARMACIST DOCUMENTED: ICD-10-PCS | Mod: CPTII,,, | Performed by: NURSE PRACTITIONER

## 2023-08-09 RX ORDER — HYDROCHLOROTHIAZIDE 25 MG/1
25 TABLET ORAL DAILY
Qty: 30 TABLET | Refills: 6 | Status: SHIPPED | OUTPATIENT
Start: 2023-08-09 | End: 2023-10-02

## 2023-08-09 RX ORDER — SUMATRIPTAN 50 MG/1
50 TABLET, FILM COATED ORAL
Qty: 30 TABLET | Refills: 6 | Status: SHIPPED | OUTPATIENT
Start: 2023-08-09 | End: 2023-09-14

## 2023-08-09 NOTE — PROGRESS NOTES
Patient ID: [id number]     Chief Complaint: Establish Care (High blood pressure, tumor on pituitary gland, headaches, swelling, tenderness in both breasts)    HPI:     Ida Saxena is a 38 y.o. female with diagnosis of HTN, Anemia, Abnormal Uterine Bleeding, Venous Insufficiency BLE, Migraines. Patient seen in clinic today to establish care.   Patient previously followed by Nataly Sanders NP with [facility]. Last appointment on 05/17/2023.  Patient was referred to Neurosurgery in [facility] for pituitary lesion, MRI repeat, was negative. Prolactin normal, 20.1 on 08/08/2023. Patient has appointment scheduled with Endocrinology Clinic on 08/23/2023.   Patient had D&C performed on 06/09/2023 by Dr. Kwan Canales due to abnormal uterine bleeding, anemia. Prescribed Provera but didn't help. Patient states LMP 07/07/2023, last approx 2 days.   Patient was referred to Dr. Condon for Venous Insufficiency. Instructed to wear compression stockings. Patient states she hasn't been wearing them and still has swelling. Patient denies chest pain, SOB.  Patient currently prescribed HCTZ 25 mg daily for HTN. B/P today 111/75. Patient states she does not have a B/P monitor so unable to check B/P. Patient states she does not follow a low sodium diet.   Today, patient states tenderness to bilateral breast, states also feels a pricking sensation to bilateral breast. Patient had screening MMG in 10/2022, negative. Patient followed by GYN but states she did not schedule appt for complaint. Patient states breast pain x1 month.  Patient also states dizziness at times. Patient denies syncope. Patient unsure of B/P when dizziness occurs. Patient does have a history of migraines, has Neurology appt scheduled for 08/31/2023.  Patient denies any other acute complaints.     Patient followed by Neurosurgery Clinic, Dr. Michael Bridges in [facility]. Last appointment on 04/26/2023. 38-year-old female with history of increased menstrual  bleeding, Prolactin of 30 and possible 4mm pituitary microadenoma discovered as part of work-up. In light of patient's mildly elevated prolactin, increased menstrual bleeding (vs expected amenorrhea in prolactinoma), and absence of lactation, there is low suspicion for prolactinoma. Will repeat pituitary labs and discuss results with patient in 1 week. Repeat MRI brain and pituitary labs in 3 months. Discussed plan with patient and explained that her vaginal bleeding and headaches are unlikely to be related to her pituitary lesion. No follow up appointment noted.     Review of patient's allergies indicates:  No Known Allergies    Breast Cancer Screening: MMG negative on 10/27/2022  Cervical Cancer Screenin2023  Colorectal Cancer Screening: deferred due to age  Diabetic Eye Exam: N/A  Diabetic Foot Exam: N/A  Lung Cancer Screening: N/A  Prostate Cancer Screening: N/A  AAA Screening: N/A  Osteoporosis Screening: deferred due to age  Medicare Wellness: N/A  Immunizations:   Immunization History   Administered Date(s) Administered    DTP 1985, 1985, 1986, 1986, 1990    HIB 1990    MMR 1986    PPD Test 2001    Poliovirus 1985, 1985, 1986, 1990    Td - PF (ADULT) 1998     Past Surgical History:   Procedure Laterality Date     SECTION      x 3    DILATION AND CURETTAGE OF UTERUS N/A 2023    Procedure: DILATION AND CURETTAGE, UTERUS fx;  Surgeon: Jose Canales Jr., MD;  Location: [facility];  Service: OB/GYN;  Laterality: N/A;     family history includes Cancer in her maternal grandmother; Heart murmur in her mother; Hypothyroidism in her mother.    Social History     Socioeconomic History    Marital status: Single   Tobacco Use    Smoking status: Former     Current packs/day: 0.00     Types: Cigarettes, Vaping with nicotine     Quit date: 2021     Years since quittin.2    Smokeless tobacco: Never   Substance and Sexual  "Activity    Alcohol use: Yes     Alcohol/week: 2.0 standard drinks of alcohol     Types: 2 Glasses of wine per week     Comment: once/week    Drug use: Never    Sexual activity: Yes     Current Outpatient Medications   Medication Instructions    cyclobenzaprine (FLEXERIL) 5 MG tablet TAKE 1 TABLET BY MOUTH THREE TIMES DAILY FOR UP TO 15 DOSES AS NEEDED FOR MUSCLE SPASMS    hydroCHLOROthiazide (HYDRODIURIL) 25 mg, Oral, Daily    medroxyPROGESTERone (PROVERA) 10 mg, Oral    sumatriptan (IMITREX) 50 mg, Oral, Every 6-8 hours PRN       Subjective:     Review of Systems   Constitutional: Negative.    HENT: Negative.     Eyes: Negative.    Respiratory: Negative.     Cardiovascular:  Positive for leg swelling.   Gastrointestinal: Negative.    Endocrine: Negative.    Genitourinary: Negative.    Musculoskeletal: Negative.    Skin: Negative.    Allergic/Immunologic: Negative.    Neurological: Negative.    Hematological: Negative.    Psychiatric/Behavioral: Negative.         Objective:     Visit Vitals  /75 (BP Location: Left arm, Patient Position: Sitting, BP Method: X-Large (Automatic))   Pulse 71   Temp 98.9 °F (37.2 °C) (Oral)   Resp 18   Ht 5' 6" (1.676 m)   Wt 115.3 kg (254 lb 3.2 oz)   LMP 07/07/2023 (Approximate)   SpO2 100%   BMI 41.03 kg/m²       Physical Exam  Vitals reviewed.   Constitutional:       Appearance: Normal appearance.   HENT:      Head: Normocephalic and atraumatic.      Mouth/Throat:      Mouth: Mucous membranes are moist.      Pharynx: Oropharynx is clear.   Eyes:      Extraocular Movements: Extraocular movements intact.      Conjunctiva/sclera: Conjunctivae normal.      Pupils: Pupils are equal, round, and reactive to light.   Cardiovascular:      Rate and Rhythm: Normal rate and regular rhythm.      Heart sounds: Normal heart sounds.      Comments: Mild edema noted to bilateral ankles and feet  Pulmonary:      Effort: Pulmonary effort is normal.      Breath sounds: Normal breath sounds. "   Abdominal:      General: Bowel sounds are normal.   Musculoskeletal:         General: Normal range of motion.      Cervical back: Normal range of motion.   Skin:     General: Skin is warm and dry.   Neurological:      Mental Status: She is alert and oriented to person, place, and time.   Psychiatric:         Mood and Affect: Mood normal.         Behavior: Behavior normal.         Labs Reviewed:     Hematology:  Lab Results   Component Value Date    WBC 4.84 08/09/2023    HGB 9.0 (L) 08/09/2023    HCT 33.5 (L) 08/09/2023     08/09/2023     Chemistry:  Lab Results   Component Value Date     08/09/2023    K 4.2 08/09/2023    CHLORIDE 109 (H) 08/09/2023    BUN 10.3 08/09/2023    CREATININE 0.73 08/09/2023    EGFRNORACEVR >60 08/09/2023    GLUCOSE 95 08/09/2023    CALCIUM 9.3 08/09/2023    ALKPHOS 63 08/09/2023    LABPROT 7.3 08/09/2023    LABPROT 13.6 05/30/2023    ALBUMIN 3.7 08/09/2023    BILIDIR 0.2 10/16/2021    IBILI 0.10 10/16/2021    AST 18 08/09/2023    ALT 13 08/09/2023    MG 2.00 11/10/2022    [id number] 28.9 (L) 08/09/2023     Lab Results   Component Value Date    HGBA1C 5.1 09/13/2022     Lipid Panel:  Lab Results   Component Value Date    CHOL 124 05/18/2023    HDL 32 (L) 05/18/2023    LDL 79.00 05/18/2023    TRIG 64 05/18/2023    TOTALCHOLEST 4 05/18/2023     Thyroid:  Lab Results   Component Value Date    TSH 0.6313 09/13/2022     Urine:  Lab Results   Component Value Date    COLORUA Light-Yellow 10/28/2022    APPEARANCEUA Turbid (A) 10/28/2022    SGUA 1.020 10/28/2022    PHUA 6.0 10/28/2022    PROTEINUA Negative 10/28/2022    GLUCOSEUA Normal 10/28/2022    KETONESUA Negative 10/28/2022    BLOODUA Trace (A) 10/28/2022    NITRITESUA Negative 10/28/2022    LEUKOCYTESUR Negative 10/28/2022    RBCUA 0-5 10/28/2022    WBCUA 0-5 10/28/2022    BACTERIA Trace (A) 10/28/2022    SQEPUA Moderate (A) 10/28/2022    HYALINECASTS None Seen 10/28/2022        Assessment:       ICD-10-CM ICD-9-CM   1.  Breast pain  N64.4 611.71   2. Primary hypertension  I10 401.9   3. Bilateral leg edema  R60.0 782.3   4. Dizziness  R42 780.4   5. Migraine with aura and without status migrainosus, not intractable  G43.109 346.00   6. Vitamin D deficiency  E55.9 268.9   7. Former tobacco use  Z87.891 V15.82   8. Hypertension, unspecified type  I10 401.9        Plan:     1. Breast pain  - Mammo Digital Diagnostic Bilat with Shawn; Future  - US Breast Bilateral Limited; Future    2. Primary hypertension  Vitals:    08/09/23 0943   BP: 111/75   Pulse: 71   Resp: 18   Temp: 98.9 °F (37.2 °C)      No results found for this or any previous visit.  Results for orders placed or performed during the hospital encounter of 11/10/22   EKG 12-lead    Collection Time: 11/10/22  4:23 AM    Narrative    Test Reason : R07.9,    Vent. Rate : 074 BPM     Atrial Rate : 074 BPM     P-R Int : 188 ms          QRS Dur : 088 ms      QT Int : 452 ms       P-R-T Axes : 076 061 020 degrees     QTc Int : 501 ms    Normal sinus rhythm  Prolonged QT  Abnormal ECG  No previous ECGs available  Confirmed by Everton Paige MD ([id number]) on 11/10/2022 2:53:46 PM    Referred By: PEEWEE   SELF           Confirmed By:Everton Paige MD   Continue HCTZ 25 mg daily  DASH diet  Encouraged home blood pressure monitoring  - CBC Auto Differential; Future  - Comprehensive Metabolic Panel; Future    3. Bilateral leg edema  Patient diagnosed with venous insufficiency, instructed to Dr. Condon to wear compression stockings    4. Dizziness  B/P log given to patient    5. Migraine with aura and without status migrainosus, not intractable  Continue Imitrex prn  Neurology appt scheduled for 08/31/2023  - sumatriptan (IMITREX) 50 MG tablet; Take 1 tablet (50 mg total) by mouth every 6 to 8 hours as needed for Migraine.  Dispense: 30 tablet; Refill: 6    6. Vitamin D deficiency  Vitamin D level ordered  - Vitamin D; Future    7. Former tobacco use  Quit 8 months ago      Follow up in  about 3 months (around 11/9/2023) for Labs. In addition to their scheduled follow up, the patient has also been instructed to follow up on as needed basis.     Mary Shields, SHAMIRP

## 2023-08-09 NOTE — LETTER
August 9, 2023    Ida Saxena  [address]  [facility] [address]             [facility]  [address]  [facility] [address]  Phone: [phone] Patient seen in clinic today and does have diagnosis of Venous Congestion to Bilateral Lower Extremities.     Please reach out if you have any questions or concerns.       CAIN Velasquez

## 2023-08-09 NOTE — LETTER
August 9, 2023      [facility]  On license of [facility] [facility] [address]  Phone: [phone]       Patient: Ida Saxena   YOB: 1985  Date of Visit: 08/09/2023    To Whom It May Concern:    Danial Saxena  was at [facility] on 08/09/2023. She may return to work/school on 08/10/2023 with no restrictions. If you have any questions or concerns, or if I can be of further assistance, please do not hesitate to contact me.    Sincerely,    CAIN Batista

## 2023-09-12 NOTE — PROGRESS NOTES
[facility] Neurology Initial Office Visit Note    Initial Visit Date: 9/14/2023  Current Visit Date:  09/14/2023    Chief Complaint:     Chief Complaint   Patient presents with    Patient presents today with a hx of frequent migraines     Patient states that she has a migraine every day, medication only helps for a few hours       History of Present Illness:      This is 38 y.o. female with history of obesity, pituitary microadenoma, hypertension, who is referred headache disorder.    Age of Onset : 17 years old     Headache Description:   bilateral occipital, temporal, stabbing, severe, impeding day to day activity, lasting 24 hours, without nausea, with photophobia and phonophobia  Bitemporal, stabbing, mild to moderate, not impeding day to day activity, lasting 24 hours, without nausea, without photophobia and phonophobia    Frequency: daily headaches with 8 migraine headache days per month since age of 17     Provocation Factors: dietary    Risk Factors  - Family history of headache disorder: Yes maternal grandmother, mother, and daughter with headache  - History of focal CNS lesions: Yes pituitary microadenoma.  - History of CNS infections: No  - History head trauma: No  - History of underlying mood disorder: Yes mood swings   - History of sleep disorder: Yes frequent night awakening. Wake up tired. Dozes off while driving. Dozes off while watching TV. + Snores. Gained weight. Never had sleep study done.   - Recreational drug use: No  - Tobacco use: Yes 0.5 ppd. Quit 8 months ago.   - Alcohol use: Yes occasional  - Weight fluctuation: Yes obesity. Gaining weight.  - Isotretinoin or Tetracycline use:  No  - Family planning and contraceptive use: Actively trying to conceive.    Medications:     Current Prophylactic  Hydrochlorothiazide 25 mg daily: would forget to take it once to twice a week.     Current Abortive  Sumatriptan 50 mg twice a day as needed (3/28/2023 to present): ineffective.  BC powder: daily for the  "past "few years." Takes 2 packet per day.   Tylenol PRN: every other day for the past few years.   Ibuprofen PRN: every other day for the past few years.     Prior Prophylactic  Denied     Prior Abortive  As above.     Devices:     - VNS:  - TNS  - TMS:     Procedures:     - Botox:  - PSG block:   - Occipital nerve block:     Labs:     Results for orders placed or performed in visit on 08/09/23   Comprehensive Metabolic Panel   Result Value Ref Range    Sodium Level 140 136 - 145 mmol/L    Potassium Level 4.2 3.5 - 5.1 mmol/L    Chloride 109 (H) 98 - 107 mmol/L    Carbon Dioxide 26 22 - 29 mmol/L    Glucose Level 95 74 - 100 mg/dL    Blood Urea Nitrogen 10.3 7.0 - 18.7 mg/dL    Creatinine 0.73 0.55 - 1.02 mg/dL    Calcium Level Total 9.3 8.4 - 10.2 mg/dL    Protein Total 7.3 6.4 - 8.3 gm/dL    Albumin Level 3.7 3.5 - 5.0 g/dL    Globulin 3.6 (H) 2.4 - 3.5 gm/dL    Albumin/Globulin Ratio 1.0 (L) 1.1 - 2.0 ratio    Bilirubin Total 0.3 <=1.5 mg/dL    Alkaline Phosphatase 63 40 - 150 unit/L    Alanine Aminotransferase 13 0 - 55 unit/L    Aspartate Aminotransferase 18 5 - 34 unit/L    eGFR >60 mls/min/1.73/m2   Vitamin D   Result Value Ref Range    Vit D 25 OH 28.9 (L) 30.0 - 80.0 ng/mL   CBC with Differential   Result Value Ref Range    WBC 4.84 4.50 - 11.50 x10(3)/mcL    RBC 4.66 4.20 - 5.40 x10(6)/mcL    Hgb 9.0 (L) 12.0 - 16.0 g/dL    Hct 33.5 (L) 37.0 - 47.0 %    MCV 71.9 (L) 80.0 - 94.0 fL    MCH 19.3 (L) 27.0 - 31.0 pg    MCHC 26.9 (L) 33.0 - 36.0 g/dL    RDW 18.4 (H) 11.5 - 17.0 %    Platelet 319 130 - 400 x10(3)/mcL    MPV 9.1 7.4 - 10.4 fL    Neut % 50.7 %    Lymph % 34.5 %    Mono % 9.9 %    Eos % 3.9 %    Basophil % 0.6 %    Lymph # 1.67 0.6 - 4.6 x10(3)/mcL    Neut # 2.45 2.1 - 9.2 x10(3)/mcL    Mono # 0.48 0.1 - 1.3 x10(3)/mcL    Eos # 0.19 0 - 0.9 x10(3)/mcL    Baso # 0.03 <=0.2 x10(3)/mcL    IG# 0.02 0 - 0.04 x10(3)/mcL    IG% 0.4 %    NRBC% 0.0 %       Studies:     - [place] pituitary with and without " contrast [place] [facility] [place], unchanged hypoenhancing lesion in the left posterior aspect of the pituitary gland measuring 4 x 3 x 4 mm (image 7, series 13; image 8, series 16), grossly unchanged in size given differences in technique.    - MRI Brain +/- Amol 11/22/2022:  I have reviewed the study independently and with the patient. Metallic artifact. Subtle posterior globe flattening OU.   - MRA Head w/o Amol:   - MRV Head w/o Amol:   - NCHCT:  - Lumbar Puncture:    Review of Systems:     Review of Systems   All other systems reviewed and are negative.      Physical Exams:     Vitals:    09/14/23 0803   BP: 124/86   Pulse: 75   Temp: 98 °F (36.7 °C)       Physical Exam  Vitals and nursing note reviewed.   Constitutional:       Appearance: Normal appearance.   HENT:      Head: Normocephalic and atraumatic.      Comments: Hagen III     Nose: Nose normal.      Mouth/Throat:      Mouth: Mucous membranes are moist.      Pharynx: Oropharynx is clear.   Eyes:      Conjunctiva/sclera: Conjunctivae normal.   Cardiovascular:      Rate and Rhythm: Normal rate and regular rhythm.      Pulses: Normal pulses.   Pulmonary:      Effort: Pulmonary effort is normal.      Breath sounds: Normal breath sounds.   Abdominal:      General: Abdomen is flat.   Musculoskeletal:         General: Normal range of motion.      Cervical back: Normal range of motion.   Skin:     General: Skin is warm.   Neurological:      Mental Status: She is alert.         Comprehensive Neurological Exam:  Mental Status: Alert Oriented to Self, Date, and Place. Comprehension wnl. No dysarthria.   CN II - XII: EZRA, No APD, Fundus Grade I Papilledema  VFFC, No ptosis OU, EOMI without nystagmus LT/Temp symmetric in CN V1-3 distribution, Hearing grossly intact, Face Symmetric, Tongue and Uvula midline, Trapezius symmetric bilateral.   Motor: tone and bulk wnl throughout, no abnormal involuntary or voluntary movements, 5/5 to confrontation, Fine finger  movements wnl b/l, No pronator drift.   Sensory: LT, Proprioception, Vibration, PP, Temp symmetric.  Reflexes: 2+ throughout  Cerebellar: FNF wnl b/l, RAHM wnl b/l  Romberg: Negative  Gait: normal, toe gait within normal limits, tandem gait wnl.     Assessment:     This is 38 y.o. female with history of obesity, pituitary microadenoma, hypertension, who is referred headache with papilledema along medication overuse headache. Will need to rule out CVST, DESTINY.    Problem List Items Addressed This Visit          Endocrine    Class 3 severe obesity due to excess calories with serious comorbidity and body mass index (BMI) of 40.0 to 44.9 in adult - Primary (Chronic)     Other Visit Diagnoses       Papilledema of both eyes                Plan:     [] stop Sumatriptan 50 mg twice a day as needed  [] decrease BC powder use by 1 packet per week   [] stop Tylenol and Ibuprofen PRN   [] start Topiramate 50 mg at bedtime for 2 weeks then 100 mg at bedtime thereafter   [] MRV Brain +/- Amol   [] IR LP with opening pressure [facility]   [] Referral to sleep center for PSG   [] CBC, CMP, INR, Beta HCG today   [] weight loss highly advised at this point in time given IIH/DESTINY are comorbid conditions related to weight gain. Patient advised to follow up with PCP to pursue bariatric surgery evaluation or weight loss medications. Would recommend losing around 10% of baseline weight at this point in time.     RTC 2 Months via Telemedicine     Headache education provided: good sleep hygiene and 7 hours of sleep per night, stress management, medication overuse education provided. Using more 3 OTC per week may worsen headaches, high intensity interval training has shown to reduce headache frequency. Low carb, high protein has shown to reduce headache frequency. Patient is instructed in keep headache diary.     I have explained the treatment plan, diagnosis, and prognosis to patient. All questions are answered to the best of my knowledge.      Face to face time 60 minutes, including documentation, chart review, counseling, education, review of test results, relevant medical records, and coordination of care.       Liane Salas MD   General Neurology  09/12/2023

## 2023-09-14 ENCOUNTER — OFFICE VISIT (OUTPATIENT)
Dept: NEUROLOGY | Facility: CLINIC | Age: 38
End: 2023-09-14
Payer: MEDICAID

## 2023-09-14 VITALS
HEIGHT: 66 IN | DIASTOLIC BLOOD PRESSURE: 86 MMHG | HEART RATE: 75 BPM | TEMPERATURE: 98 F | OXYGEN SATURATION: 98 % | BODY MASS INDEX: 40.95 KG/M2 | SYSTOLIC BLOOD PRESSURE: 124 MMHG | WEIGHT: 254.81 LBS

## 2023-09-14 DIAGNOSIS — G44.59 OTHER COMPLICATED HEADACHE SYNDROME: ICD-10-CM

## 2023-09-14 DIAGNOSIS — E66.01 CLASS 3 SEVERE OBESITY DUE TO EXCESS CALORIES WITH SERIOUS COMORBIDITY AND BODY MASS INDEX (BMI) OF 40.0 TO 44.9 IN ADULT: Chronic | ICD-10-CM

## 2023-09-14 DIAGNOSIS — G44.40 MEDICATION OVERUSE HEADACHE: ICD-10-CM

## 2023-09-14 DIAGNOSIS — R53.83 FATIGUE, UNSPECIFIED TYPE: ICD-10-CM

## 2023-09-14 DIAGNOSIS — H47.10 PAPILLEDEMA OF BOTH EYES: Primary | ICD-10-CM

## 2023-09-14 PROBLEM — H51.9: Status: ACTIVE | Noted: 2023-09-14

## 2023-09-14 PROCEDURE — 3079F PR MOST RECENT DIASTOLIC BLOOD PRESSURE 80-89 MM HG: ICD-10-PCS | Mod: CPTII,,, | Performed by: PSYCHIATRY & NEUROLOGY

## 2023-09-14 PROCEDURE — 99205 PR OFFICE/OUTPT VISIT, NEW, LEVL V, 60-74 MIN: ICD-10-PCS | Mod: S$PBB,,, | Performed by: PSYCHIATRY & NEUROLOGY

## 2023-09-14 PROCEDURE — 3074F PR MOST RECENT SYSTOLIC BLOOD PRESSURE < 130 MM HG: ICD-10-PCS | Mod: CPTII,,, | Performed by: PSYCHIATRY & NEUROLOGY

## 2023-09-14 PROCEDURE — 99205 OFFICE O/P NEW HI 60 MIN: CPT | Mod: S$PBB,,, | Performed by: PSYCHIATRY & NEUROLOGY

## 2023-09-14 PROCEDURE — 1159F PR MEDICATION LIST DOCUMENTED IN MEDICAL RECORD: ICD-10-PCS | Mod: CPTII,,, | Performed by: PSYCHIATRY & NEUROLOGY

## 2023-09-14 PROCEDURE — 3074F SYST BP LT 130 MM HG: CPT | Mod: CPTII,,, | Performed by: PSYCHIATRY & NEUROLOGY

## 2023-09-14 PROCEDURE — 3008F PR BODY MASS INDEX (BMI) DOCUMENTED: ICD-10-PCS | Mod: CPTII,,, | Performed by: PSYCHIATRY & NEUROLOGY

## 2023-09-14 PROCEDURE — 3079F DIAST BP 80-89 MM HG: CPT | Mod: CPTII,,, | Performed by: PSYCHIATRY & NEUROLOGY

## 2023-09-14 PROCEDURE — 1159F MED LIST DOCD IN RCRD: CPT | Mod: CPTII,,, | Performed by: PSYCHIATRY & NEUROLOGY

## 2023-09-14 PROCEDURE — 3008F BODY MASS INDEX DOCD: CPT | Mod: CPTII,,, | Performed by: PSYCHIATRY & NEUROLOGY

## 2023-09-14 PROCEDURE — 99214 OFFICE O/P EST MOD 30 MIN: CPT | Mod: PBBFAC | Performed by: PSYCHIATRY & NEUROLOGY

## 2023-09-14 RX ORDER — TOPIRAMATE 50 MG/1
TABLET, FILM COATED ORAL
Qty: 42 TABLET | Refills: 0 | Status: SHIPPED | OUTPATIENT
Start: 2023-09-14 | End: 2023-10-02

## 2023-09-14 RX ORDER — TOPIRAMATE 100 MG/1
100 TABLET, FILM COATED ORAL 2 TIMES DAILY
Qty: 60 TABLET | Refills: 3 | Status: SHIPPED | OUTPATIENT
Start: 2023-10-12 | End: 2023-10-02

## 2023-09-14 RX ORDER — ACETAMINOPHEN 325 MG/1
325 TABLET ORAL EVERY 6 HOURS PRN
COMMUNITY
End: 2023-09-14

## 2023-09-18 DIAGNOSIS — G44.59 OTHER COMPLICATED HEADACHE SYNDROME: Primary | ICD-10-CM

## 2023-09-18 DIAGNOSIS — H47.11 PAPILLEDEMA ASSOCIATED WITH INCREASED INTRACRANIAL PRESSURE: Primary | ICD-10-CM

## 2023-09-19 ENCOUNTER — OFFICE VISIT (OUTPATIENT)
Dept: INTERNAL MEDICINE | Facility: CLINIC | Age: 38
End: 2023-09-19
Payer: MEDICAID

## 2023-09-19 VITALS
TEMPERATURE: 99 F | WEIGHT: 252 LBS | BODY MASS INDEX: 40.5 KG/M2 | HEIGHT: 66 IN | RESPIRATION RATE: 18 BRPM | DIASTOLIC BLOOD PRESSURE: 82 MMHG | SYSTOLIC BLOOD PRESSURE: 114 MMHG | HEART RATE: 82 BPM

## 2023-09-19 DIAGNOSIS — E66.01 CLASS 3 SEVERE OBESITY DUE TO EXCESS CALORIES WITH SERIOUS COMORBIDITY AND BODY MASS INDEX (BMI) OF 40.0 TO 44.9 IN ADULT: Chronic | ICD-10-CM

## 2023-09-19 DIAGNOSIS — I10 PRIMARY HYPERTENSION: Primary | Chronic | ICD-10-CM

## 2023-09-19 DIAGNOSIS — D64.9 ANEMIA, UNSPECIFIED TYPE: ICD-10-CM

## 2023-09-19 PROCEDURE — 1159F PR MEDICATION LIST DOCUMENTED IN MEDICAL RECORD: ICD-10-PCS | Mod: CPTII,,, | Performed by: NURSE PRACTITIONER

## 2023-09-19 PROCEDURE — 99215 OFFICE O/P EST HI 40 MIN: CPT | Mod: PBBFAC | Performed by: NURSE PRACTITIONER

## 2023-09-19 PROCEDURE — 1160F PR REVIEW ALL MEDS BY PRESCRIBER/CLIN PHARMACIST DOCUMENTED: ICD-10-PCS | Mod: CPTII,,, | Performed by: NURSE PRACTITIONER

## 2023-09-19 PROCEDURE — 1160F RVW MEDS BY RX/DR IN RCRD: CPT | Mod: CPTII,,, | Performed by: NURSE PRACTITIONER

## 2023-09-19 PROCEDURE — 3008F PR BODY MASS INDEX (BMI) DOCUMENTED: ICD-10-PCS | Mod: CPTII,,, | Performed by: NURSE PRACTITIONER

## 2023-09-19 PROCEDURE — 3079F PR MOST RECENT DIASTOLIC BLOOD PRESSURE 80-89 MM HG: ICD-10-PCS | Mod: CPTII,,, | Performed by: NURSE PRACTITIONER

## 2023-09-19 PROCEDURE — 3008F BODY MASS INDEX DOCD: CPT | Mod: CPTII,,, | Performed by: NURSE PRACTITIONER

## 2023-09-19 PROCEDURE — 99213 PR OFFICE/OUTPT VISIT, EST, LEVL III, 20-29 MIN: ICD-10-PCS | Mod: S$PBB,,, | Performed by: NURSE PRACTITIONER

## 2023-09-19 PROCEDURE — 1159F MED LIST DOCD IN RCRD: CPT | Mod: CPTII,,, | Performed by: NURSE PRACTITIONER

## 2023-09-19 PROCEDURE — 3079F DIAST BP 80-89 MM HG: CPT | Mod: CPTII,,, | Performed by: NURSE PRACTITIONER

## 2023-09-19 PROCEDURE — 3074F PR MOST RECENT SYSTOLIC BLOOD PRESSURE < 130 MM HG: ICD-10-PCS | Mod: CPTII,,, | Performed by: NURSE PRACTITIONER

## 2023-09-19 PROCEDURE — 3074F SYST BP LT 130 MM HG: CPT | Mod: CPTII,,, | Performed by: NURSE PRACTITIONER

## 2023-09-19 PROCEDURE — 99213 OFFICE O/P EST LOW 20 MIN: CPT | Mod: S$PBB,,, | Performed by: NURSE PRACTITIONER

## 2023-09-19 RX ORDER — FERROUS GLUCONATE 324(38)MG
324 TABLET ORAL
Qty: 90 TABLET | Refills: 2 | Status: SHIPPED | OUTPATIENT
Start: 2023-09-19 | End: 2024-02-16

## 2023-09-19 NOTE — PROGRESS NOTES
Patient ID: [id number]     Chief Complaint: Obesity (1.States Dr. Salas  suggest weight loss medication .2. Want to know about the sleeve) and Headache    HPI:     Ida Saxena is a 38 y.o. female with diagnosis of HTN, Anemia, Abnormal Uterine Bleeding, Venous Insufficiency BLE, Migraines. Patient seen in clinic today for weight loss referral. Patient last seen in clinic on 08/09/2023.   Patient states she was referred to Bariatric Surgery clinic in 01/2023, was approved for sleeve but had a lot going on and was unable to have surgery at that time. Patient requesting new referral for possible gastric sleeve.   Patient recently started on Topamax for headaches, states medication made her confused/dizzy so she stopped taking medication.   Patient was referred to Dr. Condon for Venous Insufficiency. Instructed to wear compression stockings. Patient states she hasn't been wearing them and still has swelling. Patient denies chest pain, SOB.  Patient currently prescribed HCTZ 25 mg daily for HTN. B/P today 111/75. Patient states she does not have a B/P monitor so unable to check B/P. Patient states she does not follow a low sodium diet.   At previous appt, patient stated tenderness to bilateral breast, described as pricking sensation. Patient had screening MMG in 10/2022, negative. Diagnostic MMG ordered, scheduled for 09/21/2023.   Patient denies any other acute complaints.     Patient followed by Neurology Clinic. Last appointment on 09/14/2023. This is 38 y.o. female with history of obesity, pituitary microadenoma, hypertension, who is referred headache with papilledema along medication overuse headache. Will need to rule out CVST, DESTINY. Papilledema of both eyes:  stop Sumatriptan 50 mg twice a day as needed. decrease BC powder use by 1 packet per week. stop Tylenol and Ibuprofen PRN. start Topiramate 50 mg at bedtime for 2 weeks then 100 mg at bedtime thereafter. MRV Brain +/- Amol. IR LP with opening pressure at  [facility]. Referral to sleep center for PSG. CBC, CMP, INR, Beta HCG today. weight loss highly advised at this point in time given IIH/DESTINY are comorbid conditions related to weight gain. Patient advised to follow up with PCP to pursue bariatric surgery evaluation or weight loss medications. Would recommend losing around 10% of baseline weight at this point in time. Patient has follow up appointment scheduled for 2023.     Patient followed by Neurosurgery Clinic, Dr. Michael Bridges in [facility]. Last appointment on 2023. 38 y.o.female nonfunctioning pituitary microadenoma. Follow up in clinic in 3 months with MRI pituitary gland. Patient has follow up appointment scheduled for 2024.     Review of patient's allergies indicates:  No Known Allergies    Breast Cancer Screening: MMG negative on 10/27/2022  Cervical Cancer Screenin2023  Colorectal Cancer Screening: deferred due to age  Diabetic Eye Exam: N/A  Diabetic Foot Exam: N/A  Lung Cancer Screening: N/A  Prostate Cancer Screening: N/A  AAA Screening: N/A  Osteoporosis Screening: deferred due to age  Medicare Wellness: N/A  Immunizations:   Immunization History   Administered Date(s) Administered    DTP 1985, 1985, 1986, 1986, 1990    HIB 1990    MMR 1986    PPD Test 2001    Poliovirus 1985, 1985, 1986, 1990    Td - PF (ADULT) 1998     Past Surgical History:   Procedure Laterality Date     SECTION      x 3    DILATION AND CURETTAGE OF UTERUS N/A 2023    Procedure: DILATION AND CURETTAGE, UTERUS fx;  Surgeon: Jose Canales Jr., MD;  Location: [facility] OR;  Service: OB/GYN;  Laterality: N/A;     family history includes Cancer in her maternal grandmother; Heart murmur in her mother; Hypothyroidism in her mother.    Social History     Socioeconomic History    Marital status: Single   Tobacco Use    Smoking status: Former     Current packs/day: 0.00     Types:  "Cigarettes, Vaping with nicotine     Quit date: 2021     Years since quittin.3    Smokeless tobacco: Never   Substance and Sexual Activity    Alcohol use: Yes     Alcohol/week: 2.0 standard drinks of alcohol     Types: 2 Glasses of wine per week     Comment: occassionally    Drug use: Never    Sexual activity: Yes     Partners: Male     Current Outpatient Medications   Medication Instructions    aspirin/salicylamide/caffeine (BC HEADACHE POWDER ORAL) Oral, Daily    ferrous gluconate (FERGON) 324 mg, Oral, With breakfast    hydroCHLOROthiazide (HYDRODIURIL) 25 mg, Oral, Daily    topiramate (TOPAMAX) 50 MG tablet Take 1 tablet (50 mg total) by mouth every evening for 14 days, THEN 2 tablets (100 mg total) every evening for 14 days.    [START ON 10/12/2023] topiramate (TOPAMAX) 100 mg, Oral, 2 times daily       Subjective:     Review of Systems   Constitutional: Negative.    HENT: Negative.     Eyes: Negative.    Respiratory: Negative.     Cardiovascular: Negative.    Gastrointestinal: Negative.    Endocrine: Negative.    Genitourinary: Negative.    Musculoskeletal: Negative.    Skin: Negative.    Allergic/Immunologic: Negative.    Neurological: Negative.    Hematological: Negative.    Psychiatric/Behavioral: Negative.         Objective:     Visit Vitals  /82 (BP Location: Left arm, Patient Position: Sitting, BP Method: Large (Automatic))   Pulse 82   Temp 98.6 °F (37 °C) (Oral)   Resp 18   Ht 5' 5.98" (1.676 m)   Wt 114.3 kg (252 lb)   LMP 2023 (Exact Date)   BMI 40.69 kg/m²         Physical Exam  Vitals reviewed.   Constitutional:       Appearance: Normal appearance. She is obese.   HENT:      Head: Normocephalic and atraumatic.      Mouth/Throat:      Mouth: Mucous membranes are moist.      Pharynx: Oropharynx is clear.   Eyes:      Extraocular Movements: Extraocular movements intact.      Conjunctiva/sclera: Conjunctivae normal.      Pupils: Pupils are equal, round, and reactive to light. "   Cardiovascular:      Rate and Rhythm: Normal rate and regular rhythm.      Heart sounds: Normal heart sounds.   Pulmonary:      Effort: Pulmonary effort is normal.      Breath sounds: Normal breath sounds.   Abdominal:      General: Bowel sounds are normal.   Musculoskeletal:         General: Normal range of motion.      Cervical back: Normal range of motion.   Skin:     General: Skin is warm and dry.   Neurological:      Mental Status: She is alert and oriented to person, place, and time.   Psychiatric:         Mood and Affect: Mood normal.         Behavior: Behavior normal.       Labs Reviewed:     Hematology:  Lab Results   Component Value Date    WBC 6.98 09/14/2023    HGB 9.6 (L) 09/14/2023    HCT 34.6 (L) 09/14/2023     09/14/2023     Chemistry:  Lab Results   Component Value Date     09/14/2023    K 4.1 09/14/2023    CHLORIDE 107 09/14/2023    BUN 10.1 09/14/2023    CREATININE 0.72 09/14/2023    EGFRNORACEVR >60 09/14/2023    GLUCOSE 92 09/14/2023    CALCIUM 9.2 09/14/2023    ALKPHOS 71 09/14/2023    LABPROT 7.2 09/14/2023    LABPROT 13.6 05/30/2023    ALBUMIN 3.6 09/14/2023    BILIDIR 0.2 10/16/2021    IBILI 0.10 10/16/2021    AST 16 09/14/2023    ALT 13 09/14/2023    MG 2.00 11/10/2022    [id number] 28.9 (L) 08/09/2023     Lab Results   Component Value Date    HGBA1C 5.1 09/13/2022     Lipid Panel:  Lab Results   Component Value Date    CHOL 124 05/18/2023    HDL 32 (L) 05/18/2023    LDL 79.00 05/18/2023    TRIG 64 05/18/2023    TOTALCHOLEST 4 05/18/2023     Thyroid:  Lab Results   Component Value Date    TSH 0.6313 09/13/2022     Urine:  Lab Results   Component Value Date    COLORUA Light-Yellow 10/28/2022    APPEARANCEUA Turbid (A) 10/28/2022    SGUA 1.020 10/28/2022    PHUA 6.0 10/28/2022    PROTEINUA Negative 10/28/2022    GLUCOSEUA Normal 10/28/2022    KETONESUA Negative 10/28/2022    BLOODUA Trace (A) 10/28/2022    NITRITESUA Negative 10/28/2022    LEUKOCYTESUR Negative 10/28/2022     RBCUA 0-5 10/28/2022    WBCUA 0-5 10/28/2022    BACTERIA Trace (A) 10/28/2022    SQEPUA Moderate (A) 10/28/2022    HYALINECASTS None Seen 10/28/2022        Assessment:       ICD-10-CM ICD-9-CM   1. Primary hypertension  I10 401.9   2. Anemia, unspecified type  D64.9 285.9   3. Class 3 severe obesity due to excess calories with serious comorbidity and body mass index (BMI) of 40.0 to 44.9 in adult  E66.01 278.01    Z68.41 V85.41        Plan:     1. Primary hypertension  Vitals:    09/19/23 1254   BP: 114/82   Pulse: 82   Resp: 18   Temp: 98.6 °F (37 °C)      No results found for this or any previous visit.  Results for orders placed or performed during the hospital encounter of 11/10/22   EKG 12-lead    Collection Time: 11/10/22  4:23 AM    Narrative    Test Reason : R07.9,    Vent. Rate : 074 BPM     Atrial Rate : 074 BPM     P-R Int : 188 ms          QRS Dur : 088 ms      QT Int : 452 ms       P-R-T Axes : 076 061 020 degrees     QTc Int : 501 ms    Normal sinus rhythm  Prolonged QT  Abnormal ECG  No previous ECGs available  Confirmed by Everton Paige MD ([id number]) on 11/10/2022 2:53:46 PM    Referred By: [email or web address]   SELF           Confirmed By:Everton Paige MD   Continue HCTZ 25 mg daily  DASH diet  Encouraged home blood pressure monitoring  - Ambulatory referral/consult to Bariatric Surgery; Future    2. Anemia, unspecified type  RBC   Date Value Ref Range Status   09/14/2023 4.77 4.20 - 5.40 x10(6)/mcL Final     Hgb   Date Value Ref Range Status   09/14/2023 9.6 (L) 12.0 - 16.0 g/dL Final     Hct   Date Value Ref Range Status   09/14/2023 34.6 (L) 37.0 - 47.0 % Final     MCV   Date Value Ref Range Status   09/14/2023 72.5 (L) 80.0 - 94.0 fL Final     Iron Level   Date Value Ref Range Status   05/17/2023 256 (H) 50 - 170 ug/dL Final     Iron Binding Capacity Total   Date Value Ref Range Status   05/17/2023 353 250 - 450 ug/dL Final     Ferritin Level   Date Value Ref Range Status   05/17/2023 18.64 4.63 - 204.00  ng/mL Final   Start Ferrous Gluconate 324 mg daily    3. Class 3 severe obesity due to excess calories with serious comorbidity and body mass index (BMI) of 40.0 to 44.9 in adult  Body mass index is 40.69 kg/m².  Thyroid Stimulating Hormone   Date Value Ref Range Status   09/13/2022 0.6313 0.3500 - 4.9400 uIU/mL Final     Vit D 25 OH   Date Value Ref Range Status   08/09/2023 28.9 (L) 30.0 - 80.0 ng/mL Final     Hemoglobin A1c   Date Value Ref Range Status   09/13/2022 5.1 <=7.0 % Final   Sleep Study: none noted  Weight Loss Encouraged  Increase Physical Activity  - Ambulatory referral/consult to Bariatric Surgery; Future      Follow up if symptoms worsen or fail to improve. In addition to their scheduled follow up, the patient has also been instructed to follow up on as needed basis.     SHAMIR BatistaP

## 2023-09-26 ENCOUNTER — HOSPITAL ENCOUNTER (OUTPATIENT)
Dept: RADIOLOGY | Facility: HOSPITAL | Age: 38
Discharge: HOME | End: 2023-09-26
Attending: PSYCHIATRY & NEUROLOGY
Payer: MEDICAID

## 2023-09-26 ENCOUNTER — TELEPHONE (OUTPATIENT)
Dept: NEUROLOGY | Facility: CLINIC | Age: 38
End: 2023-09-26
Payer: MEDICAID

## 2023-09-26 DIAGNOSIS — G44.59 OTHER COMPLICATED HEADACHE SYNDROME: ICD-10-CM

## 2023-09-26 PROCEDURE — 70544 MR ANGIOGRAPHY HEAD W/O DYE: CPT | Mod: TC

## 2023-09-26 NOTE — TELEPHONE ENCOUNTER
Spoke with Xena at [facility] and left msg for Dr. Sicard with Dr. Salas's response. Also called and notified patient. She verbalized understanding and was grateful to be notified.

## 2023-09-26 NOTE — TELEPHONE ENCOUNTER
----- Message from Lisette Madden sent at 9/26/2023  1:17 PM CDT -----  Dr Laurie Sicard from [facility] called stating that she faxed over records on the pt. She stated that pt is having reduction in her vision and wants to see if she can follow up sooner than Nov.  If any questions please call   [facility] [phone]  Cell phone [phone]

## 2023-09-28 ENCOUNTER — HOSPITAL ENCOUNTER (OUTPATIENT)
Dept: RADIOLOGY | Facility: HOSPITAL | Age: 38
Discharge: HOME | End: 2023-09-28
Attending: OPTOMETRIST
Payer: MEDICAID

## 2023-09-28 VITALS — SYSTOLIC BLOOD PRESSURE: 114 MMHG | OXYGEN SATURATION: 100 % | DIASTOLIC BLOOD PRESSURE: 76 MMHG | HEART RATE: 60 BPM

## 2023-09-28 DIAGNOSIS — E66.01 CLASS 3 SEVERE OBESITY DUE TO EXCESS CALORIES WITH SERIOUS COMORBIDITY AND BODY MASS INDEX (BMI) OF 40.0 TO 44.9 IN ADULT: ICD-10-CM

## 2023-09-28 DIAGNOSIS — H47.11 PAPILLEDEMA ASSOCIATED WITH INCREASED INTRACRANIAL PRESSURE: ICD-10-CM

## 2023-09-28 DIAGNOSIS — G44.59 OTHER COMPLICATED HEADACHE SYNDROME: ICD-10-CM

## 2023-09-28 DIAGNOSIS — D64.9 ANEMIA, UNSPECIFIED TYPE: ICD-10-CM

## 2023-09-28 DIAGNOSIS — H47.10 PAPILLEDEMA OF BOTH EYES: ICD-10-CM

## 2023-09-28 LAB
APPEARANCE CSF: CLEAR
COLOR CSF: COLORLESS
FERRITIN SERPL-MCNC: 9.97 NG/ML (ref 4.63–204)
GLUCOSE CSF-MCNC: 64 MG/DL (ref 40–70)
IRON SATN MFR SERPL: 14 % (ref 20–50)
IRON SERPL-MCNC: 50 UG/DL (ref 50–170)
POC PTINR: 1.1 (ref 0.9–1.2)
POC PTWBT: 13.3 SEC (ref 9.7–14.3)
PROT CSF-MCNC: 40.1 MG/DL (ref 15–45)
RBC # CSF MANUAL: 0 /UL
SAMPLE: NORMAL
TIBC SERPL-MCNC: 312 UG/DL (ref 70–310)
TIBC SERPL-MCNC: 362 UG/DL (ref 250–450)
TRANSFERRIN SERPL-MCNC: 331 MG/DL (ref 180–382)
WBC # CSF MANUAL: 0 /UL (ref 0–5)

## 2023-09-28 PROCEDURE — 84157 ASSAY OF PROTEIN OTHER: CPT

## 2023-09-28 PROCEDURE — 82728 ASSAY OF FERRITIN: CPT | Performed by: NURSE PRACTITIONER

## 2023-09-28 PROCEDURE — 83550 IRON BINDING TEST: CPT | Performed by: NURSE PRACTITIONER

## 2023-09-28 PROCEDURE — 62328 DX LMBR SPI PNXR W/FLUOR/CT: CPT

## 2023-09-28 PROCEDURE — 89051 BODY FLUID CELL COUNT: CPT

## 2023-09-28 PROCEDURE — 82945 GLUCOSE OTHER FLUID: CPT

## 2023-09-28 PROCEDURE — 87070 CULTURE OTHR SPECIMN AEROBIC: CPT

## 2023-09-28 NOTE — BRIEF OP NOTE
[facility] - Xray  Radiology    Procedure Performed by: Jamal Field MD  Date: 09/28/2023 Time: 9:30 AM    Procedure: fluoro-guided lumbar puncture    Pre-Op Diagnosis: increased intracranial pressure  Post-Op Diagnosis/Findings: same    Specimen/Tissue Removed: CSF  Color - clear  Volume - 8.5 mL  Opening pressure - 37 cm CSF  Closing pressure - 26 cm CSF    Brief Details/Findings:  Informed consent and pre-procedure timeout accomplished, followed by prep/drape of the lower back in standard fashion.  Intermittent fluoroscopy used to guide percutaneous needle access into the thecal space, with spontaneous return of CSF. Following fluid collection, the needle was removed uneventfully.    Needle Out Time: 0907  Complications: none immediately identified  Estimated Blood Loss: less than 1mL      Please refer to dedicated imaging procedure report for additional details.      Jamal Field MD  Radiology  [phone] // .[phone] // .[phone]

## 2023-10-02 ENCOUNTER — TELEPHONE (OUTPATIENT)
Dept: NEUROLOGY | Facility: CLINIC | Age: 38
End: 2023-10-02
Payer: MEDICAID

## 2023-10-02 DIAGNOSIS — G93.2 IIH (IDIOPATHIC INTRACRANIAL HYPERTENSION): Primary | ICD-10-CM

## 2023-10-02 RX ORDER — ACETAZOLAMIDE 500 MG/1
500 CAPSULE, EXTENDED RELEASE ORAL 2 TIMES DAILY
Qty: 60 CAPSULE | Refills: 2 | Status: SHIPPED | OUTPATIENT
Start: 2023-11-01 | End: 2023-11-06 | Stop reason: SDUPTHER

## 2023-10-02 RX ORDER — ACETAZOLAMIDE 500 MG/1
CAPSULE, EXTENDED RELEASE ORAL
Qty: 46 CAPSULE | Refills: 0 | Status: SHIPPED | OUTPATIENT
Start: 2023-10-02 | End: 2023-11-01

## 2023-10-02 NOTE — PROGRESS NOTES
Please let Ms. Saxena know that her labs are within normal limits but her opening pressure is high on her Lumbar Puncture. Please tell her to discontinue Topiramate and start Diamox 500 mg daily for 2 weeks then 500 mg twice a day thereafter. Will send new prescription to her pharmacy.

## 2023-10-02 NOTE — TELEPHONE ENCOUNTER
Contacted the patient and she mentioned that she stopped taking the Acetazolamide medication, she states it wasn't working for her.  Patient also wanted Dr. Salas to know that she did a lumber puncture on 09/28/2023 and she states her headaches are still bad. Patient is requesting a new medication to help the headaches.

## 2023-10-02 NOTE — TELEPHONE ENCOUNTER
Patient mentioned that she wasn't made aware of the reason why she has to take the Diamox and patient is requesting for an explanation to further explain why she is taking the Diamox.

## 2023-10-02 NOTE — TELEPHONE ENCOUNTER
----- Message from Liane Salas MD sent at 10/2/2023  7:43 AM CDT -----  Regarding: medication change.  Also, please let Ms. Saxena know to stop Hydrochlorothiazide once she starts Acetazolamide as this may make her feel extremely dizzy if she takes both together.       Liane Salas MD

## 2023-10-03 LAB
BACTERIA CSF CULT: NORMAL
GRAM STN SPEC: NORMAL

## 2023-10-05 ENCOUNTER — TELEPHONE (OUTPATIENT)
Dept: INTERNAL MEDICINE | Facility: CLINIC | Age: 38
End: 2023-10-05
Payer: MEDICAID

## 2023-10-05 NOTE — TELEPHONE ENCOUNTER
----- Message from CAIN Batista sent at 10/3/2023 12:52 PM CDT -----  Regarding: RE: medication question  As per Dr. Salas's message on 10/02/2023; patient is to stop taking Hydrochlorothiazide once she starts Acetazolamide (Diamox) as this may make her feel extremely dizzy if she takes both together.     ----- Message -----  From: Livier Dupree LPN  Sent: 10/2/2023   2:56 PM CDT  To: CAIN Batista  Subject: FW: medication question                            ----- Message -----  From: Idalmis Reed  Sent: 10/2/2023   9:57 AM CDT  To: Alyson CASTRO Staff  Subject: medication question                              Pt is calling stating that Liane Salas would like to know if she could stop taking her high blood pressure meds because she would like to prescribe her a med that can not be taken with her high blood pressure meds.  She did not tell me the med.  I tried to call her back and she was on the phone and was suppose to return call

## 2023-10-06 ENCOUNTER — TELEPHONE (OUTPATIENT)
Dept: NEUROLOGY | Facility: CLINIC | Age: 38
End: 2023-10-06
Payer: MEDICAID

## 2023-10-06 NOTE — TELEPHONE ENCOUNTER
----- Message from Dulce Maria Pa sent at 10/6/2023 12:17 PM CDT -----  Regarding: Please Advise  Patient called and stated since she has been off of her blood pressure medication she is catching pains in the back of her neck and light headaches every once in a while.     Patient stated she would like to speak to Dr. Salas regarding this and would not like to be reached by Dr. Hunter nurse at all.  Patient can be reached at [phone]    Please Advise

## 2023-10-16 ENCOUNTER — HOSPITAL ENCOUNTER (OUTPATIENT)
Dept: RADIOLOGY | Facility: HOSPITAL | Age: 38
Discharge: HOME | End: 2023-10-16
Attending: NURSE PRACTITIONER
Payer: MEDICAID

## 2023-10-16 DIAGNOSIS — N64.4 BREAST PAIN: ICD-10-CM

## 2023-10-16 PROCEDURE — 77066 DX MAMMO INCL CAD BI: CPT | Mod: TC

## 2023-10-16 PROCEDURE — 76642 ULTRASOUND BREAST LIMITED: CPT | Mod: TC,50

## 2023-10-16 PROCEDURE — 76642 ULTRASOUND BREAST LIMITED: CPT | Mod: 26,50,, | Performed by: RADIOLOGY

## 2023-10-16 PROCEDURE — 77066 MAMMO DIGITAL DIAGNOSTIC BILAT WITH TOMO: ICD-10-PCS | Mod: 26,,, | Performed by: RADIOLOGY

## 2023-10-16 PROCEDURE — 77062 MAMMO DIGITAL DIAGNOSTIC BILAT WITH TOMO: ICD-10-PCS | Mod: 26,,, | Performed by: RADIOLOGY

## 2023-10-16 PROCEDURE — 77062 BREAST TOMOSYNTHESIS BI: CPT | Mod: 26,,, | Performed by: RADIOLOGY

## 2023-10-16 PROCEDURE — 76642 US BREAST BILATERAL LIMITED: ICD-10-PCS | Mod: 26,50,, | Performed by: RADIOLOGY

## 2023-10-16 PROCEDURE — 77066 DX MAMMO INCL CAD BI: CPT | Mod: 26,,, | Performed by: RADIOLOGY

## 2023-11-06 ENCOUNTER — TELEPHONE (OUTPATIENT)
Dept: NEUROLOGY | Facility: CLINIC | Age: 38
End: 2023-11-06
Payer: MEDICAID

## 2023-11-06 DIAGNOSIS — G93.2 IIH (IDIOPATHIC INTRACRANIAL HYPERTENSION): ICD-10-CM

## 2023-11-06 RX ORDER — ACETAZOLAMIDE 500 MG/1
500 CAPSULE, EXTENDED RELEASE ORAL 2 TIMES DAILY
Qty: 60 CAPSULE | Refills: 2 | Status: SHIPPED | OUTPATIENT
Start: 2023-11-06 | End: 2024-03-26 | Stop reason: SDUPTHER

## 2023-11-06 NOTE — TELEPHONE ENCOUNTER
Patient returned call. Confirmed she was calling in regards to the Diamox. Informed her that she should have another prescription to fill at pharmacy to continue her dose of 500mg BID.     Patient wants to know now how long she is supposed to be taking this medication and when will they be checking her eye pressure again to even know if it is working because she doesn't want to continue taking this medication if it isn't even working.    I did inform her this would be discussed at her next appointment, but she states no that she could be told this now because her next appt is virtual and there will be no way to tell if it is helping.

## 2023-11-06 NOTE — TELEPHONE ENCOUNTER
----- Message from Trudy Le sent at 11/6/2023  7:41 AM CST -----  Regarding: Pt finished rx for eye pressure  Pt called to leave a message for Dr. Salas and stated she did finish her rx for eye pressure (pt did not know name of medication) Pt can be reached @ [phone]            Thank You  Kayleen HARDIN

## 2023-11-10 ENCOUNTER — OFFICE VISIT (OUTPATIENT)
Dept: INTERNAL MEDICINE | Facility: CLINIC | Age: 38
End: 2023-11-10
Payer: MEDICAID

## 2023-11-10 DIAGNOSIS — Z12.11 ENCOUNTER FOR COLORECTAL CANCER SCREENING: ICD-10-CM

## 2023-11-10 DIAGNOSIS — G43.109 MIGRAINE WITH AURA AND WITHOUT STATUS MIGRAINOSUS, NOT INTRACTABLE: ICD-10-CM

## 2023-11-10 DIAGNOSIS — Z80.0 FAMILY HISTORY OF COLORECTAL CANCER: ICD-10-CM

## 2023-11-10 DIAGNOSIS — I10 PRIMARY HYPERTENSION: Primary | Chronic | ICD-10-CM

## 2023-11-10 DIAGNOSIS — Z12.12 ENCOUNTER FOR COLORECTAL CANCER SCREENING: ICD-10-CM

## 2023-11-10 PROCEDURE — 1159F MED LIST DOCD IN RCRD: CPT | Mod: CPTII,95,, | Performed by: NURSE PRACTITIONER

## 2023-11-10 PROCEDURE — 1160F RVW MEDS BY RX/DR IN RCRD: CPT | Mod: CPTII,95,, | Performed by: NURSE PRACTITIONER

## 2023-11-10 PROCEDURE — 1160F PR REVIEW ALL MEDS BY PRESCRIBER/CLIN PHARMACIST DOCUMENTED: ICD-10-PCS | Mod: CPTII,95,, | Performed by: NURSE PRACTITIONER

## 2023-11-10 PROCEDURE — 99213 OFFICE O/P EST LOW 20 MIN: CPT | Mod: FQ,95,, | Performed by: NURSE PRACTITIONER

## 2023-11-10 PROCEDURE — 1159F PR MEDICATION LIST DOCUMENTED IN MEDICAL RECORD: ICD-10-PCS | Mod: CPTII,95,, | Performed by: NURSE PRACTITIONER

## 2023-11-10 PROCEDURE — 99213 PR OFFICE/OUTPT VISIT, EST, LEVL III, 20-29 MIN: ICD-10-PCS | Mod: FQ,95,, | Performed by: NURSE PRACTITIONER

## 2023-11-10 RX ORDER — ACETAMINOPHEN 500 MG
1 TABLET ORAL 2 TIMES DAILY
Qty: 1 EACH | Refills: 0 | Status: SHIPPED | OUTPATIENT
Start: 2023-11-10 | End: 2024-04-03

## 2023-11-10 NOTE — PROGRESS NOTES
Patient ID: [id number]     Chief Complaint: Headache (Concerned about blood pressure)    HPI:     Audio Only Telehealth Visit     The patient location is: home  The chief complaint leading to consultation is: B/P  Visit type: Virtual visit with audio only (telephone)  Total time spent with patient: 15 minutes     The reason for the audio only service rather than synchronous audio and video virtual visit was related to technical difficulties or patient preference/necessity.     Each patient to whom I provide medical services by telemedicine is:  (1) informed of the relationship between the physician and patient and the respective role of any other health care provider with respect to management of the patient; and (2) notified that they may decline to receive medical services by telemedicine and may withdraw from such care at any time. Patient verbally consented to receive this service via voice-only telephone call.    This service was not originating from a related E/M service provided within the previous 7 days nor will  to an E/M service or procedure within the next 24 hours or my soonest available appointment.  Prevailing standard of care was able to be met in this audio-only visit.       Ida Saxena is a 38 y.o. female with diagnosis of HTN, Anemia, Abnormal Uterine Bleeding, Venous Insufficiency BLE, Migraines. Patient seen today for blood pressure concern. Patient last seen in clinic on 09/19/2023.   Patient states she was referred to Bariatric Surgery clinic in 01/2023, was approved for sleeve but had a lot going on and was unable to have surgery at that time. New referral ordered on 09/10/2023.   Patient was prescribed Topamax for headaches, states medication made her confused/dizzy so she stopped taking medication.   Patient was referred to Dr. Condon for Venous Insufficiency. Instructed to wear compression stockings. Patient states she hasn't been wearing them and still has swelling. Patient  denies chest pain, SOB.  Patient was prescribed HCTZ 25 mg daily for HTN. Neurology started patient on Diamox for Papilledema and headaches and discontinued HCTZ. Patient concerned about her blood pressure due to being off HCTZ. Patient states she still gets headache at times. Patient states she does not have a B/P monitor at home to assess her B/P. Patient denies SOB, chest pain.  At previous appt, patient stated tenderness to bilateral breast, described as pricking sensation. Patient had screening MMG in 10/2022, negative. Diagnostic MMG completed on 10/16/2023, negative.  Patient also requesting colonoscopy due to maternal grandmother having colorectal cancer. Patient denies colorectal cancer in parents, siblings.    Patient denies any other acute complaints.     Patient followed by Neurology Clinic. Last appointment on 09/14/2023. This is 38 y.o. female with history of obesity, pituitary microadenoma, hypertension, who is referred headache with papilledema along medication overuse headache. Will need to rule out CVST, DESTINY. Papilledema of both eyes:  stop Sumatriptan 50 mg twice a day as needed. decrease BC powder use by 1 packet per week. stop Tylenol and Ibuprofen PRN. start Topiramate 50 mg at bedtime for 2 weeks then 100 mg at bedtime thereafter. MRV Brain +/- Amol. IR LP with opening pressure at [facility]. Referral to sleep center for PSG. CBC, CMP, INR, Beta HCG today. weight loss highly advised at this point in time given IIH/DESTINY are comorbid conditions related to weight gain. Patient advised to follow up with PCP to pursue bariatric surgery evaluation or weight loss medications. Would recommend losing around 10% of baseline weight at this point in time. Patient has follow up appointment scheduled for 11/20/2023.     Patient followed by Neurosurgery Clinic, Dr. Michael Bridges in [facility]. Last appointment on 08/16/2023. 38 y.o.female nonfunctioning pituitary microadenoma. Follow up in clinic in 3 months with  MRI pituitary gland. Patient has follow up appointment scheduled for 2024.     Review of patient's allergies indicates:  No Known Allergies    Breast Cancer Screening: MMG negative on 10/16/2023  Cervical Cancer Screenin2023  Colorectal Cancer Screening: deferred due to age  Diabetic Eye Exam: N/A  Diabetic Foot Exam: N/A  Lung Cancer Screening: N/A  Prostate Cancer Screening: N/A  AAA Screening: N/A  Osteoporosis Screening: deferred due to age  Medicare Wellness: N/A  Immunizations:   Immunization History   Administered Date(s) Administered    DTP 1985, 1985, 1986, 1986, 1990    HIB 1990    MMR 1986    PPD Test 2001    Poliovirus 1985, 1985, 1986, 1990    Td - PF (ADULT) 1998     Past Surgical History:   Procedure Laterality Date     SECTION      x 3    DILATION AND CURETTAGE OF UTERUS N/A 2023    Procedure: DILATION AND CURETTAGE, UTERUS fx;  Surgeon: Jose Canales Jr., MD;  Location: [facility];  Service: OB/GYN;  Laterality: N/A;     family history includes Cancer in her maternal grandmother; Heart murmur in her mother; Hypothyroidism in her mother.    Social History     Socioeconomic History    Marital status: Single   Tobacco Use    Smoking status: Former     Current packs/day: 0.00     Types: Cigarettes, Vaping with nicotine     Quit date: 2021     Years since quittin.5    Smokeless tobacco: Never   Substance and Sexual Activity    Alcohol use: Yes     Alcohol/week: 2.0 standard drinks of alcohol     Types: 2 Glasses of wine per week     Comment: occassionally    Drug use: Never    Sexual activity: Yes     Partners: Male     Social Determinants of Health     Housing Stability: High Risk (11/10/2023)    Housing Stability Vital Sign     Unable to Pay for Housing in the Last Year: Yes     Unstable Housing in the Last Year: Yes     Current Outpatient Medications   Medication Instructions    acetaZOLAMIDE  (DIAMOX) 500 mg, Oral, 2 times daily    aspirin/salicylamide/caffeine (BC HEADACHE POWDER ORAL) Oral, Daily    blood pressure monitor Kit 1 each, Misc.(Non-Drug; Combo Route), 2 times daily    ferrous gluconate (FERGON) 324 mg, Oral, With breakfast       Subjective:     Review of Systems   Constitutional: Negative.    HENT: Negative.     Eyes: Negative.    Respiratory: Negative.     Cardiovascular: Negative.    Gastrointestinal: Negative.    Endocrine: Negative.    Genitourinary: Negative.    Musculoskeletal: Negative.    Skin: Negative.    Allergic/Immunologic: Negative.    Neurological:  Positive for headaches.   Hematological: Negative.    Psychiatric/Behavioral: Negative.         Objective:     There were no vitals taken for this visit.      Physical Exam  Neurological:      Mental Status: She is alert and oriented to person, place, and time.   Psychiatric:         Mood and Affect: Mood normal.       Labs Reviewed:     Hematology:  Lab Results   Component Value Date    WBC 6.98 09/14/2023    HGB 9.6 (L) 09/14/2023    HCT 34.6 (L) 09/14/2023     09/14/2023     Chemistry:  Lab Results   Component Value Date     09/14/2023    K 4.1 09/14/2023    CHLORIDE 107 09/14/2023    BUN 10.1 09/14/2023    CREATININE 0.72 09/14/2023    EGFRNORACEVR >60 09/14/2023    GLUCOSE 92 09/14/2023    CALCIUM 9.2 09/14/2023    ALKPHOS 71 09/14/2023    LABPROT 7.2 09/14/2023    LABPROT 13.6 05/30/2023    ALBUMIN 3.6 09/14/2023    BILIDIR 0.2 10/16/2021    IBILI 0.10 10/16/2021    AST 16 09/14/2023    ALT 13 09/14/2023    MG 2.00 11/10/2022    [id number] 28.9 (L) 08/09/2023     Lab Results   Component Value Date    HGBA1C 5.1 09/13/2022     Lipid Panel:  Lab Results   Component Value Date    CHOL 124 05/18/2023    HDL 32 (L) 05/18/2023    LDL 79.00 05/18/2023    TRIG 64 05/18/2023    TOTALCHOLEST 4 05/18/2023     Thyroid:  Lab Results   Component Value Date    TSH 0.6313 09/13/2022     Urine:  Lab Results   Component Value  Date    COLORUA Light-Yellow 10/28/2022    APPEARANCEUA Turbid (A) 10/28/2022    SGUA 1.020 10/28/2022    PHUA 6.0 10/28/2022    PROTEINUA Negative 10/28/2022    GLUCOSEUA Normal 10/28/2022    KETONESUA Negative 10/28/2022    BLOODUA Trace (A) 10/28/2022    NITRITESUA Negative 10/28/2022    LEUKOCYTESUR Negative 10/28/2022    RBCUA 0-5 10/28/2022    WBCUA 0-5 10/28/2022    BACTERIA Trace (A) 10/28/2022    SQEPUA Moderate (A) 10/28/2022    HYALINECASTS None Seen 10/28/2022        Assessment:       ICD-10-CM ICD-9-CM   1. Primary hypertension  I10 401.9   2. Migraine with aura and without status migrainosus, not intractable  G43.109 346.00   3. Family history of colorectal cancer  Z80.0 V16.0   4. Encounter for colorectal cancer screening  Z12.11 V76.51    Z12.12 V76.41        Plan:     1. Primary hypertension  There were no vitals filed for this visit.   No results found for this or any previous visit.  Results for orders placed or performed during the hospital encounter of 11/10/22   EKG 12-lead    Collection Time: 11/10/22  4:23 AM    Narrative    Test Reason : R07.9,    Vent. Rate : 074 BPM     Atrial Rate : 074 BPM     P-R Int : 188 ms          QRS Dur : 088 ms      QT Int : 452 ms       P-R-T Axes : 076 061 020 degrees     QTc Int : 501 ms    Normal sinus rhythm  Prolonged QT  Abnormal ECG  No previous ECGs available  Confirmed by Everton Paige MD ([id number]) on 11/10/2022 2:53:46 PM    Referred By: [email or web address]   SELF           Confirmed By:Everton Paige MD   Continue Diamox 500 mg bid  Case management referral ordered for B/P monitor  DASH diet  Encouraged home blood pressure monitoring  - Ambulatory referral/consult to Outpatient Case Management    2. Migraine with aura and without status migrainosus, not intractable  Followed by Neurology Clinic    3. Family history of colorectal cancer  - Ambulatory referral/consult to Gastroenterology; Future    4. Encounter for colorectal cancer screening  - Ambulatory  referral/consult to Gastroenterology; Future      Follow up in about 3 months (around 2/10/2024) for Labs. In addition to their scheduled follow up, the patient has also been instructed to follow up on as needed basis.     Mary Shields, CAIN

## 2023-11-16 PROBLEM — G93.2 IIH (IDIOPATHIC INTRACRANIAL HYPERTENSION): Status: ACTIVE | Noted: 2023-11-16

## 2023-11-16 NOTE — PROGRESS NOTES
[facility] Neurology Follow Up Telemedicine Visit Note    Initial Visit Date: 9/14/2023  Last Visit Date: 9/14/2023  Current Visit Date:  11/20/2023    Chief Complaint:     Chief Complaint   Patient presents with    migraines     States that she has migraines every now and then. She has a migraine at least once a week but she fines she is doing much better. She states she noticed recently she is having a lot of tingling in both her feet.        History of Present Illness:      This is a real-time audio/video visit that was performed with the originating site at patient's home and the distant site, [facility]. Verbal consent to participate in interactive audio & video visit was obtained.    I discussed with the patient regarding the nature of our telehealth visits, that:    - Our sessions are not being recorded and that personal health information is protected  - Provider would evaluate the patient and recommend diagnostics and treatments based on my assessment  - [facility] will provide follow up care in person if/when the patient needs it.       This is 38 y.o. female with history of obesity, pituitary microadenoma, hypertension, who is referred headache disorder. During last visit, she was found to have papilledema. MRV brain w/o Amol and LP with OP was ordered. Patient was referred for PSG. Sumatriptan, Tylenol, Ibuprofen, and BC powder was discontinued. Patient was started Topiramate. Patient was found to have elevated ICP on LP, thus Topiramate was discontinued and Acetazolamide 500 mg twice a day was started. Patient was advised to follow up with PCP regarding referrral for Bariatric surgery. Headaches has improved.     Age of Onset : 17 years old      Headache Description:   bilateral occipital, temporal, stabbing, severe, impeding day to day activity, lasting 24 hours, without nausea, with photophobia and phonophobia  Bitemporal, stabbing, mild  "to moderate, not impeding day to day activity, lasting 24 hours, without nausea, without photophobia and phonophobia     Frequency: 8 migraine headache days per month     Provocation Factors: dietary     Risk Factors  - Family history of headache disorder: Yes maternal grandmother, mother, and daughter with headache  - History of focal CNS lesions: Yes pituitary microadenoma.  - History of CNS infections: No  - History head trauma: No  - History of underlying mood disorder: Yes mood swings   - History of sleep disorder: Yes frequent night awakening. Wake up tired. Dozes off while driving. Dozes off while watching TV. + Snores. Gained weight. PSG pending.   - Recreational drug use: No  - Tobacco use: Yes 0.5 ppd. Quit 8 months ago.   - Alcohol use: Yes occasional  - Weight fluctuation: Yes obesity. Gaining weight. Starting weight 252 lbs. Pending bariatric surgery evaluation start of 2024  - Isotretinoin or Tetracycline use:  No  - Family planning and contraceptive use: Actively trying to conceive.    Medications:     Current Prophylactic  Acetazolamide 500 mg twice a day (10/2/2023 to present): paresthesia.       Current Abortive  Sumatriptan 50 mg twice a day as needed (3/28/2023 to 9/14/2023): ineffective.  Tylenol PRN: ineffective.     Prior Prophylactic  Topiramate 50 mg at bedtime (9/14/2023 to 9/24/2023): fatigue. Ineffective.     Prior Abortive  BC powder: daily for the past "few years." Takes 2 packet per day.   Tylenol PRN: every other day for the past few years.   Ibuprofen PRN: every other day for the past few years.      Devices:     - VNS:  - TNS  - TMS:     Procedures:     - Botox:  - PSG block:   - Occipital nerve block:     Labs:     Results for orders placed or performed during the hospital encounter of 09/28/23   Cerebrospinal Fluid Culture    Specimen: CSF Tap, Tube 1; CSF (Spinal Fluid)   Result Value Ref Range    CULTURE, CSF ([facility]) Final Report: At 5 days. No growth     GRAM STAIN No WBCs, No " bacteria seen    Iron and TIBC   Result Value Ref Range    Iron Binding Capacity Unsaturated 312 (H) 70 - 310 ug/dL    Iron Level 50 50 - 170 ug/dL    Transferrin 331 180 - 382 mg/dL    Iron Binding Capacity Total 362 250 - 450 ug/dL    Iron Saturation 14 (L) 20 - 50 %   Ferritin   Result Value Ref Range    Ferritin Level 9.97 4.63 - 204.00 ng/mL   Glucose, CSF   Result Value Ref Range    Glucose CSF  64 40 - 70 mg/dL   Protein, CSF   Result Value Ref Range    Protein CSF  40.1 15.0 - 45.0 mg/dL   Cell Count, CSF   Result Value Ref Range    Appear CSF Clear Clear    Color CSF Colorless Colorless    WBC CSF 0 0 - 5 /uL    RBC CSF 0 /uL   ISTAT PROCEDURE   Result Value Ref Range    POC PTWBT 13.3 9.7 - 14.3 sec    POC PTINR 1.1 0.9 - 1.2    Sample unknown        Studies:     - MRI Brain +/- Amol 11/22/2022:  I have reviewed the study independently and with the patient. Metallic artifact. Subtle posterior globe flattening OU.   - MRV Head w/o Amol 9/26/2023:  I have reviewed the study independently and with the patient. Unremarkable.    - Lumbar Puncture 9/28/2023: Opening pressure: 37 cm CSF. Closing pressure: 26 cm CSF    Review of Systems:     Review of Systems   All other systems reviewed and are negative.      Physical Exams:     Physical Exam  Nursing note reviewed.   Constitutional:       Appearance: Normal appearance.   HENT:      Head: Atraumatic.      Nose: Nose normal.   Cardiovascular:      Rate and Rhythm: Normal rate.   Pulmonary:      Effort: Pulmonary effort is normal.   Musculoskeletal:         General: Normal range of motion.   Neurological:      Mental Status: She is alert.       Telemedicine Comprehensive Neurological Exam:  Mental Status: Alert Oriented to Self, Date, and Place. Comprehension wnl. No dysarthria.   CN II - XII: EZRA, VA grossly intact, Hearing grossly intact, Face Symmetric, Tongue and Uvula midline, Trapezius symmetric bilateral.   Motor: no abnormal involuntary or voluntary  movements, Fine finger movements wnl b/l, No pronator drift.   Sensory: unable to assess.  Reflexes: unable to assess.   Cerebellar: RAHM wnl b/l.  Romberg: absent.   Gait: normal.      Assessment:     This is 38 y.o. female with history of obesity, pituitary microadenoma, hypertension, who is referred for IIH and episodic migraine without aura.     Problem List Items Addressed This Visit          Neuro    Migraine with aura and without status migrainosus, not intractable    IIH (idiopathic intracranial hypertension) - Primary       Endocrine    Class 3 severe obesity due to excess calories with serious comorbidity and body mass index (BMI) of 40.0 to 44.9 in adult (Chronic)       Plan:     [] pending PSG   [] pending Bariatrics evaluation   [] patient advised to follow up with Ophthalmology for serial eye exam   [] weight loss and IIH remission discussed with patient: would recommend losing approximately 10% of starting weight (25 lbs) for IIH to be in remission.   [] continue with Acetazolamide 500 mg twice a day  [] start Rizatriptan 10 mg twice a day as needed      RTC 4 months in clinic    Headache education provided: good sleep hygiene and 7 hours of sleep per night, stress management, medication overuse education provided. Using more 3 OTC per week may worsen headaches, high intensity interval training has shown to reduce headache frequency. Low carb, high protein has shown to reduce headache frequency. Patient is instructed in keep headache diary.     I have explained the treatment plan, diagnosis, and prognosis to patient. All questions are answered to the best of my knowledge.     Face to face time 30 minutes, including documentation, chart review, counseling, education, review of test results, relevant medical records, and coordination of care.       Liane Salas MD   General Neurology  11/20/2023

## 2023-11-20 ENCOUNTER — OFFICE VISIT (OUTPATIENT)
Dept: NEUROLOGY | Facility: CLINIC | Age: 38
End: 2023-11-20
Payer: MEDICAID

## 2023-11-20 DIAGNOSIS — G93.2 IIH (IDIOPATHIC INTRACRANIAL HYPERTENSION): Primary | ICD-10-CM

## 2023-11-20 DIAGNOSIS — G43.109 MIGRAINE WITH AURA AND WITHOUT STATUS MIGRAINOSUS, NOT INTRACTABLE: ICD-10-CM

## 2023-11-20 DIAGNOSIS — E66.01 CLASS 3 SEVERE OBESITY DUE TO EXCESS CALORIES WITH SERIOUS COMORBIDITY AND BODY MASS INDEX (BMI) OF 40.0 TO 44.9 IN ADULT: ICD-10-CM

## 2023-11-20 PROCEDURE — 99214 OFFICE O/P EST MOD 30 MIN: CPT | Mod: 95,,, | Performed by: PSYCHIATRY & NEUROLOGY

## 2023-11-20 PROCEDURE — 1159F PR MEDICATION LIST DOCUMENTED IN MEDICAL RECORD: ICD-10-PCS | Mod: CPTII,95,, | Performed by: PSYCHIATRY & NEUROLOGY

## 2023-11-20 PROCEDURE — 99214 PR OFFICE/OUTPT VISIT, EST, LEVL IV, 30-39 MIN: ICD-10-PCS | Mod: 95,,, | Performed by: PSYCHIATRY & NEUROLOGY

## 2023-11-20 PROCEDURE — 1159F MED LIST DOCD IN RCRD: CPT | Mod: CPTII,95,, | Performed by: PSYCHIATRY & NEUROLOGY

## 2023-11-20 RX ORDER — RIZATRIPTAN BENZOATE 10 MG/1
10 TABLET ORAL 2 TIMES DAILY PRN
Qty: 9 TABLET | Refills: 4 | Status: SHIPPED | OUTPATIENT
Start: 2023-11-20 | End: 2024-03-26 | Stop reason: SDUPTHER

## 2023-12-26 ENCOUNTER — HOSPITAL ENCOUNTER (EMERGENCY)
Facility: HOSPITAL | Age: 38
Discharge: HOME | End: 2023-12-27
Attending: FAMILY MEDICINE
Payer: MEDICAID

## 2023-12-26 DIAGNOSIS — R07.89 ATYPICAL CHEST PAIN: Primary | ICD-10-CM

## 2023-12-26 LAB
ALBUMIN SERPL-MCNC: 3.4 G/DL (ref 3.5–5)
ALBUMIN/GLOB SERPL: 1 RATIO (ref 1.1–2)
ALP SERPL-CCNC: 83 UNIT/L (ref 40–150)
ALT SERPL-CCNC: 10 UNIT/L (ref 0–55)
AST SERPL-CCNC: 12 UNIT/L (ref 5–34)
B-HCG UR QL: NEGATIVE
BASOPHILS # BLD AUTO: 0.04 X10(3)/MCL
BASOPHILS NFR BLD AUTO: 0.6 %
BILIRUB SERPL-MCNC: 0.2 MG/DL
BUN SERPL-MCNC: 11.4 MG/DL (ref 7–18.7)
CALCIUM SERPL-MCNC: 9 MG/DL (ref 8.4–10.2)
CHLORIDE SERPL-SCNC: 113 MMOL/L (ref 98–107)
CO2 SERPL-SCNC: 22 MMOL/L (ref 22–29)
CREAT SERPL-MCNC: 0.85 MG/DL (ref 0.55–1.02)
CTP QC/QA: YES
EOSINOPHIL # BLD AUTO: 0.16 X10(3)/MCL (ref 0–0.9)
EOSINOPHIL NFR BLD AUTO: 2.4 %
ERYTHROCYTE [DISTWIDTH] IN BLOOD BY AUTOMATED COUNT: 16.4 % (ref 11.5–17)
GFR SERPLBLD CREATININE-BSD FMLA CKD-EPI: >60 MLS/MIN/1.73/M2
GLOBULIN SER-MCNC: 3.4 GM/DL (ref 2.4–3.5)
GLUCOSE SERPL-MCNC: 83 MG/DL (ref 74–100)
HCT VFR BLD AUTO: 33.4 % (ref 37–47)
HGB BLD-MCNC: 9.9 G/DL (ref 12–16)
IMM GRANULOCYTES # BLD AUTO: 0.02 X10(3)/MCL (ref 0–0.04)
IMM GRANULOCYTES NFR BLD AUTO: 0.3 %
LYMPHOCYTES # BLD AUTO: 2.17 X10(3)/MCL (ref 0.6–4.6)
LYMPHOCYTES NFR BLD AUTO: 32.6 %
MAGNESIUM SERPL-MCNC: 1.8 MG/DL (ref 1.6–2.6)
MCH RBC QN AUTO: 22.9 PG (ref 27–31)
MCHC RBC AUTO-ENTMCNC: 29.6 G/DL (ref 33–36)
MCV RBC AUTO: 77.3 FL (ref 80–94)
MONOCYTES # BLD AUTO: 0.59 X10(3)/MCL (ref 0.1–1.3)
MONOCYTES NFR BLD AUTO: 8.9 %
NEUTROPHILS # BLD AUTO: 3.67 X10(3)/MCL (ref 2.1–9.2)
NEUTROPHILS NFR BLD AUTO: 55.2 %
NRBC BLD AUTO-RTO: 0 %
PLATELET # BLD AUTO: 251 X10(3)/MCL (ref 130–400)
PMV BLD AUTO: 9 FL (ref 7.4–10.4)
POTASSIUM SERPL-SCNC: 3.8 MMOL/L (ref 3.5–5.1)
PROT SERPL-MCNC: 6.8 GM/DL (ref 6.4–8.3)
RBC # BLD AUTO: 4.32 X10(6)/MCL (ref 4.2–5.4)
SODIUM SERPL-SCNC: 141 MMOL/L (ref 136–145)
WBC # SPEC AUTO: 6.65 X10(3)/MCL (ref 4.5–11.5)

## 2023-12-26 PROCEDURE — 85025 COMPLETE CBC W/AUTO DIFF WBC: CPT | Performed by: PHYSICIAN ASSISTANT

## 2023-12-26 PROCEDURE — 93005 ELECTROCARDIOGRAM TRACING: CPT

## 2023-12-26 PROCEDURE — 83735 ASSAY OF MAGNESIUM: CPT | Performed by: PHYSICIAN ASSISTANT

## 2023-12-26 PROCEDURE — 83880 ASSAY OF NATRIURETIC PEPTIDE: CPT | Performed by: PHYSICIAN ASSISTANT

## 2023-12-26 PROCEDURE — 80053 COMPREHEN METABOLIC PANEL: CPT | Performed by: PHYSICIAN ASSISTANT

## 2023-12-26 PROCEDURE — 84484 ASSAY OF TROPONIN QUANT: CPT | Performed by: PHYSICIAN ASSISTANT

## 2023-12-26 PROCEDURE — 81025 URINE PREGNANCY TEST: CPT | Performed by: PHYSICIAN ASSISTANT

## 2023-12-26 PROCEDURE — 99285 EMERGENCY DEPT VISIT HI MDM: CPT | Mod: 25

## 2023-12-26 NOTE — Clinical Note
"Ida Mujicarainer" Hemanth was seen and treated in our emergency department on 12/26/2023.  She may return to work on 12/30/2023.       If you have any questions or concerns, please don't hesitate to call.      HEMA Turner RN RN    "

## 2023-12-27 VITALS
TEMPERATURE: 98 F | BODY MASS INDEX: 42.1 KG/M2 | RESPIRATION RATE: 16 BRPM | SYSTOLIC BLOOD PRESSURE: 113 MMHG | WEIGHT: 261.94 LBS | DIASTOLIC BLOOD PRESSURE: 76 MMHG | HEART RATE: 72 BPM | OXYGEN SATURATION: 98 % | HEIGHT: 66 IN

## 2023-12-27 LAB
BNP BLD-MCNC: 14.3 PG/ML
TROPONIN I SERPL-MCNC: <0.01 NG/ML (ref 0–0.04)

## 2023-12-27 RX ORDER — DIPHENHYDRAMINE HYDROCHLORIDE 50 MG/ML
25 INJECTION INTRAMUSCULAR; INTRAVENOUS
Status: DISCONTINUED | OUTPATIENT
Start: 2023-12-27 | End: 2023-12-27

## 2023-12-27 RX ORDER — ESOMEPRAZOLE MAGNESIUM 40 MG/1
40 CAPSULE, DELAYED RELEASE ORAL DAILY
Qty: 60 CAPSULE | Refills: 0 | Status: SHIPPED | OUTPATIENT
Start: 2023-12-27 | End: 2024-04-03

## 2023-12-27 RX ORDER — DIPHENHYDRAMINE HYDROCHLORIDE 50 MG/ML
12.5 INJECTION INTRAMUSCULAR; INTRAVENOUS
Status: DISCONTINUED | OUTPATIENT
Start: 2023-12-27 | End: 2023-12-27

## 2023-12-27 NOTE — ED PROVIDER NOTES
Encounter Date: 2023       History     Chief Complaint   Patient presents with    Chest Pain     X3 days     38-year-old female with past medical history significant for hypertension, anemia, obesity and IIH presents to ED complaining of 3 day history of chest pain.  Patient reports chest pain is located substernally and radiating to right side of the chest and is a pulling sensation in nature.  Patient reports she used to get similar symptoms whenever she smoke, but has quit smoking for over 1 year now.  Patient denies any predictable exacerbating factors, but does reports she sometimes gets this pulling sensation after eating.  Patient denies any identifiable alleviating factors, instead stating that pain seems to resolve randomly.  Does state she gets associated shortness of breath when she gets pain.  Denies any known history of anxiety, but does state she has been under increased stress recently.  Patient reports having a upper respiratory infection approximately one-week ago with heavy coughing but denies any coughing over the last few days.  Denies palpitations, diaphoresis, syncope, orthopnea, edema, calf pain, hemoptysis, abdominal pain, nausea, vomiting, back pain, fever, chills, congestion, sore throat.  Vital signs stable on arrival, patient in no acute distress.      Review of patient's allergies indicates:  No Known Allergies  Past Medical History:   Diagnosis Date    Abnormal uterine bleeding     Anemia 2022    Encounter for Papanicolaou smear of cervix 2023    Hypertension     Missed period 2023     Past Surgical History:   Procedure Laterality Date     SECTION      x 3    DILATION AND CURETTAGE OF UTERUS N/A 2023    Procedure: DILATION AND CURETTAGE, UTERUS fx;  Surgeon: Jose Canales Jr., MD;  Location: [facility];  Service: OB/GYN;  Laterality: N/A;     Family History   Problem Relation Age of Onset    Hypothyroidism Mother     Heart murmur Mother     Cancer  Maternal Grandmother      Social History     Tobacco Use    Smoking status: Former     Current packs/day: 0.00     Types: Cigarettes, Vaping with nicotine     Quit date: 2021     Years since quittin.6    Smokeless tobacco: Never   Substance Use Topics    Alcohol use: Yes     Alcohol/week: 2.0 standard drinks of alcohol     Types: 2 Glasses of wine per week     Comment: occassionally    Drug use: Never     Review of Systems   All other systems reviewed and are negative.      Physical Exam     Initial Vitals [23 2259]   BP Pulse Resp Temp SpO2   (!) 136/92 84 20 98.4 °F (36.9 °C) 98 %      MAP       --         Physical Exam    Nursing note and vitals reviewed.  Constitutional: She appears well-developed and well-nourished. She is not diaphoretic. No distress.   HENT:   Head: Normocephalic and atraumatic.   Mouth/Throat: Oropharynx is clear and moist.   Eyes: Conjunctivae and EOM are normal. Pupils are equal, round, and reactive to light.   Neck: Neck supple. No JVD present.   Normal range of motion.  Cardiovascular:  Normal rate, regular rhythm, normal heart sounds and intact distal pulses.     Exam reveals no gallop and no friction rub.       No murmur heard.  Pulmonary/Chest: Breath sounds normal. No respiratory distress. She has no wheezes. She has no rhonchi. She has no rales. She exhibits no tenderness.   Abdominal: Abdomen is soft. Bowel sounds are normal. She exhibits no distension, no abdominal bruit and no pulsatile midline mass. There is no abdominal tenderness. There is no rebound and no guarding.   Musculoskeletal:         General: No tenderness or edema. Normal range of motion.      Cervical back: Normal range of motion and neck supple.     Neurological: She is alert and oriented to person, place, and time. She has normal strength. No cranial nerve deficit or sensory deficit. GCS score is 15. GCS eye subscore is 4. GCS verbal subscore is 5. GCS motor subscore is 6.   Skin: Skin is warm and  dry. Capillary refill takes less than 2 seconds. No rash noted. No pallor.   Psychiatric: She has a normal mood and affect. Thought content normal.         ED Course   Procedures  Labs Reviewed   COMPREHENSIVE METABOLIC PANEL - Abnormal; Notable for the following components:       Result Value    Chloride 113 (*)     Albumin Level 3.4 (*)     Albumin/Globulin Ratio 1.0 (*)     All other components within normal limits   CBC WITH DIFFERENTIAL - Abnormal; Notable for the following components:    Hgb 9.9 (*)     Hct 33.4 (*)     MCV 77.3 (*)     MCH 22.9 (*)     MCHC 29.6 (*)     All other components within normal limits   TROPONIN I - Normal   MAGNESIUM - Normal   B-TYPE NATRIURETIC PEPTIDE - Normal   CBC W/ AUTO DIFFERENTIAL    Narrative:     The following orders were created for panel order CBC Auto Differential.  Procedure                               Abnormality         Status                     ---------                               -----------         ------                     CBC with Differential[[id number]]       Abnormal            Final result                 Please view results for these tests on the individual orders.   POCT URINE PREGNANCY          Imaging Results              X-Ray Chest 1 View (Preliminary result)  Result time 12/27/23 00:20:34      Wet Read by Andrez Romero PA (12/27/23 00:20:34, [facility], Emergency Medicine)    No acute cardiopulmonary abnormality.                                     Medications - No data to display  Medical Decision Making  Differential diagnosis: Includes but not limited to ACS, CHF, PE, pneumothorax, pneumonia, atypical chest pain, symptomatic anemia, anxiety     ED management:  No indication for acute medical intervention in ED at this time.      ED course:  EKG nonischemic.  Troponin within normal limits.  As symptoms have been present for several days, no increased diagnostic value in obtaining serial troponins.  Patient has  low risk heart and KARIS scores, low suspicion for ACS as origin of symptoms.  BNP within normal limits and no evidence of fluid overload on exam, low suspicion for CHF.  PERC and Wells scores of 0, low suspicion for PE.  CBC reveals normocytic, hypochromic anemia with H&H of 9.9 and 33.4.  These values are actually improved from patient's baseline labs in the computer, very low suspicion for symptomatic anemia as origin of symptoms.  CMP reveals mild hyperchloremia and hypoalbuminemia, but overall unremarkable.  Mag within normal limits.  There is no acute cardiopulmonary abnormality on chest x-ray.  Patient is nontoxic appearing with normal vitals, stable for discharge.  I will discharge patient with daily PPI and have instructed to follow up with PCP this week.  I will also refer patient to cardiology clinic for further evaluation.  Very strict ED precautions given for new or worsening symptoms and patient verbalized understanding.  All test results explained and all questions answered.    Amount and/or Complexity of Data Reviewed  Labs: ordered. Decision-making details documented in ED Course.  Radiology: ordered and independent interpretation performed. Decision-making details documented in ED Course.  ECG/medicine tests: ordered and independent interpretation performed. Decision-making details documented in ED Course.      Additional MDM:   PERC Rule:   Age is greater than or equal to 50 = 0.0  Heart Rate is greater than or equal to 100 = 0.0  SaO2 on room air < 95% = 0.0  Unilateral leg swelling = 0.0  Hemoptysis = 0.0  Recent surgery or trauma = 0.0  Prior PE or DVT =  0.0  Hormone use = 0.00  PERC Score = 0        Well's Criteria Score:  -Clinical symptoms of DVT (leg swelling, pain with palpation) = 0.0  -PE is #1 diagnosis OR equally likely =            0.0  -Heart Rate >100 =   0.0  -Immobilization (= or > than 3 days) or surgery in the previous 4 weeks = 0.0  -Previous DVT/PE = 0.0  -Hemoptysis =           0.0  -Malignancy =           0.0  Well's Probability Score =    0      Heart Score:    History:          Slightly suspicious.  ECG:             Normal  Age:               Less than 45 years  Risk factors: 1-2 risk factors  Troponin:       Less than or equal to normal limit  Heart Score = 1          KARIS Score:   Age over 65:                                    0.00   > or = to 3 CAD risk factors:           0.00  Established CAD:                            0.00  > or = to 2 anginal events in the past 24 hours: 0.00  Use of ASA in past 7 days:              0.00  Elevated Enzymes:                         0.00  ST Depression > or = to 0.05 mV:  0.00  KARIS score = 0                                     Clinical Impression:  Final diagnoses:  [R07.89] Atypical chest pain (Primary)          ED Disposition Condition    Discharge Good          ED Prescriptions       Medication Sig Dispense Start Date End Date Auth. Provider    esomeprazole (NEXIUM) 40 MG capsule Take 1 capsule (40 mg total) by mouth once daily. 60 capsule 12/27/2023 12/26/2024 Andrez Romero PA          Follow-up Information       Follow up With Specialties Details Why Contact Info    Mary Shields FNP Family Medicine Call today  [address] [facility] [address]  [phone]      [facility] Cardiology In 2 weeks  [address] [facility] [id number] [phone]    [facility] Emergency Medicine  As needed, If symptoms worsen [address] [facility] [id number] [phone]             Andrez Romero PA  12/27/23 0028

## 2023-12-27 NOTE — DISCHARGE INSTRUCTIONS
Report to Emergency Department if symptoms return or worsen; [facility] - Medicine Clinic Within 1 to 2 days, It is important that you follow up with your primary care provider or specialist if indicated for further evaluation, workup, and treatment as necessary. The exam and treatment you received in Emergency Department was for an urgent problem and NOT INTENDED AS COMPLETE CARE. It is important that you FOLLOW UP with a doctor for ongoing care. If your symptoms become WORSE or you DO NOT IMPROVE and you are unable to reach your health care provider, you should RETURN to the Emergency Department. The Emergency Department provider has provided a PRELIMINARY INTERPRETATION of all your studies. A final interpretation may be done after you are discharged. If a change in your diagnosis or treatment is needed WE WILL CONTACT YOU. It is critical that we have a CURRENT PHONE NUMBER FOR YOU.

## 2024-01-09 DIAGNOSIS — G47.33 OSA (OBSTRUCTIVE SLEEP APNEA): Primary | ICD-10-CM

## 2024-01-25 ENCOUNTER — TELEPHONE (OUTPATIENT)
Dept: SURGERY | Facility: CLINIC | Age: 39
End: 2024-01-25
Payer: MEDICAID

## 2024-02-16 ENCOUNTER — CLINICAL SUPPORT (OUTPATIENT)
Dept: BARIATRICS | Facility: HOSPITAL | Age: 39
End: 2024-02-16

## 2024-02-16 ENCOUNTER — OFFICE VISIT (OUTPATIENT)
Dept: SURGERY | Facility: CLINIC | Age: 39
End: 2024-02-16
Payer: MEDICAID

## 2024-02-16 VITALS — BODY MASS INDEX: 42.26 KG/M2 | WEIGHT: 261.81 LBS

## 2024-02-16 VITALS
HEART RATE: 72 BPM | BODY MASS INDEX: 43.52 KG/M2 | BODY MASS INDEX: 42.06 KG/M2 | WEIGHT: 261.19 LBS | WEIGHT: 261.69 LBS | DIASTOLIC BLOOD PRESSURE: 81 MMHG | HEIGHT: 65 IN | HEIGHT: 66 IN | SYSTOLIC BLOOD PRESSURE: 118 MMHG

## 2024-02-16 DIAGNOSIS — I61.9 NONTRAUMATIC INTRACEREBRAL HEMORRHAGE, UNSPECIFIED CEREBRAL LOCATION, UNSPECIFIED LATERALITY: ICD-10-CM

## 2024-02-16 DIAGNOSIS — K21.9 GASTROESOPHAGEAL REFLUX DISEASE, UNSPECIFIED WHETHER ESOPHAGITIS PRESENT: ICD-10-CM

## 2024-02-16 DIAGNOSIS — I10 HYPERTENSION, UNSPECIFIED TYPE: ICD-10-CM

## 2024-02-16 DIAGNOSIS — G43.E09 CHRONIC MIGRAINE WITH AURA WITHOUT STATUS MIGRAINOSUS, NOT INTRACTABLE: ICD-10-CM

## 2024-02-16 DIAGNOSIS — E66.9 OBESITY, UNSPECIFIED CLASSIFICATION, UNSPECIFIED OBESITY TYPE, UNSPECIFIED WHETHER SERIOUS COMORBIDITY PRESENT: Primary | ICD-10-CM

## 2024-02-16 DIAGNOSIS — Z71.89 ENCOUNTER FOR PRE-BARIATRIC SURGERY COUNSELING AND EDUCATION: Primary | ICD-10-CM

## 2024-02-16 DIAGNOSIS — Z71.3 ENCOUNTER FOR WEIGHT LOSS COUNSELING: ICD-10-CM

## 2024-02-16 DIAGNOSIS — D35.2 PITUITARY ADENOMA: ICD-10-CM

## 2024-02-16 DIAGNOSIS — E66.01 MORBID OBESITY: ICD-10-CM

## 2024-02-16 DIAGNOSIS — E66.9 OBESITY: Primary | ICD-10-CM

## 2024-02-16 PROCEDURE — 1159F MED LIST DOCD IN RCRD: CPT | Mod: CPTII,,, | Performed by: SURGERY

## 2024-02-16 PROCEDURE — 99204 OFFICE O/P NEW MOD 45 MIN: CPT | Mod: ,,, | Performed by: SURGERY

## 2024-02-16 PROCEDURE — 3074F SYST BP LT 130 MM HG: CPT | Mod: CPTII,,, | Performed by: SURGERY

## 2024-02-16 PROCEDURE — 3079F DIAST BP 80-89 MM HG: CPT | Mod: CPTII,,, | Performed by: SURGERY

## 2024-02-16 PROCEDURE — 3008F BODY MASS INDEX DOCD: CPT | Mod: CPTII,,, | Performed by: SURGERY

## 2024-02-16 RX ORDER — FERROUS SULFATE 325(65) MG
325 TABLET ORAL DAILY
Qty: 90 TABLET | Refills: 3 | Status: SHIPPED | OUTPATIENT
Start: 2024-02-16 | End: 2025-02-10

## 2024-02-16 NOTE — PROGRESS NOTES
"Initial Consult  Ida Saxena  Chief Complaint   Patient presents with    Consult      BARIATRICS - Interested in [facility] ([facility])       History of Present Illness:  Patient is a 38 y.o. female who is referred for evaluation of surgical treatment of morbid obesity. Her Body mass index is 43.47 kg/m². She has known comorbidities of hypertension and ICH, migraines, gerd . She has attended the bariatric seminar and is most interested in gastric sleeve surgery. The patient has had multiple unsuccessful diet attempts in the past without sustained weight loss. With multiple unsuccessful weight loss attempts, the patient believes they are ready for surgical intervention.     Denies any complaints or issues today for visit    Menstrual cycle: normal, regular  Children: 3 and does not want anymore   Occupation: private sitter    Anemia, easy bleeding, clotting disorders, or easy bruisability: YES, Anemic at 9&33 on 12/26/23  Personal or family hx of clotting issues or previous thrombosis: no  Smoking: quit 2021    Ht: 5' 5" (1.651 m)   Weights:   Trend Weights BMI   Starting 261# 43   Pre-Op     2 Week     2 Month     6 Month      1 Year     2 Year               For Adults 20 Years and Older:  BMI Weight Status   Below 18.5 Underweight   18.6-24.9 Normal/Healthy   25.0-29.9 Overweight   30.0 & Above Obese     Ideal Weight Range for Your Height: 5'5" = 114 - 149 lbs     Pertinent History:  HTN - [] Yes   [x] No  HLD - [] Yes   [x] No  DM  -  [] Yes   [x] No  DESTINY - [] Yes   [x] No  GERD - [] Yes   [x] No  Anticoagulation- [] Yes   [x] No    Patient Care Team:  Mary Shields FNP as PCP - General (Family Medicine)  Gorge Kolb MD as Surgeon (Bariatrics)  Liane Salas MD as Consulting Physician (Neurology)     Subjective:     12 point review of systems conducted, negative except as stated in the history of present illness. See HPI for details.    Review of patient's allergies indicates:  No " "Known Allergies  Past Medical History:   Diagnosis Date    Abnormal uterine bleeding     Anemia 2022    Encounter for Papanicolaou smear of cervix 2023    Hypertension     Idiopathic intracranial hypertension     Migraines     Missed period 2023    Papilledema     Pituitary microadenoma      Past Surgical History:   Procedure Laterality Date     SECTION      x 3    DILATION AND CURETTAGE OF UTERUS N/A 2023    Procedure: DILATION AND CURETTAGE, UTERUS fx;  Surgeon: Jose Canales Jr., MD;  Location: [facility];  Service: OB/GYN;  Laterality: N/A;     Family History   Problem Relation Age of Onset    Hypothyroidism Mother     Heart murmur Mother     Cancer Maternal Grandmother      Social History     Tobacco Use    Smoking status: Former     Current packs/day: 0.00     Types: Cigarettes, Vaping with nicotine     Quit date: 2021     Years since quittin.7    Smokeless tobacco: Never   Substance Use Topics    Alcohol use: Yes     Alcohol/week: 2.0 standard drinks of alcohol     Types: 2 Glasses of wine per week     Comment: occassionally    Drug use: Never      Current Outpatient Medications   Medication Instructions    acetaZOLAMIDE (DIAMOX) 500 mg, Oral, 2 times daily    aspirin/salicylamide/caffeine (BC HEADACHE POWDER ORAL) Oral, Daily    blood pressure monitor Kit 1 each, Misc.(Non-Drug; Combo Route), 2 times daily    esomeprazole (NEXIUM) 40 mg, Oral, Daily    ferrous sulfate (FEOSOL) 325 mg, Oral, Daily    rizatriptan (MAXALT) 10 mg, Oral, 2 times daily PRN       Objective:     Vital Signs (Most Recent):  Visit Vitals  /81 (BP Location: Left arm)   Pulse 72   Ht 5' 5" (1.651 m)   Wt 118.5 kg (261 lb 3.2 oz)   BMI 43.47 kg/m²       Physical Exam:  General:  Alert and oriented. Obese  Respiratory:  Lungs are clear to auscultation, Respirations are non-labored, Breath sounds are equal.    Cardiovascular:  Normal rate, Regular rhythm, No murmur.    Gastrointestinal:  Soft, " Non-tender, Non-distended, Normal bowel sounds        Musculoskeletal:  Normal range of motion, Normal strength.    Integumentary:  Warm, Dry, Pink.    Neurologic:  Alert, Oriented.    Psychiatric:  Cooperative.      ASSESSMENT/PLAN:        1. Encounter for pre-bariatric surgery counseling and education        2. Encounter for weight loss counseling        3. Morbid obesity        4. Nontraumatic intracerebral hemorrhage, unspecified cerebral location, unspecified laterality        5. Pituitary adenoma        6. Hypertension, unspecified type        7. Gastroesophageal reflux disease, unspecified whether esophagitis present        8. Chronic migraine with aura without status migrainosus, not intractable            Plan:  Ida Saxena is morbidly obese (Body mass index is 43.47 kg/m².) with significant weight related co-morbidity including: GERD, HTN, migraines, ICH. The patient has been unable to lose weight and improve their co-morbid conditions with medical management including diet and exercise. She understands that this is a tool and lifestyle change will be necessary to keep weight off.     RECOMMENDATION: Weight loss surgery. The risks, benefits, and alternatives, including gastric sleeve surgery (which the patient prefers) were discussed at length and all of their questions were answered. The patient appears to understand and wishes to proceed.    The patient was given the following instructions:  Risk of pregnancy discussed with pt's of child bearing age. Avoid pregnancy up to a year after bariatric procedure to maximize weight loss and avoid subsequent vitamin and mineral deficiencies and/or possible miscarriage due to low caloric intake.   H.Pylori breat test  Once H.Pylori back will need EGD set up  3 months of medically supervised weight loss visits with the dietitian/bariatric team  Need repeat iron panel, ferritin, CBC in three months  Rx of ferrous sulfate sent to pt's pharmacy  Will need  clearance from neurosurgeon, Dr. Brandon Ariza  Needs psychological evaluations and clearance.  Must attend a preoperative bariatric class.  Discussion with pt regarding steroids and NSAIDs post surgery. Will not be able to use these after surgery due to risk of ulceration, perforation, esophagitis, gastritis, bleeding, etc. Pt verbalized understanding.   The patient clearly understood that surgery would only be scheduled if there were no medical or psychiatric contraindications and a second office visit is required.     IJuliana FNP, am scribing for, and in the presence of, Gorge Kolb MD, performed the above scribed service.

## 2024-02-16 NOTE — PROGRESS NOTES
"NUTRITIONAL CONSULT    Initial assessment for sleeve gastrectomy  Non Surgical: []    PAST HISTORY:   Dieting attempts include ... Exercise   Highest adult weight: 261.8#  How many years at present wt:  2years - she started to gain weight after she stopped smoking   Greatest single wt loss: 10#     CLINICAL DATA:  Height:   Ht Readings from Last 1 Encounters:   23 5' 6" (1.676 m)      Weight:   Wt Readings from Last 3 Encounters:   23 2259 118.8 kg (261 lb 14.5 oz)   23 1254 114.3 kg (252 lb)   23 0803 115.6 kg (254 lb 12.8 oz)      IBW: 130 lbs   BMI:   BMI Readings from Last 1 Encounters:   23 42.27 kg/m²      Bariatric goal weight (125% EBW): 163 lbs  Patient's goal weight: 180 lbs    FAMILY HISTORY OF OBESITY:  Yes           WHAT SIDE OF THE FAMILY?   []Mother   []Father   []Sibling   []Child     []Extended    []Adopted       Goal for Bariatric Surgery: to improve health, to improve quality of life, to lose weight, and to prevent future medical conditions    NUTRITION & HEALTH HISTORY:  Greatest challenge: sweets, irregular meal patterns, and emotional eating    Current diet recall:   B: croissant  / cookie and milk   L: meat and rice - fast food   S: popcorn/ candy   D:  meat and rice and green beans      5 can soda per day     Current Dietary Patterns:  Meal pattern: 3  Snackin / day Type:   Vegetables: Likes a few. Eats almost daily.  Fruits: Likes a variety. Eats 2-3 times per week.  Beverages: water, soda, and sugary beverages  Alcohol consumption: Daily. Type: every other day she might do a shot and daraqui on the weekend   Dining out: Weekly. Mostly fast food, restaurants, and take-out.  Grocery shopping and food prep: Yes[x]Self   []Spouse   []Other:   Emotional eating: Yes Which emotions if yes: stress   Nighttime snacking: No Middle of night: No Before bedtime: No  Hx of disordered eating behaviors: No Anorexia: No Bulimia: No Purging: No Binging:No  History of " vitamin/mineral deficiencies:   No    Vitamins / Minerals / Herbs:   None     Food Allergies:   None       ASSESSMENT:  Patient reports attempts at weight loss, only to regain lost weight.  Patient demonstrated knowledge of healthy eating behaviors and exercise patterns; admits to not eating healthy and not exercising at this point.  Patient states willingness to change lifestyle and make behavior modifications.  Expect good  compliance after surgery at this time.        ESTIMATED NEEDS:  Calories: 5265-9348 (20-25 kcal/kg adjusted BW/d)  Protein: 74-82 (1.0-1.1 g/kg adjusted BW/d)  Fluid:  1480 (20 mL/kg actual BW/d)    BARIATRIC DIET DISCUSSION:  Discussed diet after surgery and related to patients food record.  Reviewed diet progression before and after surgery.  Reinforced that surgery is not a magic bullet and importance of low fat foods and no snacking.  Answered all questions.    RECOMMENDATIONS:  Patient is a good candidate for bariatric surgery.      PLAN:  3 meals per day   3-4oz protein each meal   Limit starches to 1/3 c starch   Decrease soda/ sugar beverages   Increase water   Read bariatric bookelt

## 2024-02-16 NOTE — PROGRESS NOTES
BEHAVIOR MODIFICATION AND EXERCISE CONSULT    PERSONAL:     What initiated your interest in bariatric surgery?  health    Marital Status: [x]Single   []   []   []      Do you have children? 3 (21,17,16)    What is your highest level of education completed? 12th grade    Who is your social or relational support? family    Do you work? 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 yes    Were there any particular events that lead to significant weight gain? [x]Loss of a loved one  []Pregnancy  []Trauma, accident, illness  []Loss of employment [x]Other Stress    PHYSICAL ACTIVITY:    Do you currently exercise: []Yes   [x]No    If so, please describe:    Have you experienced any injuries and/or restrictions that may limit your physical activity? []Yes   [x]No    If so, please describe:     BEHAVIORS:    What behavior(s) would you like to change in order to be healthy? Eating habits, exercise    On a scale of 1-10 (1-extremely low, 10-extremely high), how motivated are you to change your behavior(s)? 10    Do you currently use any type of tobacco products (vape, dip, cigarettes, etc.) No    If yes, on average, how many and/or how often do you use these products on a daily basis?    How many hours of sleep do you average?     Rate your stress level on a scale of 1-10 (1-extremely low, 10-extremely high)     What is your biggest stressor? Health and kids    Is your appetite affected by stress, boredom, depression, etc.? yes    How do you cope and/or manage stress? Being alone    Have you ever seen a counselor or therapist? no      CURRENT WEIGHT: 261.7  HIGHEST WEIGHT: 261.7  LOWEST ADULT WEIGHT: 185    WAIST/HIP: 43/54.5    HANDOUTS: Emotional connections to food and stress management.    NOTES:A body composition was conducted and patient was educated on results       Yue Cao, FORTINO, CPT, CHC

## 2024-02-19 ENCOUNTER — TELEPHONE (OUTPATIENT)
Dept: SURGERY | Facility: CLINIC | Age: 39
End: 2024-02-19
Payer: MEDICAID

## 2024-02-19 NOTE — TELEPHONE ENCOUNTER
----- Message from CAIN Miller sent at 2/16/2024 12:20 PM CST -----  Regarding: medicaid bcon  Girls,   1. Repeat iron panel, ferritin, and CBC in 3 months  H&H was 9&33, I sent her a script for ferrous sulfate to take daily    Ronaldo,  1. I forgot to tell you   Will need clearance from neurosurgeon, Dr. Brandon Ariza because she has a puitary adenoma

## 2024-02-26 ENCOUNTER — TELEPHONE (OUTPATIENT)
Dept: SURGERY | Facility: CLINIC | Age: 39
End: 2024-02-26
Payer: MEDICAID

## 2024-02-27 ENCOUNTER — LAB VISIT (OUTPATIENT)
Dept: LAB | Facility: HOSPITAL | Age: 39
End: 2024-02-27
Attending: NURSE PRACTITIONER
Payer: MEDICAID

## 2024-02-27 DIAGNOSIS — I10 PRIMARY HYPERTENSION: ICD-10-CM

## 2024-02-27 LAB
ALBUMIN SERPL-MCNC: 3.4 G/DL (ref 3.5–5)
ALBUMIN/GLOB SERPL: 0.9 RATIO (ref 1.1–2)
ALP SERPL-CCNC: 83 UNIT/L (ref 40–150)
ALT SERPL-CCNC: 13 UNIT/L (ref 0–55)
AST SERPL-CCNC: 14 UNIT/L (ref 5–34)
BASOPHILS # BLD AUTO: 0.03 X10(3)/MCL
BASOPHILS NFR BLD AUTO: 0.6 %
BILIRUB SERPL-MCNC: 0.2 MG/DL
BUN SERPL-MCNC: 10.9 MG/DL (ref 7–18.7)
CALCIUM SERPL-MCNC: 9.3 MG/DL (ref 8.4–10.2)
CHLORIDE SERPL-SCNC: 113 MMOL/L (ref 98–107)
CO2 SERPL-SCNC: 20 MMOL/L (ref 22–29)
CREAT SERPL-MCNC: 0.72 MG/DL (ref 0.55–1.02)
EOSINOPHIL # BLD AUTO: 0.12 X10(3)/MCL (ref 0–0.9)
EOSINOPHIL NFR BLD AUTO: 2.5 %
ERYTHROCYTE [DISTWIDTH] IN BLOOD BY AUTOMATED COUNT: 15.3 % (ref 11.5–17)
GFR SERPLBLD CREATININE-BSD FMLA CKD-EPI: >60 MLS/MIN/1.73/M2
GLOBULIN SER-MCNC: 3.6 GM/DL (ref 2.4–3.5)
GLUCOSE SERPL-MCNC: 101 MG/DL (ref 74–100)
HCT VFR BLD AUTO: 36.3 % (ref 37–47)
HGB BLD-MCNC: 11.2 G/DL (ref 12–16)
IMM GRANULOCYTES # BLD AUTO: 0.02 X10(3)/MCL (ref 0–0.04)
IMM GRANULOCYTES NFR BLD AUTO: 0.4 %
LYMPHOCYTES # BLD AUTO: 1.48 X10(3)/MCL (ref 0.6–4.6)
LYMPHOCYTES NFR BLD AUTO: 31.1 %
MCH RBC QN AUTO: 25.1 PG (ref 27–31)
MCHC RBC AUTO-ENTMCNC: 30.9 G/DL (ref 33–36)
MCV RBC AUTO: 81.4 FL (ref 80–94)
MONOCYTES # BLD AUTO: 0.46 X10(3)/MCL (ref 0.1–1.3)
MONOCYTES NFR BLD AUTO: 9.7 %
NEUTROPHILS # BLD AUTO: 2.65 X10(3)/MCL (ref 2.1–9.2)
NEUTROPHILS NFR BLD AUTO: 55.7 %
NRBC BLD AUTO-RTO: 0 %
PLATELET # BLD AUTO: 249 X10(3)/MCL (ref 130–400)
PMV BLD AUTO: 9 FL (ref 7.4–10.4)
POTASSIUM SERPL-SCNC: 3.9 MMOL/L (ref 3.5–5.1)
PROT SERPL-MCNC: 7 GM/DL (ref 6.4–8.3)
RBC # BLD AUTO: 4.46 X10(6)/MCL (ref 4.2–5.4)
SODIUM SERPL-SCNC: 139 MMOL/L (ref 136–145)
WBC # SPEC AUTO: 4.76 X10(3)/MCL (ref 4.5–11.5)

## 2024-02-27 PROCEDURE — 80053 COMPREHEN METABOLIC PANEL: CPT

## 2024-02-27 PROCEDURE — 85025 COMPLETE CBC W/AUTO DIFF WBC: CPT

## 2024-02-27 PROCEDURE — 36415 COLL VENOUS BLD VENIPUNCTURE: CPT

## 2024-03-07 ENCOUNTER — OFFICE VISIT (OUTPATIENT)
Dept: INTERNAL MEDICINE | Facility: CLINIC | Age: 39
End: 2024-03-07
Payer: MEDICAID

## 2024-03-07 ENCOUNTER — LAB VISIT (OUTPATIENT)
Dept: LAB | Facility: HOSPITAL | Age: 39
End: 2024-03-07
Attending: NURSE PRACTITIONER
Payer: MEDICAID

## 2024-03-07 VITALS
TEMPERATURE: 99 F | HEART RATE: 85 BPM | SYSTOLIC BLOOD PRESSURE: 111 MMHG | WEIGHT: 274 LBS | HEIGHT: 65 IN | RESPIRATION RATE: 18 BRPM | DIASTOLIC BLOOD PRESSURE: 78 MMHG | BODY MASS INDEX: 45.65 KG/M2

## 2024-03-07 DIAGNOSIS — I10 PRIMARY HYPERTENSION: Primary | Chronic | ICD-10-CM

## 2024-03-07 DIAGNOSIS — Z01.818 PRE-OPERATIVE CLEARANCE: ICD-10-CM

## 2024-03-07 DIAGNOSIS — G93.2 IIH (IDIOPATHIC INTRACRANIAL HYPERTENSION): Chronic | ICD-10-CM

## 2024-03-07 DIAGNOSIS — E66.01 CLASS 3 SEVERE OBESITY DUE TO EXCESS CALORIES WITH SERIOUS COMORBIDITY AND BODY MASS INDEX (BMI) OF 45.0 TO 49.9 IN ADULT: Chronic | ICD-10-CM

## 2024-03-07 LAB
EST. AVERAGE GLUCOSE BLD GHB EST-MCNC: 96.8 MG/DL
HBA1C MFR BLD: 5 %

## 2024-03-07 PROCEDURE — 3078F DIAST BP <80 MM HG: CPT | Mod: CPTII,,, | Performed by: NURSE PRACTITIONER

## 2024-03-07 PROCEDURE — 1160F RVW MEDS BY RX/DR IN RCRD: CPT | Mod: CPTII,,, | Performed by: NURSE PRACTITIONER

## 2024-03-07 PROCEDURE — 99214 OFFICE O/P EST MOD 30 MIN: CPT | Mod: S$PBB,,, | Performed by: NURSE PRACTITIONER

## 2024-03-07 PROCEDURE — 1159F MED LIST DOCD IN RCRD: CPT | Mod: CPTII,,, | Performed by: NURSE PRACTITIONER

## 2024-03-07 PROCEDURE — 3074F SYST BP LT 130 MM HG: CPT | Mod: CPTII,,, | Performed by: NURSE PRACTITIONER

## 2024-03-07 PROCEDURE — 36415 COLL VENOUS BLD VENIPUNCTURE: CPT

## 2024-03-07 PROCEDURE — 3044F HG A1C LEVEL LT 7.0%: CPT | Mod: CPTII,,, | Performed by: NURSE PRACTITIONER

## 2024-03-07 PROCEDURE — 3008F BODY MASS INDEX DOCD: CPT | Mod: CPTII,,, | Performed by: NURSE PRACTITIONER

## 2024-03-07 PROCEDURE — 99214 OFFICE O/P EST MOD 30 MIN: CPT | Mod: PBBFAC | Performed by: NURSE PRACTITIONER

## 2024-03-07 PROCEDURE — 83036 HEMOGLOBIN GLYCOSYLATED A1C: CPT

## 2024-03-08 ENCOUNTER — OFFICE VISIT (OUTPATIENT)
Dept: INTERNAL MEDICINE | Facility: CLINIC | Age: 39
End: 2024-03-08
Payer: MEDICAID

## 2024-03-08 ENCOUNTER — HOSPITAL ENCOUNTER (OUTPATIENT)
Dept: CARDIOLOGY | Facility: HOSPITAL | Age: 39
Discharge: HOME | End: 2024-03-08
Attending: NURSE PRACTITIONER
Payer: MEDICAID

## 2024-03-08 ENCOUNTER — HOSPITAL ENCOUNTER (OUTPATIENT)
Dept: RADIOLOGY | Facility: HOSPITAL | Age: 39
Discharge: HOME | End: 2024-03-08
Attending: NURSE PRACTITIONER
Payer: MEDICAID

## 2024-03-08 ENCOUNTER — CLINICAL SUPPORT (OUTPATIENT)
Dept: BARIATRICS | Facility: HOSPITAL | Age: 39
End: 2024-03-08
Payer: MEDICAID

## 2024-03-08 VITALS — BODY MASS INDEX: 44.23 KG/M2 | WEIGHT: 265.81 LBS

## 2024-03-08 VITALS
BODY MASS INDEX: 45.48 KG/M2 | OXYGEN SATURATION: 100 % | DIASTOLIC BLOOD PRESSURE: 79 MMHG | WEIGHT: 273 LBS | HEIGHT: 65 IN | HEART RATE: 78 BPM | SYSTOLIC BLOOD PRESSURE: 121 MMHG | RESPIRATION RATE: 18 BRPM | TEMPERATURE: 98 F

## 2024-03-08 DIAGNOSIS — K56.609 PEPTIC ULCER WITHOUT HEMORRHAGE OR PERFORATION BUT WITH OBSTRUCTION: Primary | ICD-10-CM

## 2024-03-08 DIAGNOSIS — E66.9 OBESITY, UNSPECIFIED CLASSIFICATION, UNSPECIFIED OBESITY TYPE, UNSPECIFIED WHETHER SERIOUS COMORBIDITY PRESENT: Primary | ICD-10-CM

## 2024-03-08 DIAGNOSIS — K27.9 PEPTIC ULCER WITHOUT HEMORRHAGE OR PERFORATION BUT WITH OBSTRUCTION: Primary | ICD-10-CM

## 2024-03-08 DIAGNOSIS — Z01.818 PRE-OPERATIVE CLEARANCE: ICD-10-CM

## 2024-03-08 DIAGNOSIS — Z01.818 PRE-OPERATIVE CLEARANCE: Primary | ICD-10-CM

## 2024-03-08 PROCEDURE — 93005 ELECTROCARDIOGRAM TRACING: CPT

## 2024-03-08 PROCEDURE — 71046 X-RAY EXAM CHEST 2 VIEWS: CPT | Mod: TC

## 2024-03-08 PROCEDURE — 94200034 HC BARIATRIC NUTRITIONAL SVCS PT GRADE I

## 2024-03-08 PROCEDURE — 99213 OFFICE O/P EST LOW 20 MIN: CPT | Mod: PBBFAC,25

## 2024-03-08 NOTE — PROGRESS NOTES
"Patient Education [x] [facility] Visit Number:  1/3     []  Pre-op Wt Loss Goal (as ordered on referral):   [] [facility] Visit:     Height:   Ht Readings from Last 1 Encounters:   03/08/24 5' 5" (1.651 m)      Weight:   Wt Readings from Last 3 Encounters:   03/08/24 0853 120.6 kg (265 lb 12.8 oz)   03/08/24 0752 123.8 kg (273 lb)   03/07/24 0721 124.3 kg (274 lb)      BMI:   BMI Readings from Last 1 Encounters:   03/08/24 44.23 kg/m²                                                                          Barriers to learning:  []None evident  []Acuity of illness  []Cognitive defects  []Cultural barriers  []Desire/Motivation  []Difficulty concentrating  []Emotional state  []Financial concerns  []Hearing deficit  []Language barrier  []Literacy  []Memory problems  []Vision impairment     Home caregiver present for session   []YES  [] NO       Teaching methods:  []Demonstration  [x]Explanation  [x]Printed materials  []Teach back  []Virtual/web based         Verbalizes understanding Demonstrates  Needs further teaching Needs practice/ supervision Comments    Bariatric Surgery Diet  [] [] [] []    Clear liquid [] [] [] []    Full liquid [] [] [] []    Weight Reduction [] [] [] []    Other Diet [] [] [] []          Additional Learner (s) Present                                                                  []Spouse   []Daughter    []  Son  []Family member   []Friend   []Grandfather   []Grandmother   []Father   []Mother   []Other       Expected Compliance:  [x]Good  []Fair  []Poor      This is Pt first visit since her consult. She states she is watching her portions and trying to eat more vegetables. She has limited soda to 1 per day. Pt is walking 1 hr per day while her kids are at sports     24 hr recall:  B:  eggs and toast   L: meat and rice  string beans  D:  meat and vegetables       Plan:  Swap to diet juice   Limit starches to only 2 meals per day and 1/3 cup each meal   Continue to walk for 1 hrs   Add a snack from the " snack list at 3pm

## 2024-03-08 NOTE — PROGRESS NOTES
"[facility] INTERNAL MEDICINE  OUTPATIENT OFFICE VISIT NOTE    SUBJECTIVE:      HPI: Ida Saxena is a 38 y.o. female w/ PMHx of obesity class 3, Vitamin Deficiency, papilledema of bilateral eyes, venous insufficiency, iron-deficiency and hypertension who presents to [facility] Pre-op clinic for risk assessment for planned Lap Vertical Sleeve  gastrectomy.     Patient recently switched from HCTZ 25 mg daily to acetazolamide 500 mg ER daily; BP today 121/79.    Patient denies any exertional chest pain, dyspnea on exertion, history of vaso-occlusive type 2 diabetes, and hyperlipidemia.  She endorses ability to climb flight of stairs without shortness of breath.  Her bilateral leg edema is best 1st thing in the morning, worsened with prolonged standing throughout day notably relieved with leg elevation.  Patient additionally endorses wearing compression stockings percutaneous relief.      Social history:  Former smoker, quit date 2022, 0.5 packs daily 20 years (10 pack-year history).  Occasional use with alcohol.    Denies any illicit drug use.    ROS:  (+)  (-) Chest pain, palpitations, SOB, dizziness, syncope, edema, PND, orthopnea, claudication, cough, fever, chills, abdominal pain, vomiting, diarrhea, dysuria, bleeding    OBJECTIVE:     Vital signs:   /79 (BP Location: Left arm, Patient Position: Sitting, BP Method: Medium (Automatic))   Pulse 78   Temp 98.2 °F (36.8 °C) (Oral)   Resp 18   Ht 5' 5" (1.651 m)   Wt 123.8 kg (273 lb)   LMP 02/10/2024 (Exact Date)   SpO2 100%   BMI 45.43 kg/m²      Physical Examination:  General:  Well-nourished, well-developed, no acute distress  HEENT: Normocephalic, atraumatic. EOMI.  MMM  Neck: Supple. No obvious JVD.  Normal range of motion.  Respiratory: CTAB.  No obvious crackles wheeze or rhonchi.  Cardiovascular:  RRR.  No obvious M/G/R. Warm extremities.  Peripheral pulses intact.  No edema.  Gastrointestinal:  Soft, nontender, nondistended.  Normal bowel " sounds.  Neurologic: ANOx3.  Answering questions/following commands appropriately.  No FND      Current Outpatient Medications:     acetaZOLAMIDE (DIAMOX) 500 mg CpSR, Take 1 capsule (500 mg total) by mouth 2 (two) times daily., Disp: 60 capsule, Rfl: 2    ferrous sulfate (FEOSOL) 325 mg (65 mg iron) Tab tablet, Take 1 tablet (325 mg total) by mouth once daily., Disp: 90 tablet, Rfl: 3    aspirin/salicylamide/caffeine (BC HEADACHE POWDER ORAL), Take by mouth once daily., Disp: , Rfl:     blood pressure monitor Kit, 1 each by Misc.(Non-Drug; Combo Route) route 2 (two) times a day. (Patient not taking: Reported on 2/16/2024), Disp: 1 each, Rfl: 0    esomeprazole (NEXIUM) 40 MG capsule, Take 1 capsule (40 mg total) by mouth once daily. (Patient not taking: Reported on 2/16/2024), Disp: 60 capsule, Rfl: 0    rizatriptan (MAXALT) 10 MG tablet, Take 1 tablet (10 mg total) by mouth 2 (two) times daily as needed for Migraine. (Patient not taking: Reported on 3/8/2024), Disp: 9 tablet, Rfl: 4     ASSESSMENT & PLAN:     Preoperative risk assessment  Obesity class 3  -Patient is a moderate risk patient for a moderate risk procedure  -VSS; most recent labs reviewed, within normal limits  -METS 4+, denies any chest pain or SOB with exertion  -risk factors:  Hypertension, obesity  -currently any antiplatelet/anticoagulation therapies    Hypertension   Periorbital edema   IH   Iron-deficiency   Vitamin-D deficiency   -Current medication regimen:  Acetazolamide 500 mg ER daily and ferrous sulfate 325 mg daily  -conditions currently stable, well-controlled    Derrek Chu MD  Internal Medicine - PGY-2

## 2024-03-11 LAB
OHS QRS DURATION: 82 MS
OHS QTC CALCULATION: 455 MS

## 2024-03-26 DIAGNOSIS — G93.2 IIH (IDIOPATHIC INTRACRANIAL HYPERTENSION): ICD-10-CM

## 2024-03-26 DIAGNOSIS — G43.109 MIGRAINE WITH AURA AND WITHOUT STATUS MIGRAINOSUS, NOT INTRACTABLE: ICD-10-CM

## 2024-03-26 RX ORDER — ACETAZOLAMIDE 500 MG/1
500 CAPSULE, EXTENDED RELEASE ORAL 2 TIMES DAILY
Qty: 60 CAPSULE | Refills: 2 | Status: SHIPPED | OUTPATIENT
Start: 2024-03-26 | End: 2024-04-03 | Stop reason: SDUPTHER

## 2024-03-26 RX ORDER — RIZATRIPTAN BENZOATE 10 MG/1
10 TABLET ORAL 2 TIMES DAILY PRN
Qty: 9 TABLET | Refills: 4 | Status: SHIPPED | OUTPATIENT
Start: 2024-03-26 | End: 2024-04-03 | Stop reason: SDUPTHER

## 2024-03-26 NOTE — TELEPHONE ENCOUNTER
03/26/2024-@ 11:00 spoke to Ms Saxena and she stated she had a family emergency and had to cancel appointment for today. Confirmed next opening is in July, but can be placed on cancellation list. Explained-our medical office assistants will be calling to confirm patient appointments for next week and if someone cancels, then can move up her appointment.Per Ms Saxena she will run out of medication. Explained will propose refills to Dr Salas and will be sent to pharmacy electronically.    Ms Saxena verbalized understanding and confirmed phone number for Medical Office staff to contact with appointment.     ----- Message from Mickey Chang sent at 3/26/2024 10:38 AM CDT -----  Regarding: Missed appt  Pt is calling regarding missed appt on today, pt request to be r/s but says she needs a sooner appt. Did advise pt she could be scheduled and added to waitlist.Pt denied. Pt requesting a call back from nurse [phone]

## 2024-04-01 NOTE — PROGRESS NOTES
[facility] Neurology Follow Up Office Visit Note    Initial Visit Date: 9/14/2023  Last Visit Date: 11/20/2023  Current Visit Date:  04/01/2024    Chief Complaint:     Chief Complaint   Patient presents with    Migraine     Pt states 3 per week lasting maybe 10 minutes, Tylenol helps       History of Present Illness:      This is 39 y.o. female with history of obesity, pituitary microadenoma, hypertension, who is referred for IIH and episodic migraine without aura.  During last visit, patient was pending PSG and bariatric evaluation. Rizatriptan 10 mg twice a day as needed was started. Gained weight since last visit.     Age of Onset : 17 years old      Headache Description:   bilateral occipital, temporal, stabbing, severe, impeding day to day activity, lasting 24 hours, without nausea, with photophobia and phonophobia  Bitemporal, stabbing, mild to moderate, not impeding day to day activity, lasting 24 hours, without nausea, without photophobia and phonophobia     Frequency: 8 migraine headache days per month     Provocation Factors: dietary     Risk Factors  - Family history of headache disorder: Yes maternal grandmother, mother, and daughter with headache  - History of focal CNS lesions: Yes pituitary microadenoma.  - History of CNS infections: No  - History head trauma: No  - History of underlying mood disorder: Yes mood swings   - History of sleep disorder: Yes frequent night awakening. Wake up tired. Dozes off while driving. Dozes off while watching TV. + Snores. Gained weight. PSG pending.   - Recreational drug use: No  - Tobacco use: Yes 0.5 ppd. Quit 8 months ago.   - Alcohol use: Yes occasional  - Weight fluctuation: Yes obesity. Gaining weight. Starting weight 252 lbs. Now 274 lb.  Pending bariatric surgery evaluation start of 2024.  - Isotretinoin or Tetracycline use:  No  - Family planning and contraceptive use: Actively trying to conceive.    Medications:     Current Prophylactic  Acetazolamide 500 mg twice  "a day (10/2/2023 to present): paresthesia.       Current Abortive  Rizatriptan 10 mg twice a day as needed (11/20/2023 to present)   Tylenol PRN: ineffective.     Prior Prophylactic  Topiramate 50 mg at bedtime (9/14/2023 to 9/24/2023): fatigue. Ineffective.     Prior Abortive  BC powder: daily for the past "few years." Takes 2 packet per day.   Tylenol PRN: every other day for the past few years.   Ibuprofen PRN: every other day for the past few years.    Sumatriptan 50 mg twice a day as needed (3/28/2023 to 9/14/2023): ineffective.    Devices:     - VNS:  - TNS  - TMS:     Procedures:     - Botox:  - PSG block:   - Occipital nerve block:     Labs:     Results for orders placed or performed during the hospital encounter of 03/08/24   SCHEDULED EKG 12-LEAD (to Muse)   Result Value Ref Range    QRS Duration 82 ms    OHS QTC Calculation 455 ms       Studies:     - MRI Brain +/- Amol 11/22/2022:  I have reviewed the study independently and with the patient. Metallic artifact. Subtle posterior globe flattening OU.   - MRV Head w/o Amol 9/26/2023:  I have reviewed the study independently and with the patient. Unremarkable.    - Lumbar Puncture 9/28/2023: Opening pressure: 37 cm CSF. Closing pressure: 26 cm CSF    Review of Systems:     Review of Systems   All other systems reviewed and are negative.      Physical Exams:     Vitals:    04/03/24 1308   BP: 117/82   Pulse: 79   Resp: 16   Temp: 98.1 °F (36.7 °C)         Physical Exam  Vitals and nursing note reviewed.   Constitutional:       Appearance: Normal appearance.   HENT:      Head: Normocephalic and atraumatic.      Comments: Hagen III     Nose: Nose normal.      Mouth/Throat:      Mouth: Mucous membranes are moist.      Pharynx: Oropharynx is clear.   Eyes:      Conjunctiva/sclera: Conjunctivae normal.   Cardiovascular:      Rate and Rhythm: Normal rate and regular rhythm.      Pulses: Normal pulses.   Pulmonary:      Effort: Pulmonary effort is normal.      " Breath sounds: Normal breath sounds.   Abdominal:      General: Abdomen is flat.   Musculoskeletal:         General: Normal range of motion.      Cervical back: Normal range of motion.   Skin:     General: Skin is warm.   Neurological:      Mental Status: She is alert.       Comprehensive Neurological Exam:  Mental Status: Alert Oriented to Self, Date, and Place. Comprehension wnl. No dysarthria.   CN II - XII: EZRA, No APD, No Papilledema  VFFC, No ptosis OU, EOMI without nystagmus LT/Temp symmetric in CN V1-3 distribution, Hearing grossly intact, Face Symmetric, Tongue and Uvula midline, Trapezius symmetric bilateral.   Motor: tone and bulk wnl throughout, no abnormal involuntary or voluntary movements, 5/5 to confrontation, Fine finger movements wnl b/l, No pronator drift.   Sensory: LT, Proprioception, Vibration, PP, Temp symmetric.  Reflexes: 2+ throughout  Cerebellar: FNF wnl b/l, RAHM wnl b/l  Romberg: Negative  Gait: normal, toe gait within normal limits, tandem gait wnl.       Assessment:     This is 39 y.o. female with history of obesity, pituitary microadenoma, hypertension, who is referred for IIH and episodic migraine without aura.     Problem List Items Addressed This Visit          Neuro    IIH (idiopathic intracranial hypertension) - Primary (Chronic)    Relevant Medications    acetaZOLAMIDE (DIAMOX) 500 mg CpSR    rizatriptan (MAXALT) 10 MG tablet    Migraine with aura and without status migrainosus, not intractable    Relevant Medications    rizatriptan (MAXALT) 10 MG tablet         Plan:     [] PSG   [] follow up with Ophthalmology for serial eye exam   [] weight loss and IIH remission discussed with patient: would recommend losing approximately 10% of starting weight ( 225 lb goal weight) for IIH to be in remission.   [] continue with Acetazolamide 500 mg twice a day  [] continue with Rizatriptan 10 mg twice a day as needed      RTC 6 months in clinic    Visit today is associated with current or  anticipated ongoing medical care related to this patient's single serious condition/complex condition as documented above.       Headache education provided: good sleep hygiene and 7 hours of sleep per night, stress management, medication overuse education provided. Using more 3 OTC per week may worsen headaches, high intensity interval training has shown to reduce headache frequency. Low carb, high protein has shown to reduce headache frequency. Patient is instructed in keep headache diary.     I have explained the treatment plan, diagnosis, and prognosis to patient. All questions are answered to the best of my knowledge.     Face to face time 40 minutes, including documentation, chart review, counseling, education, review of test results, relevant medical records, and coordination of care.       Liane Salas MD   General Neurology  04/03/2024

## 2024-04-03 ENCOUNTER — OFFICE VISIT (OUTPATIENT)
Dept: NEUROLOGY | Facility: CLINIC | Age: 39
End: 2024-04-03
Payer: MEDICAID

## 2024-04-03 VITALS
WEIGHT: 280 LBS | RESPIRATION RATE: 16 BRPM | SYSTOLIC BLOOD PRESSURE: 117 MMHG | TEMPERATURE: 98 F | HEART RATE: 79 BPM | HEIGHT: 65 IN | DIASTOLIC BLOOD PRESSURE: 82 MMHG | BODY MASS INDEX: 46.65 KG/M2 | OXYGEN SATURATION: 99 %

## 2024-04-03 DIAGNOSIS — G43.109 MIGRAINE WITH AURA AND WITHOUT STATUS MIGRAINOSUS, NOT INTRACTABLE: ICD-10-CM

## 2024-04-03 DIAGNOSIS — G93.2 IIH (IDIOPATHIC INTRACRANIAL HYPERTENSION): Primary | ICD-10-CM

## 2024-04-03 PROCEDURE — G2211 COMPLEX E/M VISIT ADD ON: HCPCS | Mod: S$PBB,,, | Performed by: PSYCHIATRY & NEUROLOGY

## 2024-04-03 PROCEDURE — 3079F DIAST BP 80-89 MM HG: CPT | Mod: CPTII,,, | Performed by: PSYCHIATRY & NEUROLOGY

## 2024-04-03 PROCEDURE — 3008F BODY MASS INDEX DOCD: CPT | Mod: CPTII,,, | Performed by: PSYCHIATRY & NEUROLOGY

## 2024-04-03 PROCEDURE — 3074F SYST BP LT 130 MM HG: CPT | Mod: CPTII,,, | Performed by: PSYCHIATRY & NEUROLOGY

## 2024-04-03 PROCEDURE — 99213 OFFICE O/P EST LOW 20 MIN: CPT | Mod: PBBFAC | Performed by: PSYCHIATRY & NEUROLOGY

## 2024-04-03 PROCEDURE — 99215 OFFICE O/P EST HI 40 MIN: CPT | Mod: S$PBB,,, | Performed by: PSYCHIATRY & NEUROLOGY

## 2024-04-03 PROCEDURE — 1159F MED LIST DOCD IN RCRD: CPT | Mod: CPTII,,, | Performed by: PSYCHIATRY & NEUROLOGY

## 2024-04-03 PROCEDURE — 3044F HG A1C LEVEL LT 7.0%: CPT | Mod: CPTII,,, | Performed by: PSYCHIATRY & NEUROLOGY

## 2024-04-03 RX ORDER — ACETAZOLAMIDE 500 MG/1
500 CAPSULE, EXTENDED RELEASE ORAL 2 TIMES DAILY
Qty: 60 CAPSULE | Refills: 3 | Status: SHIPPED | OUTPATIENT
Start: 2024-04-03 | End: 2024-06-17 | Stop reason: SDUPTHER

## 2024-04-03 RX ORDER — RIZATRIPTAN BENZOATE 10 MG/1
10 TABLET ORAL 2 TIMES DAILY PRN
Qty: 9 TABLET | Refills: 4 | Status: SHIPPED | OUTPATIENT
Start: 2024-04-03 | End: 2024-05-03

## 2024-04-03 RX ORDER — ACETAMINOPHEN 500 MG
1000 TABLET ORAL EVERY 6 HOURS PRN
COMMUNITY

## 2024-04-03 NOTE — LETTER
April 3, 2024      [facility]  [address] [facility] [address]  Phone: [phone]       Patient: Ida Saxena   YOB: 1985  Date of Visit: 04/03/2024    To Whom It May Concern:    Danial Saxena  was at [facility] on 04/03/2024. The patient may return to work on 04/03/2024 with no restrictions. If you have any questions or concerns, or if I can be of further assistance, please do not hesitate to contact me.    Sincerely,    Soheila Javier LPN

## 2024-04-05 ENCOUNTER — CLINICAL SUPPORT (OUTPATIENT)
Dept: BARIATRICS | Facility: HOSPITAL | Age: 39
End: 2024-04-05

## 2024-04-05 VITALS — WEIGHT: 270 LBS | BODY MASS INDEX: 44.93 KG/M2

## 2024-04-05 DIAGNOSIS — E66.9 OBESITY, UNSPECIFIED CLASSIFICATION, UNSPECIFIED OBESITY TYPE, UNSPECIFIED WHETHER SERIOUS COMORBIDITY PRESENT: Primary | ICD-10-CM

## 2024-04-05 PROCEDURE — 94200034 HC BARIATRIC NUTRITIONAL SVCS PT GRADE I

## 2024-04-05 NOTE — PROGRESS NOTES
"Patient Education [x] [facility] Visit Number:  2/3     []  Pre-op Wt Loss Goal (as ordered on referral):   [] [facility] Visit:     Height:   Ht Readings from Last 1 Encounters:   04/03/24 5' 5" (1.651 m)      Weight:   Wt Readings from Last 3 Encounters:   04/05/24 0813 122.5 kg (270 lb)   04/03/24 1308 127 kg (279 lb 15.8 oz)   03/08/24 0853 120.6 kg (265 lb 12.8 oz)      BMI:   BMI Readings from Last 1 Encounters:   04/05/24 44.93 kg/m²                                                                          Barriers to learning:  [x]None evident  []Acuity of illness  []Cognitive defects  []Cultural barriers  []Desire/Motivation  []Difficulty concentrating  []Emotional state  []Financial concerns  []Hearing deficit  []Language barrier  []Literacy  []Memory problems  []Vision impairment     Home caregiver present for session   []YES  [x] NO       Teaching methods:  []Demonstration  []Explanation  []Printed materials  []Teach back  []Virtual/web based         Verbalizes understanding Demonstrates  Needs further teaching Needs practice/ supervision Comments    Bariatric Surgery Diet  [] [] [] []    Clear liquid [] [] [] []    Full liquid [] [] [] []    Weight Reduction [] [] [] []    Other Diet [] [] [] []          Additional Learner (s) Present                                                                  []Spouse   []Daughter    []  Son  []Family member   []Friend   []Grandfather   []Grandmother   []Father   []Mother   []Other       Expected Compliance:  [x]Good  []Fair  []Poor    Additional Information:    Pt states that she is practicing bariatric eating habits. Walking 1hr 4x per week at [facility]. Still consuming high calorie beverages but will work on decreasing those and increasing water. She is watching portions and limiting starch from dinner, will work her way toward 1/2 cup starch       24 hr recall:  Eggs and sausage   Smothered chicken and string beans and salad   S: chips   Pork steak and rice "     Plan:  Limit juice and increase water   Add high protein snack between lunch and dinner

## 2024-04-26 ENCOUNTER — TELEPHONE (OUTPATIENT)
Dept: GYNECOLOGY | Facility: CLINIC | Age: 39
End: 2024-04-26

## 2024-04-26 ENCOUNTER — LAB VISIT (OUTPATIENT)
Dept: LAB | Facility: HOSPITAL | Age: 39
End: 2024-04-26
Payer: MEDICAID

## 2024-04-26 ENCOUNTER — OFFICE VISIT (OUTPATIENT)
Dept: GYNECOLOGY | Facility: CLINIC | Age: 39
End: 2024-04-26
Payer: MEDICAID

## 2024-04-26 VITALS
DIASTOLIC BLOOD PRESSURE: 72 MMHG | RESPIRATION RATE: 18 BRPM | SYSTOLIC BLOOD PRESSURE: 117 MMHG | WEIGHT: 272 LBS | BODY MASS INDEX: 42.69 KG/M2 | HEIGHT: 67 IN | TEMPERATURE: 98 F | OXYGEN SATURATION: 100 %

## 2024-04-26 DIAGNOSIS — N76.0 BACTERIAL VAGINOSIS: Primary | ICD-10-CM

## 2024-04-26 DIAGNOSIS — N93.9 ABNORMAL UTERINE BLEEDING: ICD-10-CM

## 2024-04-26 DIAGNOSIS — B96.89 BACTERIAL VAGINOSIS: Primary | ICD-10-CM

## 2024-04-26 DIAGNOSIS — Z11.3 SCREENING FOR STD (SEXUALLY TRANSMITTED DISEASE): ICD-10-CM

## 2024-04-26 DIAGNOSIS — Z01.419 WELL WOMAN EXAM WITH ROUTINE GYNECOLOGICAL EXAM: Primary | ICD-10-CM

## 2024-04-26 DIAGNOSIS — N89.8 VAGINAL ODOR: ICD-10-CM

## 2024-04-26 LAB
C TRACH DNA SPEC QL NAA+PROBE: NOT DETECTED
CLUE CELLS VAG QL WET PREP: ABNORMAL
HBV SURFACE AG SERPL QL IA: NONREACTIVE
HCV AB SERPL QL IA: NONREACTIVE
HIV 1+2 AB+HIV1 P24 AG SERPL QL IA: NONREACTIVE
N GONORRHOEA DNA SPEC QL NAA+PROBE: NOT DETECTED
SOURCE (OHS): NORMAL
T PALLIDUM AB SER QL: NONREACTIVE
T VAGINALIS VAG QL WET PREP: ABNORMAL
WBC #/AREA VAG WET PREP: ABNORMAL
YEAST SPEC QL WET PREP: ABNORMAL

## 2024-04-26 PROCEDURE — 3044F HG A1C LEVEL LT 7.0%: CPT | Mod: CPTII,,,

## 2024-04-26 PROCEDURE — 3074F SYST BP LT 130 MM HG: CPT | Mod: CPTII,,,

## 2024-04-26 PROCEDURE — 3008F BODY MASS INDEX DOCD: CPT | Mod: CPTII,,,

## 2024-04-26 PROCEDURE — 99213 OFFICE O/P EST LOW 20 MIN: CPT | Mod: 25,S$PBB,,

## 2024-04-26 PROCEDURE — 99213 OFFICE O/P EST LOW 20 MIN: CPT | Mod: PBBFAC

## 2024-04-26 PROCEDURE — 1159F MED LIST DOCD IN RCRD: CPT | Mod: CPTII,,,

## 2024-04-26 PROCEDURE — 3078F DIAST BP <80 MM HG: CPT | Mod: CPTII,,,

## 2024-04-26 PROCEDURE — 87389 HIV-1 AG W/HIV-1&-2 AB AG IA: CPT

## 2024-04-26 PROCEDURE — 86803 HEPATITIS C AB TEST: CPT

## 2024-04-26 PROCEDURE — 99385 PREV VISIT NEW AGE 18-39: CPT | Mod: S$PBB,,,

## 2024-04-26 PROCEDURE — 86780 TREPONEMA PALLIDUM: CPT

## 2024-04-26 PROCEDURE — 36415 COLL VENOUS BLD VENIPUNCTURE: CPT

## 2024-04-26 PROCEDURE — 87340 HEPATITIS B SURFACE AG IA: CPT

## 2024-04-26 PROCEDURE — 87491 CHLMYD TRACH DNA AMP PROBE: CPT

## 2024-04-26 PROCEDURE — 87210 SMEAR WET MOUNT SALINE/INK: CPT

## 2024-04-26 RX ORDER — MEDROXYPROGESTERONE ACETATE 10 MG/1
10 TABLET ORAL DAILY
Qty: 30 TABLET | Refills: 6 | Status: SHIPPED | OUTPATIENT
Start: 2024-04-26 | End: 2024-06-18

## 2024-04-26 RX ORDER — METRONIDAZOLE 500 MG/1
500 TABLET ORAL 2 TIMES DAILY
Qty: 14 TABLET | Refills: 0 | Status: SHIPPED | OUTPATIENT
Start: 2024-04-26 | End: 2024-05-03

## 2024-04-26 NOTE — TELEPHONE ENCOUNTER
Patient calling back for results. Informed patient of results per Nirmala Bautista NP's message. Patient verbalized understanding.     Patient inquired about her other results. I informed patient her bloodwork all came back negative and the rest of her swabs came back negative as well. Patient verbalized understanding. No further questions at this time.

## 2024-04-26 NOTE — TELEPHONE ENCOUNTER
Nirmala Bautista FNP     KJ    4/26/24 12:15 PM  Note  Swab showed clue cells indicating bacterial vaginosis. Not an STD but an infection in the vaginal area when pH is disrupted. Rx sent for Flagyl. No alcohol during and for 48-72 hours after treatment. Use unscented soap and no scented products. More showers than baths. No douching.

## 2024-04-26 NOTE — PROGRESS NOTES
[facility] -  Gynecology / Women's Health Clinic     Subjective:      Patient ID: Ida Saxena is a 39 y.o. female.    Chief Complaint:  Gynecologic Exam      History of Present Illness:  The patient  here for annual exam. Her LMP was 4/10/24. Period last 4 days and changes pads 4-5x/day on heaviest 3 days, uses 3 thick pads at once. Admitted cycles regular and monthly.  Denies history of abnormal paps. Last pap 3/28/23 -NIL and HPV neg. Denies breast or urinary complaints. Denies pelvic pain or discharge. Pt reports no STIs in the past and desires testing. Declines contraception, desires pregnancy in near future. Pt admitted being evaluated for bariatric surgery within this year. Denies tobacco use. Dep. screening 0. Denies fly hx of ovarian or uterine cancer. Maternal aunt and paternal aunts x4 with breast cancer. Mother with colon cancer. Pt followed by IM, Neurology, Neurosurgery.  Pt with hx of elevated prolactin level, [place] [facility] Per Neurosurgery note 2023, MRI findings of possible small Lt pituitary microadenoma 3-4mm in size, f/u 2024 for repeat MRI, no treatment currently. Pt denies Galactorrhea.    The patient presents to the clinic with c/o heavy bleeding during monthly regular cycles. LMP 4/10/24, lasting 4 days and changes pads 4-5x/day on heaviest 3 days, uses 3 thick pads at once. Pt reports taking Iron supplement daily, 2024 H/H 11.2/36.3. Pt c/o vaginal odor occ, for months, using [place] body wash for baths. Pt denies douching, bubble baths.    GYN & OB History:  Patient's last menstrual period was 04/10/2024 (approximate).   Date of Last Pap: 4/3/2023    OB History    Para Term  AB Living   3 3 3         SAB IAB Ectopic Multiple Live Births                  # Outcome Date GA Lbr Matthew/2nd Weight Sex Type Anes PTL Lv   3 Term      CS-LTranv      2 Term      CS-LTranv      1 Term      CS-LTranv          Past Medical  "History:   Diagnosis Date    Abnormal uterine bleeding     Anemia 2022    Encounter for Papanicolaou smear of cervix 2023    Hypertension     Idiopathic intracranial hypertension     Migraines     Missed period 2023    Papilledema     Pituitary microadenoma         Past Surgical History:   Procedure Laterality Date     SECTION      x 3    DILATION AND CURETTAGE OF UTERUS N/A 2023    Procedure: DILATION AND CURETTAGE, UTERUS fx;  Surgeon: Jose Canales Jr., MD;  Location: [facility];  Service: OB/GYN;  Laterality: N/A;        Social History     Tobacco Use    Smoking status: Former     Current packs/day: 0.00     Average packs/day: 0.5 packs/day for 15.0 years (7.5 ttl pk-yrs)     Types: Cigarettes, Vaping with nicotine     Start date: 2006     Quit date: 2021     Years since quitting: 3.3    Smokeless tobacco: Never    Tobacco comments:     Patient states "I stop smoking since "   Substance and Sexual Activity    Alcohol use: Yes     Alcohol/week: 2.0 standard drinks of alcohol     Types: 2 Glasses of wine per week     Comment: occassionally    Drug use: Never    Sexual activity: Yes     Partners: Male        Current Outpatient Medications   Medication Instructions    acetaminophen (TYLENOL) 1,000 mg, Oral, Every 6 hours PRN    acetaZOLAMIDE (DIAMOX) 500 mg, Oral, 2 times daily    aspirin/salicylamide/caffeine (BC HEADACHE POWDER ORAL) Oral, Daily    ferrous sulfate (FEOSOL) 325 mg, Oral, Daily    medroxyPROGESTERone (PROVERA) 10 mg, Oral, Daily, Take daily as prescribed.    rizatriptan (MAXALT) 10 mg, Oral, 2 times daily PRN       Review of patient's allergies indicates:  No Known Allergies      Review of Systems:  Review of Systems  Negative except for pertinent findings for positives per HPI.     Objective:     Physical Exam   Visit Vitals  /72   Temp 98.4 °F (36.9 °C) (Oral)   Resp 18   Ht 5' 7" (1.702 m)   Wt 123.4 kg (272 lb)   LMP 04/10/2024 (Approximate)   SpO2 " 100%   BMI 42.60 kg/m²       GENERAL: Well-developed female. No acute distress.    SKIN: Normal to inspection, warm and intact.  BREASTS: No rashes or erythema. No masses, lumps, discharge, tenderness.  VULVA: General appearance normal; external genitalia with no lesions or erythema.  VAGINA: Mucosa/vaginal vault pink, no lesions. Thin white discharge noted.  CERVIX: Pink, parous appearing os, no erythema or abnormal discharge.  BIMANUAL EXAM: reveals a 10 week-sized uterus. The uterus is non tender. Bilateral adnexa reveal no tenderness.  PSYCHIATRIC: Patient is oriented to person, place, and time. Mood and affect are normal.    Assessment:       ICD-10-CM ICD-9-CM   1. Well woman exam with routine gynecological exam  Z01.419 V72.31   2. Screening for STD (sexually transmitted disease)  Z11.3 V74.5   3. Abnormal uterine bleeding  N93.9 626.9   4. Vaginal odor  N89.8 625.8       Plan:     1. Well woman exam with routine gynecological exam    2. Screening for STD (sexually transmitted disease)  -     Wet Prep, Genital  -     Chlamydia/GC, PCR  -     HIV 1/2 Ag/Ab (4th Gen); Future; Expected date: 04/26/2024  -     Hepatitis C Antibody; Future; Expected date: 04/26/2024  -     Hepatitis B Surface Antigen; Future; Expected date: 04/26/2024  -     SYPHILIS ANTIBODY (WITH REFLEX RPR); Future; Expected date: 04/26/2024    3. Abnormal uterine bleeding  -     US Pelvis Comp with Transvag NON-OB (xpd; Future; Expected date: 04/26/2024  -     medroxyPROGESTERone (PROVERA) 10 MG tablet; Take 1 tablet (10 mg total) by mouth once daily. Take daily as prescribed.  Dispense: 30 tablet; Refill: 6    4. Vaginal odor  -     Wet Prep, Genital    Pelvic exam today, pap UTD per ACOG  STD testing    Discussed options for medical management of AUB with pt. Depo, Provera and Mirena IUD. Depo-pt informed that will likely control bleeding either with amenorrhea or lighten cycles, potential weight gain. Mirena IUD also likely best option for  medical management. Limited systemic absorption, possible amenorrhea or lighter cycles and coverage for 5 years. Provera-pt informed that will likely control bleeding either with amenorrhea or lighten cycles. Discussed with pt that Provera does not provide contraception, continue condom use to prevent pregnancy, Provera is a hormone, still risk of blood clots, MI and CVA, pt states she understands risk and wishes to proceed. Pt proceeded with Provera, no interactions with other medications patient is currently on.  AUB - Pelvic ultrasound, Provera prescribed  Vaginal odor - wet prep  Use unscented soap and no scented products. More showers than baths. No douching. Avoid prolonged wet clothing; avoid tight fitting clothing. Wear cotton underwear. Wipe front to back. Recommend probiotics.  Call for any GYN concerns  Follow up in about 1 year (around 4/26/2025) for Annual.

## 2024-05-01 ENCOUNTER — HOSPITAL ENCOUNTER (EMERGENCY)
Facility: HOSPITAL | Age: 39
Discharge: HOME | End: 2024-05-01
Attending: EMERGENCY MEDICINE
Payer: MEDICAID

## 2024-05-01 VITALS
WEIGHT: 275.56 LBS | HEIGHT: 67 IN | DIASTOLIC BLOOD PRESSURE: 78 MMHG | HEART RATE: 75 BPM | RESPIRATION RATE: 18 BRPM | TEMPERATURE: 98 F | SYSTOLIC BLOOD PRESSURE: 125 MMHG | OXYGEN SATURATION: 98 % | BODY MASS INDEX: 43.25 KG/M2

## 2024-05-01 DIAGNOSIS — M54.42 ACUTE BILATERAL LOW BACK PAIN WITH LEFT-SIDED SCIATICA: Primary | ICD-10-CM

## 2024-05-01 LAB
B-HCG UR QL: NEGATIVE
CTP QC/QA: YES

## 2024-05-01 PROCEDURE — 99284 EMERGENCY DEPT VISIT MOD MDM: CPT | Mod: 25

## 2024-05-01 PROCEDURE — 96372 THER/PROPH/DIAG INJ SC/IM: CPT | Performed by: PHYSICIAN ASSISTANT

## 2024-05-01 PROCEDURE — 63600175 PHARM REV CODE 636 W HCPCS: Performed by: PHYSICIAN ASSISTANT

## 2024-05-01 PROCEDURE — 25000003 PHARM REV CODE 250: Performed by: PHYSICIAN ASSISTANT

## 2024-05-01 PROCEDURE — 81025 URINE PREGNANCY TEST: CPT | Performed by: PHYSICIAN ASSISTANT

## 2024-05-01 RX ORDER — METHYLPREDNISOLONE 4 MG/1
TABLET ORAL
Qty: 21 EACH | Refills: 0 | Status: SHIPPED | OUTPATIENT
Start: 2024-05-01 | End: 2024-05-22

## 2024-05-01 RX ORDER — KETOROLAC TROMETHAMINE 30 MG/ML
30 INJECTION, SOLUTION INTRAMUSCULAR; INTRAVENOUS
Status: COMPLETED | OUTPATIENT
Start: 2024-05-01 | End: 2024-05-01

## 2024-05-01 RX ORDER — CYCLOBENZAPRINE HCL 10 MG
10 TABLET ORAL
Status: COMPLETED | OUTPATIENT
Start: 2024-05-01 | End: 2024-05-01

## 2024-05-01 RX ORDER — CYCLOBENZAPRINE HCL 10 MG
10 TABLET ORAL 3 TIMES DAILY PRN
Qty: 21 TABLET | Refills: 0 | Status: SHIPPED | OUTPATIENT
Start: 2024-05-01 | End: 2024-05-08

## 2024-05-01 RX ADMIN — KETOROLAC TROMETHAMINE 30 MG: 30 INJECTION, SOLUTION INTRAMUSCULAR at 02:05

## 2024-05-01 RX ADMIN — CYCLOBENZAPRINE 10 MG: 10 TABLET, FILM COATED ORAL at 02:05

## 2024-05-01 NOTE — ED PROVIDER NOTES
"Encounter Date: 2024       History     Chief Complaint   Patient presents with    Back Pain     Co back pain for one week , seen [facility] er last week told may have pulled muscle and given medications with no relief      Ida Saxena is a 39 y.o. female with a history of HTN, migraines, and anemia who presents to the ED complaining of back pain x 1 week. Reports pain initially started in the middle of her back but now is also in the lower back and radiates down her legs. Pain is worse on the left side. Denies fall or trauma. Was seen at [facility] and was prescribed naproxen, methocarbamol, and tramadol with no relief. She denies numbness, paresthesias, focal weakness, bowel/bladder incontinence.     The history is provided by the patient.     Review of patient's allergies indicates:  No Known Allergies  Past Medical History:   Diagnosis Date    Abnormal uterine bleeding     Anemia 2022    Encounter for Papanicolaou smear of cervix 2023    Hypertension     Idiopathic intracranial hypertension     Migraines     Missed period 2023    Papilledema     Pituitary microadenoma      Past Surgical History:   Procedure Laterality Date     SECTION      x 3    DILATION AND CURETTAGE OF UTERUS N/A 2023    Procedure: DILATION AND CURETTAGE, UTERUS fx;  Surgeon: Jose Canales Jr., MD;  Location: [facility] OR;  Service: OB/GYN;  Laterality: N/A;     Family History   Problem Relation Name Age of Onset    Colon cancer Maternal Grandmother      Hypothyroidism Mother      Heart murmur Mother      Breast cancer Other          Mother's sister     Social History     Tobacco Use    Smoking status: Former     Current packs/day: 0.00     Average packs/day: 0.5 packs/day for 15.0 years (7.5 ttl pk-yrs)     Types: Cigarettes, Vaping with nicotine     Start date: 2006     Quit date: 2021     Years since quitting: 3.3    Smokeless tobacco: Never    Tobacco comments:     Patient states "I stop smoking since " "2021"   Substance Use Topics    Alcohol use: Yes     Alcohol/week: 2.0 standard drinks of alcohol     Types: 2 Glasses of wine per week     Comment: occassionally    Drug use: Never     Review of Systems   Constitutional:  Negative for chills and fever.   HENT:  Negative for congestion and sore throat.    Eyes:  Negative for redness and itching.   Respiratory:  Negative for cough and shortness of breath.    Cardiovascular:  Negative for chest pain and leg swelling.   Gastrointestinal:  Negative for abdominal pain and nausea.   Genitourinary:  Negative for dysuria and frequency.   Musculoskeletal:  Positive for back pain. Negative for arthralgias and gait problem.   Skin:  Negative for rash and wound.   Neurological:  Negative for dizziness and weakness.   Hematological:  Does not bruise/bleed easily.       Physical Exam     Initial Vitals [05/01/24 1315]   BP Pulse Resp Temp SpO2   126/84 75 18 98.1 °F (36.7 °C) 98 %      MAP       --         Physical Exam    Nursing note and vitals reviewed.  Constitutional: She appears well-developed and well-nourished. No distress.   HENT:   Head: Normocephalic and atraumatic.   Mouth/Throat: No oropharyngeal exudate.   Eyes: EOM are normal. No scleral icterus.   Neck: Neck supple.   Normal range of motion.  Cardiovascular:  Normal rate and regular rhythm.           No murmur heard.  Pulmonary/Chest: Breath sounds normal. No respiratory distress. She has no wheezes.   Abdominal: Abdomen is soft. Bowel sounds are normal. She exhibits no distension. There is no abdominal tenderness.   Musculoskeletal:      Cervical back: Normal, normal range of motion and neck supple.      Thoracic back: Tenderness (bilateral paraspinal muscles) present. No deformity or bony tenderness.      Lumbar back: Tenderness (bilateral paraspinal muscles) present. No bony tenderness. Positive left straight leg raise test. Negative right straight leg raise test.      Comments: Ambulating independently. 5/5 " strength bilateral LE. 2+ DP pulse bilaterally.      Neurological: She is alert and oriented to person, place, and time. No cranial nerve deficit.   Skin: Skin is warm and dry. Capillary refill takes less than 2 seconds. No erythema.   Psychiatric: She has a normal mood and affect. Her behavior is normal. Judgment and thought content normal.         ED Course   Procedures  Labs Reviewed   POCT URINE PREGNANCY          Imaging Results              X-Ray Lumbar Spine Ap And Lateral (Final result)  Result time 05/01/24 14:59:16      Final result by Goyo Potts MD (05/01/24 14:59:16)                   Impression:      No acute radiographic findings.      Electronically signed by: Goyo Potts  Date:    05/01/2024  Time:    14:59               Narrative:    EXAMINATION:  XR LUMBAR SPINE AP AND LATERAL    CLINICAL HISTORY:  low back pain;    COMPARISON:  None    FINDINGS:  Frontal and lateral views of the lumbar spine.  Alignment is within normal limits.  There is no significant loss of vertebral body height.  There are mild degenerative changes primarily L5-S1.                        Wet Read by Roseline Louise PA-C (05/01/24 14:55:27, [facility], Emergency Medicine)    No acute osseous abnormality                                     Medications   ketorolac injection 30 mg (30 mg Intramuscular Given 5/1/24 1402)   cyclobenzaprine tablet 10 mg (10 mg Oral Given 5/1/24 1402)     Medical Decision Making  Differential: lumbar strain, muscle spasm, sciatica, DDD, among others    ED management:  XR with mild degenerative changes. Pt pain improved to 6/10 with flexeril and toradol. Stable for discharge home. Instructed to follow up with PCP in 2-3 days. ED return precautions givne. She verbalized understanding. All questions answered.     Amount and/or Complexity of Data Reviewed  Labs: ordered.  Radiology: ordered and independent interpretation performed. Decision-making details documented  in ED Course.    Risk  Prescription drug management.               ED Course as of 05/01/24 1506   Wed May 01, 2024   1502 X-Ray Lumbar Spine Ap And Lateral  There are mild degenerative changes primarily L5-S1. [KD]      ED Course User Index  [KD] Roseline Louise PA-C                           Clinical Impression:  Final diagnoses:  [M54.42] Acute bilateral low back pain with left-sided sciatica (Primary)          ED Disposition Condition    Discharge Stable          ED Prescriptions       Medication Sig Dispense Start Date End Date Auth. Provider    cyclobenzaprine (FLEXERIL) 10 MG tablet Take 1 tablet (10 mg total) by mouth 3 (three) times daily as needed for Muscle spasms. 21 tablet 5/1/2024 5/8/2024 Roseline Louise PA-C    methylPREDNISolone (MEDROL DOSEPACK) 4 mg tablet use as directed 21 each 5/1/2024 5/22/2024 Roseline Louise PA-C          Follow-up Information       Follow up With Specialties Details Why Contact Info    [facility] Emergency Medicine  If symptoms worsen [address] [facility] [id number] [phone]    Mary Shields, [facility] Family [facility] In 3 days Hospital follow up [address] [facility] [address]  [phone]               Roseline Louise PA-C  05/01/24 1503

## 2024-05-01 NOTE — DISCHARGE INSTRUCTIONS
Hold methocarbamol while taking cyclobenzaprine.     It is important that you follow up with your primary care provider or specialist if indicated for further evaluation, workup, and treatment as necessary. The exam and treatment you received in Emergency Department was for an urgent problem and NOT INTENDED AS COMPLETE CARE. It is important that you FOLLOW UP with a doctor for ongoing care. If your symptoms become WORSE or you DO NOT IMPROVE and you are unable to reach your health care provider, you should RETURN to the Emergency Department.

## 2024-05-01 NOTE — Clinical Note
"Ida Guerrero" Hemanth was seen and treated in our emergency department on 5/1/2024.  She may return to work on 05/02/2024.       If you have any questions or concerns, please don't hesitate to call.      JHONATAN GARCIA RN    "

## 2024-05-03 ENCOUNTER — CLINICAL SUPPORT (OUTPATIENT)
Dept: BARIATRICS | Facility: HOSPITAL | Age: 39
End: 2024-05-03

## 2024-05-03 VITALS — BODY MASS INDEX: 42.37 KG/M2 | WEIGHT: 270.5 LBS

## 2024-05-03 DIAGNOSIS — E66.01 CLASS 3 SEVERE OBESITY WITH BODY MASS INDEX (BMI) OF 40.0 TO 44.9 IN ADULT, UNSPECIFIED OBESITY TYPE, UNSPECIFIED WHETHER SERIOUS COMORBIDITY PRESENT: Primary | ICD-10-CM

## 2024-05-03 PROCEDURE — 94200034 HC BARIATRIC NUTRITIONAL SVCS PT GRADE I

## 2024-05-03 NOTE — PROGRESS NOTES
"Patient Education [x] MSWL Visit Number: 3/3     []  Pre-op Wt Loss Goal (as ordered on referral):   [] NS Visit:     Height:   Ht Readings from Last 1 Encounters:   05/01/24 5' 7" (1.702 m)      Weight:   Wt Readings from Last 3 Encounters:   05/01/24 1315 125 kg (275 lb 9.2 oz)   04/26/24 0749 123.4 kg (272 lb)   04/05/24 0813 122.5 kg (270 lb)      BMI:   BMI Readings from Last 1 Encounters:   05/01/24 43.16 kg/m²                                                                          Barriers to learning:  [x]None evident  []Acuity of illness  []Cognitive defects  []Cultural barriers  []Desire/Motivation  []Difficulty concentrating  []Emotional state  []Financial concerns  []Hearing deficit  []Language barrier  []Literacy  []Memory problems  []Vision impairment     Home caregiver present for session   []YES  [x] NO       Teaching methods:  [x]Demonstration  [x]Explanation  []Printed materials  []Teach back  []Virtual/web based         Verbalizes understanding Demonstrates  Needs further teaching Needs practice/ supervision Comments    Bariatric Surgery Diet  [] [] [] []    Clear liquid [] [] [] []    Full liquid [] [] [] []    Weight Reduction [x] [] [] []    Other Diet [] [] [] []        Expected Compliance:  [x]Good  []Fair  []Poor    Additional Information:  Pt has lost 2# since last visit with JAZMIN. She says she has not been eating late and she has been decreasing her starch intake. She has been drinking more water, cut out drinking all cokes, and has been rarely having juice. She walks one block around her neighborhood daily and walks at work every day- although activity has been less due to pain in her sciatic nerve. She went to ER and got medicine for the pain and reports it has been better since then. We discussed increasing protein- especially in the morning and having protein snacks to reduce cravings.     24 hr recall:  Breakfast: Oatmeal  Lunch: grilled fish-2 and a side salad  Dinner: smothered " pork chops one scoop rice and green beans  Water: 90 oz daily     Plan:  Limit starches, have one daily (1/2 cup)  Add protein to breakfast  Add high protein snack between lunch and dinner

## 2024-05-20 DIAGNOSIS — K21.9 GASTROESOPHAGEAL REFLUX DISEASE, UNSPECIFIED WHETHER ESOPHAGITIS PRESENT: Primary | ICD-10-CM

## 2024-05-21 ENCOUNTER — TELEPHONE (OUTPATIENT)
Dept: SURGERY | Facility: CLINIC | Age: 39
End: 2024-05-21
Payer: MEDICAID

## 2024-05-21 DIAGNOSIS — Z13.0 SCREENING FOR IRON DEFICIENCY ANEMIA: Primary | ICD-10-CM

## 2024-05-21 NOTE — TELEPHONE ENCOUNTER
----- Message from Shahla Younger sent at 5/21/2024  1:11 PM CDT -----  Regarding: Labs  Per consult note:   Repeat anemia labs in May.  Iron  Farooq  CBC  Please order in [facility], pt will complete at [facility] facility.

## 2024-05-29 ENCOUNTER — TELEPHONE (OUTPATIENT)
Dept: SURGERY | Facility: CLINIC | Age: 39
End: 2024-05-29
Payer: MEDICAID

## 2024-05-30 ENCOUNTER — LAB VISIT (OUTPATIENT)
Dept: LAB | Facility: HOSPITAL | Age: 39
End: 2024-05-30
Attending: SURGERY
Payer: MEDICAID

## 2024-05-30 DIAGNOSIS — Z13.0 SCREENING FOR IRON DEFICIENCY ANEMIA: ICD-10-CM

## 2024-05-30 LAB
BASOPHILS # BLD AUTO: 0.05 X10(3)/MCL
BASOPHILS NFR BLD AUTO: 0.8 %
EOSINOPHIL # BLD AUTO: 0.16 X10(3)/MCL (ref 0–0.9)
EOSINOPHIL NFR BLD AUTO: 2.5 %
ERYTHROCYTE [DISTWIDTH] IN BLOOD BY AUTOMATED COUNT: 14.2 % (ref 11.5–17)
FERRITIN SERPL-MCNC: 39.21 NG/ML (ref 4.63–204)
HCT VFR BLD AUTO: 40.1 % (ref 37–47)
HGB BLD-MCNC: 12.6 G/DL (ref 12–16)
IMM GRANULOCYTES # BLD AUTO: 0.02 X10(3)/MCL (ref 0–0.04)
IMM GRANULOCYTES NFR BLD AUTO: 0.3 %
IRON SATN MFR SERPL: 23 % (ref 20–50)
IRON SERPL-MCNC: 74 UG/DL (ref 50–170)
LYMPHOCYTES # BLD AUTO: 1.72 X10(3)/MCL (ref 0.6–4.6)
LYMPHOCYTES NFR BLD AUTO: 27 %
MCH RBC QN AUTO: 26.5 PG (ref 27–31)
MCHC RBC AUTO-ENTMCNC: 31.4 G/DL (ref 33–36)
MCV RBC AUTO: 84.2 FL (ref 80–94)
MONOCYTES # BLD AUTO: 0.46 X10(3)/MCL (ref 0.1–1.3)
MONOCYTES NFR BLD AUTO: 7.2 %
NEUTROPHILS # BLD AUTO: 3.96 X10(3)/MCL (ref 2.1–9.2)
NEUTROPHILS NFR BLD AUTO: 62.2 %
NRBC BLD AUTO-RTO: 0 %
PLATELET # BLD AUTO: 287 X10(3)/MCL (ref 130–400)
PMV BLD AUTO: 9.2 FL (ref 7.4–10.4)
RBC # BLD AUTO: 4.76 X10(6)/MCL (ref 4.2–5.4)
TIBC SERPL-MCNC: 248 UG/DL (ref 70–310)
TIBC SERPL-MCNC: 322 UG/DL (ref 250–450)
TRANSFERRIN SERPL-MCNC: 295 MG/DL (ref 180–382)
WBC # SPEC AUTO: 6.37 X10(3)/MCL (ref 4.5–11.5)

## 2024-05-30 PROCEDURE — 83540 ASSAY OF IRON: CPT

## 2024-05-30 PROCEDURE — 85025 COMPLETE CBC W/AUTO DIFF WBC: CPT

## 2024-05-30 PROCEDURE — 82728 ASSAY OF FERRITIN: CPT

## 2024-05-30 PROCEDURE — 36415 COLL VENOUS BLD VENIPUNCTURE: CPT

## 2024-06-05 ENCOUNTER — CLINICAL SUPPORT (OUTPATIENT)
Dept: BARIATRICS | Facility: HOSPITAL | Age: 39
End: 2024-06-05

## 2024-06-05 DIAGNOSIS — E66.01 CLASS 3 SEVERE OBESITY WITH BODY MASS INDEX (BMI) OF 40.0 TO 44.9 IN ADULT, UNSPECIFIED OBESITY TYPE, UNSPECIFIED WHETHER SERIOUS COMORBIDITY PRESENT: Primary | ICD-10-CM

## 2024-06-06 ENCOUNTER — TELEPHONE (OUTPATIENT)
Dept: SURGERY | Facility: CLINIC | Age: 39
End: 2024-06-06
Payer: MEDICAID

## 2024-06-06 VITALS — WEIGHT: 276.19 LBS | BODY MASS INDEX: 43.26 KG/M2

## 2024-06-06 NOTE — TELEPHONE ENCOUNTER
Ronaldo she stated that she got an email to make and appt but she wasn't sure what it was about. I know you were awaiting neuro clearance not sure if you need to reach out to the patient regarding status.

## 2024-06-06 NOTE — TELEPHONE ENCOUNTER
----- Message from CAIN Miller sent at 6/5/2024  1:32 PM CDT -----  Please let pt know that all labs and iron are now normal. Continue taking iron daily  ----- Message -----  From: Jackie Del Real MA  Sent: 6/5/2024  12:36 PM CDT  To: CAIN Miller    Labs in chart  ----- Message -----  From: Nimisha Aparicio MA  Sent: 5/29/2024  12:00 AM CDT  To: Cortes Pennington Staff      ----- Message -----  From: Jackie Del Real MA  Sent: 5/17/2024  12:00 AM CDT  To: Hugo Andrews Staff    1. Repeat iron panel, ferritin, and CBC in 3 months   H&H was 9&33, I sent her a script for ferrous sulfate to take daily

## 2024-06-17 DIAGNOSIS — G93.2 IIH (IDIOPATHIC INTRACRANIAL HYPERTENSION): ICD-10-CM

## 2024-06-17 RX ORDER — ACETAZOLAMIDE 500 MG/1
500 CAPSULE, EXTENDED RELEASE ORAL 2 TIMES DAILY
Qty: 60 CAPSULE | Refills: 2 | Status: SHIPPED | OUTPATIENT
Start: 2024-06-17 | End: 2024-09-15

## 2024-06-17 NOTE — TELEPHONE ENCOUNTER
----- Message from Dulce Maria Pa sent at 6/17/2024 10:24 AM CDT -----  Regarding: Medication Refill  Patient called and stated she needs a refill on the medication acetaZOLAMIDE (DIAMOX) 500 mg CpSR sent to [id number] - [place], [place] - [address]  AT [facility] [address]    Please Advise

## 2024-06-18 ENCOUNTER — TELEPHONE (OUTPATIENT)
Dept: ENDOCRINOLOGY | Facility: CLINIC | Age: 39
End: 2024-06-18

## 2024-06-18 ENCOUNTER — LAB VISIT (OUTPATIENT)
Dept: LAB | Facility: HOSPITAL | Age: 39
End: 2024-06-18
Attending: STUDENT IN AN ORGANIZED HEALTH CARE EDUCATION/TRAINING PROGRAM
Payer: MEDICAID

## 2024-06-18 ENCOUNTER — OFFICE VISIT (OUTPATIENT)
Dept: ENDOCRINOLOGY | Facility: CLINIC | Age: 39
End: 2024-06-18
Payer: MEDICAID

## 2024-06-18 VITALS
TEMPERATURE: 98 F | RESPIRATION RATE: 18 BRPM | DIASTOLIC BLOOD PRESSURE: 79 MMHG | WEIGHT: 278.31 LBS | HEIGHT: 67 IN | BODY MASS INDEX: 43.68 KG/M2 | HEART RATE: 71 BPM | SYSTOLIC BLOOD PRESSURE: 117 MMHG

## 2024-06-18 DIAGNOSIS — E22.9 PITUITARY MICROADENOMA WITH HYPERPROLACTINEMIA: ICD-10-CM

## 2024-06-18 DIAGNOSIS — E22.9 PITUITARY MICROADENOMA WITH HYPERPROLACTINEMIA: Primary | ICD-10-CM

## 2024-06-18 DIAGNOSIS — D35.2 PITUITARY MICROADENOMA WITH HYPERPROLACTINEMIA: ICD-10-CM

## 2024-06-18 DIAGNOSIS — D35.2 PITUITARY MICROADENOMA WITH HYPERPROLACTINEMIA: Primary | ICD-10-CM

## 2024-06-18 LAB
PROLACTIN LEVEL (OLG): 33.87 NG/ML (ref 5.18–26.53)
T4 FREE SERPL-MCNC: 0.77 NG/DL (ref 0.7–1.48)
TSH SERPL-ACNC: 1.28 UIU/ML (ref 0.35–4.94)

## 2024-06-18 PROCEDURE — 99214 OFFICE O/P EST MOD 30 MIN: CPT | Mod: PBBFAC

## 2024-06-18 PROCEDURE — 36415 COLL VENOUS BLD VENIPUNCTURE: CPT

## 2024-06-18 PROCEDURE — 84305 ASSAY OF SOMATOMEDIN: CPT

## 2024-06-18 PROCEDURE — 82024 ASSAY OF ACTH: CPT

## 2024-06-18 PROCEDURE — 84443 ASSAY THYROID STIM HORMONE: CPT

## 2024-06-18 PROCEDURE — 84439 ASSAY OF FREE THYROXINE: CPT

## 2024-06-18 PROCEDURE — 84146 ASSAY OF PROLACTIN: CPT

## 2024-06-18 RX ORDER — ESCITALOPRAM OXALATE 10 MG/1
10 TABLET ORAL
COMMUNITY
Start: 2024-05-30 | End: 2024-06-18

## 2024-06-18 RX ORDER — TRAZODONE HYDROCHLORIDE 50 MG/1
50-100 TABLET ORAL NIGHTLY PRN
COMMUNITY
Start: 2024-05-30 | End: 2024-06-18

## 2024-06-18 RX ORDER — DEXAMETHASONE 1 MG/1
1 TABLET ORAL ONCE
Qty: 1 TABLET | Refills: 0 | Status: SHIPPED | OUTPATIENT
Start: 2024-06-18 | End: 2024-06-20 | Stop reason: SDUPTHER

## 2024-06-18 RX ORDER — HYDROCHLOROTHIAZIDE 25 MG/1
TABLET ORAL
COMMUNITY
End: 2024-06-18

## 2024-06-18 NOTE — PROGRESS NOTES
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 female who  has a past medical history of Abnormal uterine bleeding, Anemia, Encounter for Papanicolaou smear of cervix, Hypertension, Idiopathic intracranial hypertension, Migraines, Missed period, Papilledema, and Pituitary microadenoma.  She presents to endocrine clinic today for evaluation of her pituitary microadenoma. Today patient is complaining of headaches, blurry vision which has improved significantly with glasses though worse at night, and had AUB which lasted 4 months but ceased after D&C performed by GYN. States she has had normal menstrual cycles once a month prior to and after this episode with 4 day of menstruation. Denies galactorrhea. She is managed by Dr Rob and her eye doctor for her idiopathic intracranial hypertension. She was also seen by Dr. Gerard VELAZQUEZ at [facility] in [place], who evaluated lesion and was not concerned for malignancy.     Review of Systems  Pertinent +: Headaches  Pertinent -: no vision loss, no heat/cold intolerance, no polydipsia, polyuria, well regulated menstrual cycle      Objective:   Last 24 Hour Vital Signs:  Vitals  BP: 117/79  Temp: 98.1 °F (36.7 °C)  Temp Source: Oral  Pulse: 71  Resp: 18  Height: 5' 7" (170.2 cm)  Weight: 126.2 kg (278 lb 5.3 oz)    Physical Examination:    General: Obese female in NAD  HEENT: PERRLA, NC/AT, MMM  Respiratory: CTAB, normal respiratory effort  Cardiovascular: RRR, no m/r/g  Gastrointestinal: S, NT, ND, active BS, no masses palpable  Musculoskeletal: no obvious deformities  Integumentary: no rashes or skin lesions present  Neurologic: AAOx4, CN II-XII grossly intact, normal gait    Labs  BMP:   Lab Results   Component Value Date    CO2 20 (L) 02/27/2024    BUN 10.9 02/27/2024    CREATININE 0.72 02/27/2024    GLUCOSE 101 (H) 02/27/2024    CALCIUM 9.3 02/27/2024     CBC:   Lab Results "   Component Value Date    WBC 6.37 05/30/2024    HGB 12.6 05/30/2024    HCT 40.1 05/30/2024    MCV 84.2 05/30/2024    RDW 14.2 05/30/2024     LFTs:   Lab Results   Component Value Date    LABPROT 7.0 02/27/2024    ALBUMIN 3.4 (L) 02/27/2024    AST 14 02/27/2024    ALT 13 02/27/2024    ALKPHOS 83 02/27/2024     FLP:   Lab Results   Component Value Date    CHOL 124 05/18/2023    HDL 32 (L) 05/18/2023    LDL 79.00 05/18/2023    TRIG 64 05/18/2023    TOTALCHOLEST 4 05/18/2023     DM:   Lab Results   Component Value Date    HGBA1C 5.0 03/07/2024    EAG 96.8 03/07/2024    CREATININE 0.72 02/27/2024     Thyroid:   Lab Results   Component Value Date    TSH 0.6313 09/13/2022    ENJIZG8YYPZ 0.79 09/13/2022     Anemia:   Lab Results   Component Value Date    IRON 74 05/30/2024    TIBC 322 05/30/2024    FERRITIN 39.21 05/30/2024    GZXHIVYT24 612 09/13/2022             Assessment & Plan:     Pituitary Microadenoma  - Stable appearing 4-5 mm left posterior pituitary nodule,  - Will assess for active vs inactive lesion; obtain IGF-1, Prolactin TSH/FT4, ACTH, no need for FSH/LH as cycle is normal  - Will send 1 mg dexamethasone for low-dose dexamethasone suppression test  - No need for repeat imaging at this time  - Continue f/u with NSGY, neurology/eye doctor, and PCP    To be addressed at next follow-up  -test results      Follow-ups  -Follow-up in endocrine clinic in 1 year      Carina Mckenzie MD  [employer] IM PGY III

## 2024-06-19 LAB — ACTH PLAS-MCNC: 24 PG/ML

## 2024-06-20 RX ORDER — DEXAMETHASONE 1 MG/1
1 TABLET ORAL ONCE
Qty: 1 TABLET | Refills: 0 | Status: SHIPPED | OUTPATIENT
Start: 2024-06-20 | End: 2024-06-20

## 2024-06-21 LAB
IGF-I SERPL-MCNC: 83 NG/ML (ref 54–258)
IGF-I Z-SCORE SERPL: -1.04 SD

## 2024-06-24 NOTE — TELEPHONE ENCOUNTER
Labs show mild prl elevation.  Await results of the low dose dex suppression test to sort next steps.

## 2024-07-01 ENCOUNTER — TELEPHONE (OUTPATIENT)
Dept: SURGERY | Facility: CLINIC | Age: 39
End: 2024-07-01
Payer: MEDICAID

## 2024-07-16 ENCOUNTER — CLINICAL SUPPORT (OUTPATIENT)
Dept: BARIATRICS | Facility: HOSPITAL | Age: 39
End: 2024-07-16

## 2024-07-16 VITALS — BODY MASS INDEX: 43.34 KG/M2 | WEIGHT: 276.69 LBS

## 2024-07-16 DIAGNOSIS — E66.01 CLASS 3 SEVERE OBESITY WITH BODY MASS INDEX (BMI) OF 40.0 TO 44.9 IN ADULT, UNSPECIFIED OBESITY TYPE, UNSPECIFIED WHETHER SERIOUS COMORBIDITY PRESENT: Primary | ICD-10-CM

## 2024-07-16 NOTE — PROGRESS NOTES
Patient present for monthly weight check.    Weighed-in at 276.7#.      Pt is above range, but it is likely due to alcohol. Pt states he \"had some beer\". Spoke with Dr. Amy Dooley, [facility] and go back to normal dosage. Recheck early next week\". Pt declined Monday and Tuesday appt due to playing golf those days. Anticoagulation Summary  As of 2023      INR goal:  3.0-4.0   TTR:  39.8 % (1.1 y)   INR used for dosin.2 (2023)   Warfarin maintenance plan:  2 mg (4 mg x 0.5), then 4 mg (4 mg x 1) repeating every 2 days   Average weekly warfarin total:  21 mg   Plan last modified:  Guillermo Lee MA (3/1/2023)   Next INR check:  2023   Target end date:   Indefinite    Indications    Persistent atrial fibrillation (HCC) [I48.19]  H/O mitral valve replacement [Z95.2]  Atrial flutter ([address]) [I48.92]  Long term (current) use of anticoagulants [Z79.01]                 Anticoagulation Episode Summary       INR check location:  Anticoagulation Clinic    Preferred lab:      Send INR reminders to:  [facility] CARDI[facility]    Comments:            Anticoagulation Care Providers       Provider Role Specialty Phone number    Earl Allen MD [facility] Cardiology [phone]

## 2024-08-14 ENCOUNTER — CLINICAL SUPPORT (OUTPATIENT)
Dept: BARIATRICS | Facility: HOSPITAL | Age: 39
End: 2024-08-14

## 2024-08-14 DIAGNOSIS — E66.01 CLASS 3 SEVERE OBESITY WITH BODY MASS INDEX (BMI) OF 40.0 TO 44.9 IN ADULT, UNSPECIFIED OBESITY TYPE, UNSPECIFIED WHETHER SERIOUS COMORBIDITY PRESENT: Primary | ICD-10-CM

## 2024-08-15 VITALS — BODY MASS INDEX: 44.04 KG/M2 | WEIGHT: 281.19 LBS

## 2024-08-15 NOTE — PROGRESS NOTES
Patient present for monthly weight check.    Weighed-in at 281.2# w/ PCP but no comparison weight. This is a gain but as per Juliana JARRETT, discussed she needs to heed what dietitian has discussed with her and needs to show a loss at class on 9/03.

## 2024-09-03 ENCOUNTER — CLINICAL SUPPORT (OUTPATIENT)
Dept: BARIATRICS | Facility: HOSPITAL | Age: 39
End: 2024-09-03

## 2024-09-03 VITALS — HEIGHT: 67 IN | BODY MASS INDEX: 43.51 KG/M2 | WEIGHT: 277.19 LBS

## 2024-09-03 DIAGNOSIS — E66.01 MORBID OBESITY WITH BMI OF 40.0-44.9, ADULT: Primary | ICD-10-CM

## 2024-09-03 NOTE — PROGRESS NOTES
Date of education: 9/3/24  Pt education type: [x]Pre op  []Post op  Surgery date: 10/2/24  Type of surgery: sleeve gastrectomy     Education was provided on:   []Importance of protein and vitamin protocol  []Importance of drinking  fl oz/day of non carbonated sugar free non caffeinated beverages  []Importance of following dietary protocol  [x]Importance of early ambulation postop   [x]Use of incentive spirometer 10 times an hour while awake  [x]Non opiod pain management post op   [x]Discontinuing use of meds containing aspirin, ibuprofen, NSAIDs, post op  [x]Signs and symptoms of immediate and long term complications post-op  [x]Prevention and signs and symptoms of blood clots   [x]Prevention and signs of infection  [x]Reviewed medication regimen  []Importance of adhering to behavioral changes  []Importance of following exercise protocol      Barriers to learning:  [x]None evident  []Acuity of illness  []Cognitive defects  []Cultural barriers  []Desire/Motivation  []Difficulty concentrating  []Emotional state  []Financial concerns  []Hearing deficit  []Language barrier  []Literacy  []Memory problems  []Vision impairment     Teaching methods:  []Demonstration  [x]Explanation  [x]Printed materials  [x]Teach back  []Virtual/web based    Expected Compliance:  [x]Good  []Fair  []Poor    Additional Notes:

## 2024-09-03 NOTE — PROGRESS NOTES
Date of education: 9/3/24  Pt education type: [x]Pre op  []Post op  Surgery date: 10/2/24  Type of surgery: sleeve gastrectomy     Education was provided on:   [x]Importance of protein and vitamin protocol  [x]Importance of drinking  fl oz/day of non carbonated sugar free non caffeinated beverages  [x]Importance of following dietary protocol  []Importance of early ambulation postop   []Use of incentive spirometer 10 times an hour while awake  []Non opiod pain management post op   []Discontinuing use of meds containing aspirin, ibuprofen, NSAIDs, post op  []Signs and symptoms of immediate and long term complications post-op  []Prevention and signs and symptoms of blood clots   []Prevention and signs of infection  []Reviewed medication regimen  []Importance of adhering to behavioral changes  []Importance of following exercise protocol      Barriers to learning:  [x]None evident  []Acuity of illness  []Cognitive defects  []Cultural barriers  []Desire/Motivation  []Difficulty concentrating  []Emotional state  []Financial concerns  []Hearing deficit  []Language barrier  []Literacy  []Memory problems  []Vision impairment     Teaching methods:  []Demonstration  [x]Explanation  [x]Printed materials  [x]Teach back  []Virtual/web based    Expected Compliance:  [x]Good  []Fair  []Poor    Additional Notes:

## 2024-09-04 ENCOUNTER — TELEPHONE (OUTPATIENT)
Dept: SURGERY | Facility: CLINIC | Age: 39
End: 2024-09-04
Payer: MEDICAID

## 2024-09-04 DIAGNOSIS — E61.1 IRON DEFICIENCY: Primary | ICD-10-CM

## 2024-09-04 DIAGNOSIS — R14.0 MENSTRUAL BLOATING: ICD-10-CM

## 2024-09-04 DIAGNOSIS — N94.89 MENSTRUAL BLOATING: ICD-10-CM

## 2024-09-04 RX ORDER — MEDROXYPROGESTERONE ACETATE 10 MG/1
10 TABLET ORAL
COMMUNITY
Start: 2024-09-01

## 2024-09-04 NOTE — PROGRESS NOTES
Date of education: 9/3/24  Pt education type: [x]Pre op  []Post op  Surgery date: 10/2/24  Type of surgery: sleeve gastrectomy     Education was provided on:   []Importance of protein and vitamin protocol  []Importance of drinking  fl oz/day of non carbonated sugar free non caffeinated beverages  []Importance of following dietary protocol  []Importance of early ambulation postop   []Use of incentive spirometer 10 times an hour while awake  []Non opiod pain management post op   []Discontinuing use of meds containing aspirin, ibuprofen, NSAIDs, post op  []Signs and symptoms of immediate and long term complications post-op  []Prevention and signs and symptoms of blood clots   []Prevention and signs of infection  []Reviewed medication regimen  [x]Importance of adhering to behavioral changes  [x]Importance of following exercise protocol      Barriers to learning:  [x]None evident  []Acuity of illness  []Cognitive defects  []Cultural barriers  []Desire/Motivation  []Difficulty concentrating  []Emotional state  []Financial concerns  []Hearing deficit  []Language barrier  []Literacy  []Memory problems  []Vision impairment     Teaching methods:  [x]Demonstration  [x]Explanation  [x]Printed materials  [x]Teach back  []Virtual/web based    Expected Compliance:  [x]Good  []Fair  []Poor    Additional Notes:  A body composition was conducted.    FORTINO Zavaleta, CPT, CHC

## 2024-09-04 NOTE — TELEPHONE ENCOUNTER
----- Message from Shahla Younger sent at 9/4/2024 10:07 AM CDT -----  Regarding: Pt Status  Pt called stating she has been having her cycle for 4 weeks.   VSG scheduled for 10/02/24.  Was awaiting call back from OB to discuss D&C.  Advised pt she was unable to have any other procedures 30 days pre/post op.   Please advise.

## 2024-09-04 NOTE — TELEPHONE ENCOUNTER
- Called pt and advised getting on progesterone to take before,during,after surgery to stop bleeding for now until her scheduled sleeve.   - Put in lab orders as well and advised to get it done at her earliest convenience.    Vik, place on recall so I can look at them.   - Ok to have D&C 30-60 days after VSG.

## 2024-09-05 ENCOUNTER — LAB VISIT (OUTPATIENT)
Dept: LAB | Facility: HOSPITAL | Age: 39
End: 2024-09-05
Attending: NURSE PRACTITIONER
Payer: MEDICAID

## 2024-09-05 DIAGNOSIS — D35.2 PITUITARY MICROADENOMA WITH HYPERPROLACTINEMIA: ICD-10-CM

## 2024-09-05 DIAGNOSIS — R14.0 MENSTRUAL BLOATING: ICD-10-CM

## 2024-09-05 DIAGNOSIS — D50.8 IRON DEFICIENCY ANEMIA SECONDARY TO INADEQUATE DIETARY IRON INTAKE: Primary | ICD-10-CM

## 2024-09-05 DIAGNOSIS — E61.1 IRON DEFICIENCY: ICD-10-CM

## 2024-09-05 DIAGNOSIS — E22.9 PITUITARY MICROADENOMA WITH HYPERPROLACTINEMIA: ICD-10-CM

## 2024-09-05 DIAGNOSIS — N94.89 MENSTRUAL BLOATING: ICD-10-CM

## 2024-09-05 DIAGNOSIS — I10 PRIMARY HYPERTENSION: ICD-10-CM

## 2024-09-05 PROBLEM — D50.9 IDA (IRON DEFICIENCY ANEMIA): Status: ACTIVE | Noted: 2024-09-05

## 2024-09-05 LAB
ALBUMIN SERPL-MCNC: 3.5 G/DL (ref 3.5–5)
ALBUMIN/GLOB SERPL: 1 RATIO (ref 1.1–2)
ALP SERPL-CCNC: 72 UNIT/L (ref 40–150)
ALT SERPL-CCNC: 15 UNIT/L (ref 0–55)
ANION GAP SERPL CALC-SCNC: 6 MEQ/L
AST SERPL-CCNC: 13 UNIT/L (ref 5–34)
BACTERIA #/AREA URNS AUTO: ABNORMAL /HPF
BASOPHILS # BLD AUTO: 0.03 X10(3)/MCL
BASOPHILS NFR BLD AUTO: 0.5 %
BILIRUB SERPL-MCNC: 0.2 MG/DL
BILIRUB UR QL STRIP.AUTO: NEGATIVE
BUN SERPL-MCNC: 7.9 MG/DL (ref 7–18.7)
CALCIUM SERPL-MCNC: 9 MG/DL (ref 8.4–10.2)
CHLORIDE SERPL-SCNC: 115 MMOL/L (ref 98–107)
CHOLEST SERPL-MCNC: 118 MG/DL
CHOLEST/HDLC SERPL: 4 {RATIO} (ref 0–5)
CLARITY UR: ABNORMAL
CO2 SERPL-SCNC: 19 MMOL/L (ref 22–29)
COLOR UR AUTO: ABNORMAL
CORTIS SERPL-SCNC: 21.3 UG/DL
CREAT SERPL-MCNC: 0.75 MG/DL (ref 0.55–1.02)
CREAT UR-MCNC: 87.8 MG/DL (ref 45–106)
CREAT/UREA NIT SERPL: 11
EOSINOPHIL # BLD AUTO: 0.12 X10(3)/MCL (ref 0–0.9)
EOSINOPHIL NFR BLD AUTO: 2.1 %
ERYTHROCYTE [DISTWIDTH] IN BLOOD BY AUTOMATED COUNT: 14 % (ref 11.5–17)
GFR SERPLBLD CREATININE-BSD FMLA CKD-EPI: >60 ML/MIN/1.73/M2
GLOBULIN SER-MCNC: 3.4 GM/DL (ref 2.4–3.5)
GLUCOSE SERPL-MCNC: 106 MG/DL (ref 74–100)
GLUCOSE UR QL STRIP: NORMAL
HCT VFR BLD AUTO: 34.8 % (ref 37–47)
HDLC SERPL-MCNC: 32 MG/DL (ref 35–60)
HGB BLD-MCNC: 10.7 G/DL (ref 12–16)
HGB UR QL STRIP: ABNORMAL
HYALINE CASTS #/AREA URNS LPF: ABNORMAL /LPF
IMM GRANULOCYTES # BLD AUTO: 0.04 X10(3)/MCL (ref 0–0.04)
IMM GRANULOCYTES NFR BLD AUTO: 0.7 %
IRON SATN MFR SERPL: 13 % (ref 20–50)
IRON SERPL-MCNC: 33 UG/DL (ref 50–170)
KETONES UR QL STRIP: NEGATIVE
LDLC SERPL CALC-MCNC: 66 MG/DL (ref 50–140)
LEUKOCYTE ESTERASE UR QL STRIP: NEGATIVE
LYMPHOCYTES # BLD AUTO: 1.7 X10(3)/MCL (ref 0.6–4.6)
LYMPHOCYTES NFR BLD AUTO: 29.1 %
MCH RBC QN AUTO: 27.1 PG (ref 27–31)
MCHC RBC AUTO-ENTMCNC: 30.7 G/DL (ref 33–36)
MCV RBC AUTO: 88.1 FL (ref 80–94)
MICROALBUMIN UR-MCNC: 162 UG/ML
MICROALBUMIN/CREAT RATIO PNL UR: 184.5 MG/GM CR (ref 0–30)
MONOCYTES # BLD AUTO: 0.44 X10(3)/MCL (ref 0.1–1.3)
MONOCYTES NFR BLD AUTO: 7.5 %
NEUTROPHILS # BLD AUTO: 3.51 X10(3)/MCL (ref 2.1–9.2)
NEUTROPHILS NFR BLD AUTO: 60.1 %
NITRITE UR QL STRIP: NEGATIVE
NRBC BLD AUTO-RTO: 0 %
PH UR STRIP: 7 [PH]
PLATELET # BLD AUTO: 274 X10(3)/MCL (ref 130–400)
PMV BLD AUTO: 9.2 FL (ref 7.4–10.4)
POTASSIUM SERPL-SCNC: 3.8 MMOL/L (ref 3.5–5.1)
PROT SERPL-MCNC: 6.9 GM/DL (ref 6.4–8.3)
PROT UR QL STRIP: ABNORMAL
RBC # BLD AUTO: 3.95 X10(6)/MCL (ref 4.2–5.4)
RBC #/AREA URNS AUTO: >100 /HPF
SODIUM SERPL-SCNC: 140 MMOL/L (ref 136–145)
SP GR UR STRIP.AUTO: 1.01 (ref 1–1.03)
SQUAMOUS #/AREA URNS LPF: ABNORMAL /HPF
TIBC SERPL-MCNC: 229 UG/DL (ref 70–310)
TIBC SERPL-MCNC: 262 UG/DL (ref 250–450)
TRANSFERRIN SERPL-MCNC: 249 MG/DL (ref 180–382)
TRIGL SERPL-MCNC: 100 MG/DL (ref 37–140)
UROBILINOGEN UR STRIP-ACNC: NORMAL
VLDLC SERPL CALC-MCNC: 20 MG/DL
WBC # BLD AUTO: 5.84 X10(3)/MCL (ref 4.5–11.5)
WBC #/AREA URNS AUTO: ABNORMAL /HPF

## 2024-09-05 PROCEDURE — 85025 COMPLETE CBC W/AUTO DIFF WBC: CPT

## 2024-09-05 PROCEDURE — 80053 COMPREHEN METABOLIC PANEL: CPT

## 2024-09-05 PROCEDURE — 83550 IRON BINDING TEST: CPT

## 2024-09-05 PROCEDURE — 36415 COLL VENOUS BLD VENIPUNCTURE: CPT

## 2024-09-05 PROCEDURE — 80061 LIPID PANEL: CPT

## 2024-09-05 PROCEDURE — 82043 UR ALBUMIN QUANTITATIVE: CPT

## 2024-09-05 PROCEDURE — 80299 QUANTITATIVE ASSAY DRUG: CPT

## 2024-09-05 PROCEDURE — 81001 URINALYSIS AUTO W/SCOPE: CPT

## 2024-09-05 PROCEDURE — 82533 TOTAL CORTISOL: CPT

## 2024-09-05 PROCEDURE — 82024 ASSAY OF ACTH: CPT

## 2024-09-05 RX ORDER — EPINEPHRINE 0.3 MG/.3ML
0.3 INJECTION SUBCUTANEOUS ONCE AS NEEDED
OUTPATIENT
Start: 2024-09-09

## 2024-09-05 RX ORDER — SODIUM CHLORIDE 0.9 % (FLUSH) 0.9 %
10 SYRINGE (ML) INJECTION
OUTPATIENT
Start: 2024-09-09

## 2024-09-05 RX ORDER — DIPHENHYDRAMINE HYDROCHLORIDE 50 MG/ML
50 INJECTION INTRAMUSCULAR; INTRAVENOUS ONCE AS NEEDED
OUTPATIENT
Start: 2024-09-09

## 2024-09-05 NOTE — PROGRESS NOTES
Patient's PCP is Yue Mccabe, please forward today's labs to her.    Thank you,    Maxine Thorne, KAITYC

## 2024-09-06 LAB — ACTH PLAS-MCNC: 58 PG/ML

## 2024-09-10 RX ORDER — HYDROCHLOROTHIAZIDE 12.5 MG/1
12.5 TABLET ORAL EVERY MORNING
COMMUNITY
Start: 2024-08-11 | End: 2024-09-13

## 2024-09-10 RX ORDER — GUAIFENESIN 1200 MG
TABLET, EXTENDED RELEASE 12 HR ORAL
COMMUNITY

## 2024-09-10 RX ORDER — IBUPROFEN 800 MG/1
800 TABLET ORAL 2 TIMES DAILY PRN
COMMUNITY
Start: 2024-09-01 | End: 2024-09-13

## 2024-09-10 NOTE — PROGRESS NOTES
"  Patient ID: [id number]   Chief Complaint: Pre-op Exam (VSG 10/02/24)    HPI:     History of Present Illness:  Ida Saxena is a 39 y.o. female here today for routine preop appt. The pt is scheduled for a laparoscopic sleeve gastrectomy on 10/2/24. Pt Pt denies any changes to  history since last examination. All preop requirements completed. No complaints. Ready to proceed.     H.Pylori: +, PRINCESS negative  EGD (6/13/24): normal. No hiatal hernia. PRINCESS negative    Pathology: PREPYLORIC GASTRIC MUCOSA, BIOPSY:   MILD CHRONIC GASTRITIS WITH FOCAL INTESTINAL METAPLASIA.  NO DYSPLASIA.  SEE   COMMENT.     Ht: 67in  Weights:   Trend Weights BMI   Starting 281# 43   Pre-Op 276# 43   2 Week     2 Month     6 Month      1 Year     2 Year               For Adults 20 Years and Older:  BMI Weight Status   Below 18.5 Underweight   18.6-24.9 Normal/Healthy   25.0-29.9 Overweight   30.0 & Above Obese     Ideal Weight Range for Your Height: 5'7" = 121 - 158 lbs      Pertinent History:  HTN - [x] Yes   [] No    [] Resolved  HLD - [] Yes   [x] No    [] Resolved  DM  -  [] Yes   [x] No    [] Resolved  DESTINY - [] Yes   [x] No   [] Resolved  GERD - [] Yes   [] No [x] Resolved  Anticoagulation- [] Yes   [x] No      If yes, type: [] ASA [] Plavix [] Other    Patient Care Team:  Yue Mccabe FNP as PCP - General (Family Medicine)  Gorge Kolb MD as Surgeon (Bariatrics)  Liane Salas MD as Consulting Physician (Neurology)  David Concepcion MD as Consulting Physician (Endocrinology)  Brandon Ariza MD as Consulting Physician (Neurosurgery)     Subjective:     See HPI for details    Constitutional: Denies Change in appetite. Denies Chills. Denies Fever. Denies Night sweats.  Respiratory: Denies Cough. Denies Shortness of breath. Denies Shortness of breath with exertion. Denies Wheezing.  Cardiovascular: Denies Chest pain at rest. Denies Chest pain with exertion. Denies Irregular heartbeat. Denies Palpitations. Denies " Edema.  Gastrointestinal: Denies Abdominal pain. DeniesDiarrhea. Denies Nausea. Denies Vomiting. Denies Hematemesis or Hematochezia.  Genitourinary: Denies Dysuria. Denies Urinary frequency. Denies Urinary urgency. Denies Blood in urine.  Endocrine: Denies Cold intolerance. Denies Excessive thirst. Denies Heat intolerance. Denies Weight loss.   Musculoskeletal: Denies Painful joints. Denies Weakness.  Integumentary: Denies Rash. Denies Itching. Denies Dry skin.  Neurologic: Denies Dizziness. Denies Fainting. Denies Headache.  Psychiatric: Denies Depression. Denies Anxiety. Denies Suicidal/Homicidal ideations.    12 point review of systems conducted, negative except as stated in the history of present illness. See HPI for details.    Review of patient's allergies indicates:  No Known Allergies  Past Medical History:   Diagnosis Date    Abnormal uterine bleeding     Anemia 2022    Encounter for Papanicolaou smear of cervix 2023    Hypertension     Idiopathic intracranial hypertension     Migraines     Missed period 2023    Papilledema     Pituitary microadenoma      Past Surgical History:   Procedure Laterality Date     SECTION      x 3    DILATION AND CURETTAGE OF UTERUS N/A 2023    Procedure: DILATION AND CURETTAGE, UTERUS fx;  Surgeon: Jose Canales Jr., MD;  Location: [facility] OR;  Service: OB/GYN;  Laterality: N/A;    ESOPHAGOGASTRODUODENOSCOPY N/A 2024    Procedure: EGD (ESOPHAGOGASTRODUODENOSCOPY);  Surgeon: Gorge Kolb MD;  Location: [facility] OR;  Service: General;  Laterality: N/A;     Family History   Problem Relation Name Age of Onset    Hypothyroidism Mother      Heart murmur Mother      Heart failure Father      Cancer Father      No Known Problems Daughter      No Known Problems Son      No Known Problems Son      Colon cancer Maternal Grandmother      Breast cancer Other          Mother's sister     Social History     Tobacco Use    Smoking status: Former     Current  "packs/day: 0.00     Average packs/day: 0.5 packs/day for 15.0 years (7.5 ttl pk-yrs)     Types: Cigarettes, Vaping with nicotine     Start date: 1/1/2006     Quit date: 1/1/2021     Years since quitting: 3.7    Smokeless tobacco: Never    Tobacco comments:     Patient states "I stop smoking since 2021"   Substance Use Topics    Alcohol use: Yes     Alcohol/week: 2.0 standard drinks of alcohol     Types: 2 Glasses of wine per week     Comment: occassionally    Drug use: Never      Current Outpatient Medications   Medication Instructions    acetaminophen (TYLENOL) 325 mg Cap Oral    acetaZOLAMIDE (DIAMOX) 500 mg, Oral, 2 times daily    ferrous sulfate 325 mg, Oral, 3 times daily with meals       Objective:     Vital Signs (Most Recent):  Visit Vitals  /79   Pulse 78   Ht 5' 7" (1.702 m)   Wt 125.3 kg (276 lb 3.2 oz)   BMI 43.26 kg/m²       Physical Exam:  General:  Alert and oriented.    Respiratory:  Lungs are clear to auscultation, Respirations are non-labored, Breath sounds are equal.    Cardiovascular:  Normal rate, Regular rhythm, No murmur.    Gastrointestinal:  Soft, Non-tender, Non-distended, Normal bowel sounds        Musculoskeletal:  Normal range of motion, Normal strength.    Integumentary:  Warm, Dry, Pink.    Neurologic:  Alert, Oriented.    Psychiatric:  Cooperative.      Assessment:       ICD-10-CM ICD-9-CM   1. Pre-operative examination  Z01.818 V72.84   2. Hypertension, unspecified type  I10 401.9   3. IIH (idiopathic intracranial hypertension)  G93.2 348.2   4. Encounter for weight loss counseling  Z71.3 V65.3   5. Dietary counseling  Z71.3 V65.3   6. Exercise counseling  Z71.82 V65.41   7. Morbid obesity  E66.01 278.01   8. Encounter for pre-bariatric surgery counseling and education  Z71.89 V65.49     Plan:     1. Pre-operative examination  APTT    Comprehensive Metabolic Panel    CBC Auto Differential      2. Hypertension, unspecified type        3. IIH (idiopathic intracranial " hypertension)        4. Encounter for weight loss counseling        5. Dietary counseling        6. Exercise counseling        7. Morbid obesity        8. Encounter for pre-bariatric surgery counseling and education            Plan:     Ida Saxena has voluntarily decided to undergo laparoscopic sleeve gastrectomy under the care of Gorge Kolb MD. The patient has stated that the performance of this procedure requires major intra-abdominal surgery and therefore carries certain risks. Like all major surgery, there is a chance of death during or immediately following the procedure. The risk varies depending on the age, weight and the medical background of each individual patient.    Additionally, there can be complications as a result of the procedure. General anesthesia is a required and a respirator is necessary during the operation. The risk is greater in patients with a history of smoking, that weigh more than 400 pounds, have a BMI >55 or can not walk up a flight of stairs.    Performance of the surgery requires a long division of the stomach with a special stapler. If leakage occurs, additional surgery or drainage procedures may need to be performed. In addition, since the stomach lies in close proximity to the spleen, the possibility of splenic injury requiring splenectomy may occur.    In addition, complications may occur in wound healing. These may include wound infections, fascial disruptions and hernias which may require future surgical repair. Wound drainage is common in obese patients. In addition to these outlined complications, there can be additional problems which can occur in all major operations. These include infection, unexpected myocardial infarctions and the formation of blood clots leading to pulmonary embolism.    The patient voiced there understanding of the above and that the procedure may be converted to an open procedure if the surgeon feels the surgery can not safely continue  laparoscopically.    The patient also voiced there understanding that patients who undergo this surgery generally lose a significant amount of weight and keep it off, but no operation can guarantee permanent weight reduction.    All questions were answered in regards to surgery. I once again reviewed all the risks / benefits of surgery with the patient in regards to the laparoscopic sleeve gastrectomy , the patient voiced their understanding and wishes to continue.     - preop labs   - Continue preop diet  - All questions answered and pt to sign consent with Gorge Kolb MD AM of surgery    1. Pre-operative examination  -     APTT; Future; Expected date: 09/13/2024  -     Comprehensive Metabolic Panel; Future; Expected date: 09/13/2024  -     CBC Auto Differential; Future; Expected date: 09/13/2024    2. Hypertension, unspecified type  Overview:  BP Readings from Last 3 Encounters:   05/17/23 112/79   03/28/23 121/86   03/16/23 128/89           3. IIH (idiopathic intracranial hypertension)    4. Encounter for weight loss counseling    5. Dietary counseling    6. Exercise counseling    7. Morbid obesity    8. Encounter for pre-bariatric surgery counseling and education       Future Appointments   Date Time Provider Department Center   9/13/2024 10:00 AM INFUSION ROOM 530, [facility] INFUSION [facility] [place]    9/18/2024  8:10 AM Radha Durand RD [facility] [place]   9/18/2024  8:30 AM [facility] [place]   9/20/2024  8:30 AM INFUSION ROOM 538, [facility] INFUSION [facility] [place]   9/25/2024  8:10 AM CLASS, [facility] BARIATRIC SURGERY CLASS [facility] [place]   9/25/2024  1:15 PM RESIDENTS, [facility]YN [facility] [place]    9/27/2024  9:00 AM INFUSION ROOM 536, [facility] INFUSION [facility] CHE[facility] [place]   10/3/2024  8:45 AM Liane Salas MD [facility] NEURO [place]    10/4/2024  9:00 AM INFUSION ROOM 540, [facility] INFUSION [facility] CHE[facility] [place]  Un   10/11/2024  9:00 AM INFUSION ROOM 537, [facility] CHEMOTHERAPY INFUSION [facility] CHEMO [place]   10/18/2024  9:00 AM INFUSION ROOM 538, [facility] CHEMOTHERAPY INFUSION [facility] CHEMO [place]   11/1/2024  9:00 AM [facility] [place]   12/6/2024  9:30 AM [facility] [place]   4/4/2025  9:00 AM [facility] [place]   5/2/2025  8:30 AM Nirmala Bautista FNP [facility] GYN [place]   10/3/2025  9:00 AM [facility] [place]        CAIN Miller

## 2024-09-11 LAB — DEXAMETHASONE SERPL-MCNC: <10 NG/DL

## 2024-09-12 ENCOUNTER — PATIENT MESSAGE (OUTPATIENT)
Dept: BARIATRICS | Facility: HOSPITAL | Age: 39
End: 2024-09-12
Payer: MEDICAID

## 2024-09-13 ENCOUNTER — TELEPHONE (OUTPATIENT)
Dept: BARIATRICS | Facility: HOSPITAL | Age: 39
End: 2024-09-13
Payer: MEDICAID

## 2024-09-13 ENCOUNTER — PATIENT MESSAGE (OUTPATIENT)
Dept: BARIATRICS | Facility: HOSPITAL | Age: 39
End: 2024-09-13
Payer: MEDICAID

## 2024-09-13 ENCOUNTER — PATIENT MESSAGE (OUTPATIENT)
Dept: SURGERY | Facility: CLINIC | Age: 39
End: 2024-09-13

## 2024-09-13 ENCOUNTER — ANESTHESIA EVENT (OUTPATIENT)
Dept: SURGERY | Facility: HOSPITAL | Age: 39
DRG: 621 | End: 2024-09-13
Payer: MEDICAID

## 2024-09-13 ENCOUNTER — INFUSION (OUTPATIENT)
Dept: INFUSION THERAPY | Facility: HOSPITAL | Age: 39
End: 2024-09-13
Attending: INTERNAL MEDICINE
Payer: MEDICAID

## 2024-09-13 ENCOUNTER — OFFICE VISIT (OUTPATIENT)
Dept: SURGERY | Facility: CLINIC | Age: 39
End: 2024-09-13
Payer: MEDICAID

## 2024-09-13 ENCOUNTER — LAB VISIT (OUTPATIENT)
Dept: LAB | Facility: HOSPITAL | Age: 39
End: 2024-09-13
Attending: SURGERY
Payer: MEDICAID

## 2024-09-13 VITALS
OXYGEN SATURATION: 99 % | RESPIRATION RATE: 18 BRPM | HEART RATE: 69 BPM | BODY MASS INDEX: 43.65 KG/M2 | SYSTOLIC BLOOD PRESSURE: 126 MMHG | DIASTOLIC BLOOD PRESSURE: 79 MMHG | TEMPERATURE: 98 F | WEIGHT: 278.13 LBS | HEIGHT: 67 IN

## 2024-09-13 VITALS
HEART RATE: 78 BPM | DIASTOLIC BLOOD PRESSURE: 79 MMHG | HEIGHT: 67 IN | BODY MASS INDEX: 43.35 KG/M2 | SYSTOLIC BLOOD PRESSURE: 112 MMHG | WEIGHT: 276.19 LBS

## 2024-09-13 DIAGNOSIS — D50.8 IRON DEFICIENCY ANEMIA SECONDARY TO INADEQUATE DIETARY IRON INTAKE: Primary | ICD-10-CM

## 2024-09-13 DIAGNOSIS — Z71.82 EXERCISE COUNSELING: ICD-10-CM

## 2024-09-13 DIAGNOSIS — Z71.3 DIETARY COUNSELING: ICD-10-CM

## 2024-09-13 DIAGNOSIS — E66.01 MORBID OBESITY WITH BMI OF 40.0-44.9, ADULT: Primary | ICD-10-CM

## 2024-09-13 DIAGNOSIS — G93.2 IIH (IDIOPATHIC INTRACRANIAL HYPERTENSION): ICD-10-CM

## 2024-09-13 DIAGNOSIS — Z71.3 ENCOUNTER FOR WEIGHT LOSS COUNSELING: ICD-10-CM

## 2024-09-13 DIAGNOSIS — I10 HYPERTENSION, UNSPECIFIED TYPE: ICD-10-CM

## 2024-09-13 DIAGNOSIS — Z71.89 ENCOUNTER FOR PRE-BARIATRIC SURGERY COUNSELING AND EDUCATION: ICD-10-CM

## 2024-09-13 DIAGNOSIS — Z01.818 PRE-OPERATIVE EXAMINATION: Primary | ICD-10-CM

## 2024-09-13 DIAGNOSIS — Z01.818 PRE-OPERATIVE EXAMINATION: ICD-10-CM

## 2024-09-13 DIAGNOSIS — E66.01 MORBID OBESITY: ICD-10-CM

## 2024-09-13 LAB
ALBUMIN SERPL-MCNC: 3.6 G/DL (ref 3.5–5)
ALBUMIN/GLOB SERPL: 1.2 RATIO (ref 1.1–2)
ALP SERPL-CCNC: 73 UNIT/L (ref 40–150)
ALT SERPL-CCNC: 12 UNIT/L (ref 0–55)
ANION GAP SERPL CALC-SCNC: 4 MEQ/L
APTT PPP: 24.7 SECONDS (ref 23.2–33.7)
AST SERPL-CCNC: 15 UNIT/L (ref 5–34)
BASOPHILS # BLD AUTO: 0.03 X10(3)/MCL
BASOPHILS NFR BLD AUTO: 0.4 %
BILIRUB SERPL-MCNC: 0.3 MG/DL
BUN SERPL-MCNC: 12.9 MG/DL (ref 7–18.7)
CALCIUM SERPL-MCNC: 9.1 MG/DL (ref 8.4–10.2)
CHLORIDE SERPL-SCNC: 113 MMOL/L (ref 98–107)
CO2 SERPL-SCNC: 23 MMOL/L (ref 22–29)
CREAT SERPL-MCNC: 0.77 MG/DL (ref 0.55–1.02)
CREAT/UREA NIT SERPL: 17
EOSINOPHIL # BLD AUTO: 0.12 X10(3)/MCL (ref 0–0.9)
EOSINOPHIL NFR BLD AUTO: 1.7 %
ERYTHROCYTE [DISTWIDTH] IN BLOOD BY AUTOMATED COUNT: 14 % (ref 11.5–17)
GFR SERPLBLD CREATININE-BSD FMLA CKD-EPI: >60 ML/MIN/1.73/M2
GLOBULIN SER-MCNC: 2.9 GM/DL (ref 2.4–3.5)
GLUCOSE SERPL-MCNC: 84 MG/DL (ref 74–100)
HCT VFR BLD AUTO: 32.7 % (ref 37–47)
HGB BLD-MCNC: 10.1 G/DL (ref 12–16)
IMM GRANULOCYTES # BLD AUTO: 0.07 X10(3)/MCL (ref 0–0.04)
IMM GRANULOCYTES NFR BLD AUTO: 1 %
LYMPHOCYTES # BLD AUTO: 1.36 X10(3)/MCL (ref 0.6–4.6)
LYMPHOCYTES NFR BLD AUTO: 18.9 %
MCH RBC QN AUTO: 27.7 PG (ref 27–31)
MCHC RBC AUTO-ENTMCNC: 30.9 G/DL (ref 33–36)
MCV RBC AUTO: 89.8 FL (ref 80–94)
MONOCYTES # BLD AUTO: 0.48 X10(3)/MCL (ref 0.1–1.3)
MONOCYTES NFR BLD AUTO: 6.7 %
NEUTROPHILS # BLD AUTO: 5.14 X10(3)/MCL (ref 2.1–9.2)
NEUTROPHILS NFR BLD AUTO: 71.3 %
NRBC BLD AUTO-RTO: 0 %
PLATELET # BLD AUTO: 291 X10(3)/MCL (ref 130–400)
PMV BLD AUTO: 9.3 FL (ref 7.4–10.4)
POTASSIUM SERPL-SCNC: 4 MMOL/L (ref 3.5–5.1)
PROT SERPL-MCNC: 6.5 GM/DL (ref 6.4–8.3)
RBC # BLD AUTO: 3.64 X10(6)/MCL (ref 4.2–5.4)
SODIUM SERPL-SCNC: 140 MMOL/L (ref 136–145)
WBC # BLD AUTO: 7.2 X10(3)/MCL (ref 4.5–11.5)

## 2024-09-13 PROCEDURE — 36415 COLL VENOUS BLD VENIPUNCTURE: CPT

## 2024-09-13 PROCEDURE — 25000003 PHARM REV CODE 250: Performed by: NURSE PRACTITIONER

## 2024-09-13 PROCEDURE — 63600175 PHARM REV CODE 636 W HCPCS: Performed by: NURSE PRACTITIONER

## 2024-09-13 PROCEDURE — 96374 THER/PROPH/DIAG INJ IV PUSH: CPT

## 2024-09-13 RX ORDER — SODIUM CHLORIDE 0.9 % (FLUSH) 0.9 %
10 SYRINGE (ML) INJECTION
Status: DISCONTINUED | OUTPATIENT
Start: 2024-09-13 | End: 2024-09-13 | Stop reason: HOSPADM

## 2024-09-13 RX ORDER — DIPHENHYDRAMINE HYDROCHLORIDE 50 MG/ML
50 INJECTION INTRAMUSCULAR; INTRAVENOUS ONCE AS NEEDED
OUTPATIENT
Start: 2024-09-20

## 2024-09-13 RX ORDER — DIPHENHYDRAMINE HYDROCHLORIDE 50 MG/ML
50 INJECTION INTRAMUSCULAR; INTRAVENOUS ONCE AS NEEDED
Status: DISCONTINUED | OUTPATIENT
Start: 2024-09-13 | End: 2024-09-13 | Stop reason: HOSPADM

## 2024-09-13 RX ORDER — SODIUM CHLORIDE 0.9 % (FLUSH) 0.9 %
10 SYRINGE (ML) INJECTION
OUTPATIENT
Start: 2024-09-20

## 2024-09-13 RX ORDER — ACETAZOLAMIDE 500 MG/1
500 CAPSULE, EXTENDED RELEASE ORAL 2 TIMES DAILY
COMMUNITY

## 2024-09-13 RX ORDER — EPINEPHRINE 0.3 MG/.3ML
0.3 INJECTION SUBCUTANEOUS ONCE AS NEEDED
Status: DISCONTINUED | OUTPATIENT
Start: 2024-09-13 | End: 2024-09-13 | Stop reason: HOSPADM

## 2024-09-13 RX ORDER — EPINEPHRINE 0.3 MG/.3ML
0.3 INJECTION SUBCUTANEOUS ONCE AS NEEDED
OUTPATIENT
Start: 2024-09-20

## 2024-09-13 RX ORDER — FERROUS SULFATE 325(65) MG
325 TABLET, DELAYED RELEASE (ENTERIC COATED) ORAL
COMMUNITY

## 2024-09-13 RX ADMIN — SODIUM CHLORIDE: 9 INJECTION, SOLUTION INTRAVENOUS at 10:09

## 2024-09-13 RX ADMIN — IRON SUCROSE 200 MG: 20 INJECTION, SOLUTION INTRAVENOUS at 10:09

## 2024-09-13 NOTE — NURSING
1036 Patient is here for Venofer #1/3. Plan was changed from 100mgs q wk x 6 doses to 200mgs q wk x 3 doses per Juliana Arias NP & Everton Diop [facility].   1137 Infusion completed w/o incident. Patient education printout of Venofer given to patient.

## 2024-09-13 NOTE — H&P (VIEW-ONLY)
"  Patient ID: [id number]   Chief Complaint: Pre-op Exam (VSG 10/02/24)    HPI:     History of Present Illness:  Ida Saxena is a 39 y.o. female here today for routine preop appt. The pt is scheduled for a laparoscopic sleeve gastrectomy on 10/2/24. Pt Pt denies any changes to  history since last examination. All preop requirements completed. No complaints. Ready to proceed.     H.Pylori: +, PRINCESS negative  EGD (6/13/24): normal. No hiatal hernia. PRINCESS negative    Pathology: PREPYLORIC GASTRIC MUCOSA, BIOPSY:   MILD CHRONIC GASTRITIS WITH FOCAL INTESTINAL METAPLASIA.  NO DYSPLASIA.  SEE   COMMENT.     Ht: 67in  Weights:   Trend Weights BMI   Starting 281# 43   Pre-Op 276# 43   2 Week     2 Month     6 Month      1 Year     2 Year               For Adults 20 Years and Older:  BMI Weight Status   Below 18.5 Underweight   18.6-24.9 Normal/Healthy   25.0-29.9 Overweight   30.0 & Above Obese     Ideal Weight Range for Your Height: 5'7" = 121 - 158 lbs      Pertinent History:  HTN - [x] Yes   [] No    [] Resolved  HLD - [] Yes   [x] No    [] Resolved  DM  -  [] Yes   [x] No    [] Resolved  DESTINY - [] Yes   [x] No   [] Resolved  GERD - [] Yes   [] No [x] Resolved  Anticoagulation- [] Yes   [x] No      If yes, type: [] ASA [] Plavix [] Other    Patient Care Team:  Yue Mccabe FNP as PCP - General (Family Medicine)  Gorge Kolb MD as Surgeon (Bariatrics)  Liane Salas MD as Consulting Physician (Neurology)  David Concepcion MD as Consulting Physician (Endocrinology)  Brandon Ariza MD as Consulting Physician (Neurosurgery)     Subjective:     See HPI for details    Constitutional: Denies Change in appetite. Denies Chills. Denies Fever. Denies Night sweats.  Respiratory: Denies Cough. Denies Shortness of breath. Denies Shortness of breath with exertion. Denies Wheezing.  Cardiovascular: Denies Chest pain at rest. Denies Chest pain with exertion. Denies Irregular heartbeat. Denies Palpitations. Denies " Pt currently in BR providing a urine specimen    Edema.  Gastrointestinal: Denies Abdominal pain. DeniesDiarrhea. Denies Nausea. Denies Vomiting. Denies Hematemesis or Hematochezia.  Genitourinary: Denies Dysuria. Denies Urinary frequency. Denies Urinary urgency. Denies Blood in urine.  Endocrine: Denies Cold intolerance. Denies Excessive thirst. Denies Heat intolerance. Denies Weight loss.   Musculoskeletal: Denies Painful joints. Denies Weakness.  Integumentary: Denies Rash. Denies Itching. Denies Dry skin.  Neurologic: Denies Dizziness. Denies Fainting. Denies Headache.  Psychiatric: Denies Depression. Denies Anxiety. Denies Suicidal/Homicidal ideations.    12 point review of systems conducted, negative except as stated in the history of present illness. See HPI for details.    Review of patient's allergies indicates:  No Known Allergies  Past Medical History:   Diagnosis Date    Abnormal uterine bleeding     Anemia 2022    Encounter for Papanicolaou smear of cervix 2023    Hypertension     Idiopathic intracranial hypertension     Migraines     Missed period 2023    Papilledema     Pituitary microadenoma      Past Surgical History:   Procedure Laterality Date     SECTION      x 3    DILATION AND CURETTAGE OF UTERUS N/A 2023    Procedure: DILATION AND CURETTAGE, UTERUS fx;  Surgeon: Jose Canales Jr., MD;  Location: [facility] OR;  Service: OB/GYN;  Laterality: N/A;    ESOPHAGOGASTRODUODENOSCOPY N/A 2024    Procedure: EGD (ESOPHAGOGASTRODUODENOSCOPY);  Surgeon: Gorge Kolb MD;  Location: [facility] OR;  Service: General;  Laterality: N/A;     Family History   Problem Relation Name Age of Onset    Hypothyroidism Mother      Heart murmur Mother      Heart failure Father      Cancer Father      No Known Problems Daughter      No Known Problems Son      No Known Problems Son      Colon cancer Maternal Grandmother      Breast cancer Other          Mother's sister     Social History     Tobacco Use    Smoking status: Former     Current  "packs/day: 0.00     Average packs/day: 0.5 packs/day for 15.0 years (7.5 ttl pk-yrs)     Types: Cigarettes, Vaping with nicotine     Start date: 1/1/2006     Quit date: 1/1/2021     Years since quitting: 3.7    Smokeless tobacco: Never    Tobacco comments:     Patient states "I stop smoking since 2021"   Substance Use Topics    Alcohol use: Yes     Alcohol/week: 2.0 standard drinks of alcohol     Types: 2 Glasses of wine per week     Comment: occassionally    Drug use: Never      Current Outpatient Medications   Medication Instructions    acetaminophen (TYLENOL) 325 mg Cap Oral    acetaZOLAMIDE (DIAMOX) 500 mg, Oral, 2 times daily    ferrous sulfate 325 mg, Oral, 3 times daily with meals       Objective:     Vital Signs (Most Recent):  Visit Vitals  /79   Pulse 78   Ht 5' 7" (1.702 m)   Wt 125.3 kg (276 lb 3.2 oz)   BMI 43.26 kg/m²       Physical Exam:  General:  Alert and oriented.    Respiratory:  Lungs are clear to auscultation, Respirations are non-labored, Breath sounds are equal.    Cardiovascular:  Normal rate, Regular rhythm, No murmur.    Gastrointestinal:  Soft, Non-tender, Non-distended, Normal bowel sounds        Musculoskeletal:  Normal range of motion, Normal strength.    Integumentary:  Warm, Dry, Pink.    Neurologic:  Alert, Oriented.    Psychiatric:  Cooperative.      Assessment:       ICD-10-CM ICD-9-CM   1. Pre-operative examination  Z01.818 V72.84   2. Hypertension, unspecified type  I10 401.9   3. IIH (idiopathic intracranial hypertension)  G93.2 348.2   4. Encounter for weight loss counseling  Z71.3 V65.3   5. Dietary counseling  Z71.3 V65.3   6. Exercise counseling  Z71.82 V65.41   7. Morbid obesity  E66.01 278.01   8. Encounter for pre-bariatric surgery counseling and education  Z71.89 V65.49     Plan:     1. Pre-operative examination  APTT    Comprehensive Metabolic Panel    CBC Auto Differential      2. Hypertension, unspecified type        3. IIH (idiopathic intracranial " hypertension)        4. Encounter for weight loss counseling        5. Dietary counseling        6. Exercise counseling        7. Morbid obesity        8. Encounter for pre-bariatric surgery counseling and education            Plan:     Ida Saxena has voluntarily decided to undergo laparoscopic sleeve gastrectomy under the care of Gorge Kolb MD. The patient has stated that the performance of this procedure requires major intra-abdominal surgery and therefore carries certain risks. Like all major surgery, there is a chance of death during or immediately following the procedure. The risk varies depending on the age, weight and the medical background of each individual patient.    Additionally, there can be complications as a result of the procedure. General anesthesia is a required and a respirator is necessary during the operation. The risk is greater in patients with a history of smoking, that weigh more than 400 pounds, have a BMI >55 or can not walk up a flight of stairs.    Performance of the surgery requires a long division of the stomach with a special stapler. If leakage occurs, additional surgery or drainage procedures may need to be performed. In addition, since the stomach lies in close proximity to the spleen, the possibility of splenic injury requiring splenectomy may occur.    In addition, complications may occur in wound healing. These may include wound infections, fascial disruptions and hernias which may require future surgical repair. Wound drainage is common in obese patients. In addition to these outlined complications, there can be additional problems which can occur in all major operations. These include infection, unexpected myocardial infarctions and the formation of blood clots leading to pulmonary embolism.    The patient voiced there understanding of the above and that the procedure may be converted to an open procedure if the surgeon feels the surgery can not safely continue  laparoscopically.    The patient also voiced there understanding that patients who undergo this surgery generally lose a significant amount of weight and keep it off, but no operation can guarantee permanent weight reduction.    All questions were answered in regards to surgery. I once again reviewed all the risks / benefits of surgery with the patient in regards to the laparoscopic sleeve gastrectomy , the patient voiced their understanding and wishes to continue.     - preop labs   - Continue preop diet  - All questions answered and pt to sign consent with Gorge Kolb MD AM of surgery    1. Pre-operative examination  -     APTT; Future; Expected date: 09/13/2024  -     Comprehensive Metabolic Panel; Future; Expected date: 09/13/2024  -     CBC Auto Differential; Future; Expected date: 09/13/2024    2. Hypertension, unspecified type  Overview:  BP Readings from Last 3 Encounters:   05/17/23 112/79   03/28/23 121/86   03/16/23 128/89           3. IIH (idiopathic intracranial hypertension)    4. Encounter for weight loss counseling    5. Dietary counseling    6. Exercise counseling    7. Morbid obesity    8. Encounter for pre-bariatric surgery counseling and education       Future Appointments   Date Time Provider Department Center   9/13/2024 10:00 AM INFUSION ROOM 530, [facility] INFUSION [facility] CHEMO [place]    9/18/2024  8:10 AM Radha Durand RD [facility] [place]   9/18/2024  8[facility]30 [facility] [place]   9/20/2024  8:30 AM INFUSION ROOM 538, [facility] CHEMOTHERAP[facility] INFUSION [facility] CHEMO [place] Un   9/25/2024  8:10 AM CLASS, [facility] BARIATRIC SURGERY CLASS [facility] [place]   9/25/2024  1:15 PM RESIDENTS, [facility] GYN [facility] GYN [place]    9/27/2024  9:00 AM INFUSION ROOM 536, [facility] CHEMOTHERAPY INFUSION [facility] CHEMO [place]   10/3/2024  8:45 AM Liane Salas MD [facility] NEURO [place]    10/4/2024  9:00 AM INFUSION ROOM 540, [facility] CHEMOTHERAPY INFUSION [facility] CHEMO [place]  Un   10/11/2024  9:00 AM INFUSION ROOM 537, UL CHEMOTHERAPY INFUSION [facility] CHEMO [place]   10/18/2024  9:00 AM INFUSION ROOM 538, UL CHEMOTHERAPY INFUSION [facility] CHEMO [place]   11/1/2024  9:00 AM [facility] [place]   12/6/2024  9:30 AM [facility] [place]   4/4/2025  9:00 AM [facility] [place]   5/2/2025  8:30 AM Nirmala Bautista FNP [facility] GYN [place]   10/3/2025  9:00 AM Juliana [facility]as F[facility] [place]        CAIN Miller

## 2024-09-13 NOTE — TELEPHONE ENCOUNTER
Pt called to ask questions about pre-op diet. Discussed diet below in detail and told pt the main goal of this diet is to have correct portions of food items listed by measuring on food scale, having protein at each meal, and eating frequent meals throughout the day. Pt understood and said she would call if she had anymore questions.     BARIATRIC PRE-OP DIET     Consists of 3 meals and 2 snacks:     AM MEAL  4 oz of skim or 1% milk (1/2 cup)  2 oz of fruit (1/4 cup)  4 oz protein    MID MORNING MEAL  1 oz nuts  4 oz of fruit or 1/2 oz dried fruit    NOON MEAL  4 oz of protein  2 cups of salad or non-starchy vegetables  1 tbsp of healthful fat    AFTERNOON MEAL  6 oz light yogurt- plain or vanilla  2 oz of fruit (1/4 cup)    PM MEAL  4 oz of protein  2 cups of salad or non-starchy vegetables  1 tbsp of healthful fat    *eliminate fruits 3-5 days prior to surgery*

## 2024-09-13 NOTE — H&P
"  Patient ID: [id number]   Chief Complaint: Pre-op Exam (VSG 10/02/24)    HPI:     History of Present Illness:  Ida Saxena is a 39 y.o. female here today for routine preop appt. The pt is scheduled for a laparoscopic sleeve gastrectomy on 10/2/24. Pt Pt denies any changes to  history since last examination. All preop requirements completed. No complaints. Ready to proceed.     H.Pylori: +, PRINCESS negative  EGD (6/13/24): normal. No hiatal hernia. PRINCESS negative    Pathology: PREPYLORIC GASTRIC MUCOSA, BIOPSY:   MILD CHRONIC GASTRITIS WITH FOCAL INTESTINAL METAPLASIA.  NO DYSPLASIA.  SEE   COMMENT.     Ht: 67in  Weights:   Trend Weights BMI   Starting 281# 43   Pre-Op 276# 43   2 Week     2 Month     6 Month      1 Year     2 Year               For Adults 20 Years and Older:  BMI Weight Status   Below 18.5 Underweight   18.6-24.9 Normal/Healthy   25.0-29.9 Overweight   30.0 & Above Obese     Ideal Weight Range for Your Height: 5'7" = 121 - 158 lbs      Pertinent History:  HTN - [x] Yes   [] No    [] Resolved  HLD - [] Yes   [x] No    [] Resolved  DM  -  [] Yes   [x] No    [] Resolved  DESTINY - [] Yes   [x] No   [] Resolved  GERD - [] Yes   [] No [x] Resolved  Anticoagulation- [] Yes   [x] No      If yes, type: [] ASA [] Plavix [] Other    Patient Care Team:  Yue Mccabe FNP as PCP - General (Family Medicine)  Gorge Kolb MD as Surgeon (Bariatrics)  Liane Salas MD as Consulting Physician (Neurology)  David Concepcion MD as Consulting Physician (Endocrinology)  Brandon Ariza MD as Consulting Physician (Neurosurgery)     Subjective:     See HPI for details    Constitutional: Denies Change in appetite. Denies Chills. Denies Fever. Denies Night sweats.  Respiratory: Denies Cough. Denies Shortness of breath. Denies Shortness of breath with exertion. Denies Wheezing.  Cardiovascular: Denies Chest pain at rest. Denies Chest pain with exertion. Denies Irregular heartbeat. Denies Palpitations. Denies " Edema.  Gastrointestinal: Denies Abdominal pain. DeniesDiarrhea. Denies Nausea. Denies Vomiting. Denies Hematemesis or Hematochezia.  Genitourinary: Denies Dysuria. Denies Urinary frequency. Denies Urinary urgency. Denies Blood in urine.  Endocrine: Denies Cold intolerance. Denies Excessive thirst. Denies Heat intolerance. Denies Weight loss.   Musculoskeletal: Denies Painful joints. Denies Weakness.  Integumentary: Denies Rash. Denies Itching. Denies Dry skin.  Neurologic: Denies Dizziness. Denies Fainting. Denies Headache.  Psychiatric: Denies Depression. Denies Anxiety. Denies Suicidal/Homicidal ideations.    12 point review of systems conducted, negative except as stated in the history of present illness. See HPI for details.    Review of patient's allergies indicates:  No Known Allergies  Past Medical History:   Diagnosis Date    Abnormal uterine bleeding     Anemia 2022    Encounter for Papanicolaou smear of cervix 2023    Hypertension     Idiopathic intracranial hypertension     Migraines     Missed period 2023    Papilledema     Pituitary microadenoma      Past Surgical History:   Procedure Laterality Date     SECTION      x 3    DILATION AND CURETTAGE OF UTERUS N/A 2023    Procedure: DILATION AND CURETTAGE, UTERUS fx;  Surgeon: Jose Canales Jr., MD;  Location: [facility] OR;  Service: OB/GYN;  Laterality: N/A;    ESOPHAGOGASTRODUODENOSCOPY N/A 2024    Procedure: EGD (ESOPHAGOGASTRODUODENOSCOPY);  Surgeon: Gorge Kolb MD;  Location: [facility] OR;  Service: General;  Laterality: N/A;     Family History   Problem Relation Name Age of Onset    Hypothyroidism Mother      Heart murmur Mother      Heart failure Father      Cancer Father      No Known Problems Daughter      No Known Problems Son      No Known Problems Son      Colon cancer Maternal Grandmother      Breast cancer Other          Mother's sister     Social History     Tobacco Use    Smoking status: Former     Current  "packs/day: 0.00     Average packs/day: 0.5 packs/day for 15.0 years (7.5 ttl pk-yrs)     Types: Cigarettes, Vaping with nicotine     Start date: 1/1/2006     Quit date: 1/1/2021     Years since quitting: 3.7    Smokeless tobacco: Never    Tobacco comments:     Patient states "I stop smoking since 2021"   Substance Use Topics    Alcohol use: Yes     Alcohol/week: 2.0 standard drinks of alcohol     Types: 2 Glasses of wine per week     Comment: occassionally    Drug use: Never      Current Outpatient Medications   Medication Instructions    acetaminophen (TYLENOL) 325 mg Cap Oral    acetaZOLAMIDE (DIAMOX) 500 mg, Oral, 2 times daily    ferrous sulfate 325 mg, Oral, 3 times daily with meals       Objective:     Vital Signs (Most Recent):  Visit Vitals  /79   Pulse 78   Ht 5' 7" (1.702 m)   Wt 125.3 kg (276 lb 3.2 oz)   BMI 43.26 kg/m²       Physical Exam:  General:  Alert and oriented.    Respiratory:  Lungs are clear to auscultation, Respirations are non-labored, Breath sounds are equal.    Cardiovascular:  Normal rate, Regular rhythm, No murmur.    Gastrointestinal:  Soft, Non-tender, Non-distended, Normal bowel sounds        Musculoskeletal:  Normal range of motion, Normal strength.    Integumentary:  Warm, Dry, Pink.    Neurologic:  Alert, Oriented.    Psychiatric:  Cooperative.      Assessment:       ICD-10-CM ICD-9-CM   1. Pre-operative examination  Z01.818 V72.84   2. Hypertension, unspecified type  I10 401.9   3. IIH (idiopathic intracranial hypertension)  G93.2 348.2   4. Encounter for weight loss counseling  Z71.3 V65.3   5. Dietary counseling  Z71.3 V65.3   6. Exercise counseling  Z71.82 V65.41   7. Morbid obesity  E66.01 278.01   8. Encounter for pre-bariatric surgery counseling and education  Z71.89 V65.49     Plan:     1. Pre-operative examination  APTT    Comprehensive Metabolic Panel    CBC Auto Differential      2. Hypertension, unspecified type        3. IIH (idiopathic intracranial " hypertension)        4. Encounter for weight loss counseling        5. Dietary counseling        6. Exercise counseling        7. Morbid obesity        8. Encounter for pre-bariatric surgery counseling and education            Plan:     Ida Saxena has voluntarily decided to undergo laparoscopic sleeve gastrectomy under the care of Gorge Kolb MD. The patient has stated that the performance of this procedure requires major intra-abdominal surgery and therefore carries certain risks. Like all major surgery, there is a chance of death during or immediately following the procedure. The risk varies depending on the age, weight and the medical background of each individual patient.    Additionally, there can be complications as a result of the procedure. General anesthesia is a required and a respirator is necessary during the operation. The risk is greater in patients with a history of smoking, that weigh more than 400 pounds, have a BMI >55 or can not walk up a flight of stairs.    Performance of the surgery requires a long division of the stomach with a special stapler. If leakage occurs, additional surgery or drainage procedures may need to be performed. In addition, since the stomach lies in close proximity to the spleen, the possibility of splenic injury requiring splenectomy may occur.    In addition, complications may occur in wound healing. These may include wound infections, fascial disruptions and hernias which may require future surgical repair. Wound drainage is common in obese patients. In addition to these outlined complications, there can be additional problems which can occur in all major operations. These include infection, unexpected myocardial infarctions and the formation of blood clots leading to pulmonary embolism.    The patient voiced there understanding of the above and that the procedure may be converted to an open procedure if the surgeon feels the surgery can not safely continue  laparoscopically.    The patient also voiced there understanding that patients who undergo this surgery generally lose a significant amount of weight and keep it off, but no operation can guarantee permanent weight reduction.    All questions were answered in regards to surgery. I once again reviewed all the risks / benefits of surgery with the patient in regards to the laparoscopic sleeve gastrectomy , the patient voiced their understanding and wishes to continue.     - preop labs   - Continue preop diet  - All questions answered and pt to sign consent with Gorge Kolb MD AM of surgery    1. Pre-operative examination  -     APTT; Future; Expected date: 09/13/2024  -     Comprehensive Metabolic Panel; Future; Expected date: 09/13/2024  -     CBC Auto Differential; Future; Expected date: 09/13/2024    2. Hypertension, unspecified type  Overview:  BP Readings from Last 3 Encounters:   05/17/23 112/79   03/28/23 121/86   03/16/23 128/89           3. IIH (idiopathic intracranial hypertension)    4. Encounter for weight loss counseling    5. Dietary counseling    6. Exercise counseling    7. Morbid obesity    8. Encounter for pre-bariatric surgery counseling and education       Future Appointments   Date Time Provider Department Center   9/13/2024 10:00 AM INFUSION ROOM 530, [facility] INFUSION [facility] [place]    9/18/2024  8:10 AM Radha Durand RD [facility] [place]   9/18/2024  8:30 [facility] [place]   9/20/2024  8:30 AM INFUSION ROOM 538, [facility] INFUSION [facility] [place]   9/25/2024  8:10 AM CLASS, [facility] BARIATRIC SURGERY CLASS [facility] [place]   9/25/2024  1:15 PM RESIDENTS, [facility] GYN [facility] GYN [place]    9/27/2024  9:00 AM INFUSION ROOM 536, [facility] INFUSION [facility] [place]   10/3/2024  8:45 AM Liane Salas MD [facility] NEURO [place]    10/4/2024  9:00 AM INFUSION ROOM 540, [facility] INFUSION [facility] [place]  [place]   10/11/2024  9:00 AM INFUSION ROOM 537, [facility] CHEMOTHERAPY INFUSION [facility] CHE[facility] [place]   10/18/2024  9:00 AM INFUSION ROOM 538, [facility] CHEMOTHERAPY INFUSION [facility] CHE[facility] [place]   11/1/2024  9:00 AM [facility] [place]   12/6/2024  9:30 AM [facility] [place]   4/4/2025  9:00 AM [facility] [place]   5/2/2025  8:30 AM Nirmala Bautista FNP [facility] GYN [place]   10/3/2025  9:00 AM Juliana Arias FNP [facility]        CAIN Miller

## 2024-09-13 NOTE — ANESTHESIA PREPROCEDURE EVALUATION
Ida Saxena is a 39 y.o. female PRESENTING FOR GASTRECTOMY, SLEEVE, LAPAROSCOPIC (Abdomen) with a history of   -MORBID OBESITY, BMI 44  -FORMER SMOKER; VAPES  -HTN  -PITUITARY MICROADENOMA  -PAPILLEDEMA   -IIH       BETA-BLOCKER: NONE    New Orders for Anesthesia: UPT    Patient Active Problem List   Diagnosis    Anemia    Class 3 severe obesity due to excess calories with serious comorbidity and body mass index (BMI) of 45.0 to 49.9 in adult    Former tobacco use    Hypertension    Migraine with aura and without status migrainosus, not intractable    Numbness and tingling in right hand    Bilateral foot pain    Vaginal odor    Blood in stool    Vitamin D deficiency    Papilledema of both eyes    Other complicated headache syndrome    Unspecified disorder of binocular movement    Fatigue    Medication overuse headache    IIH (idiopathic intracranial hypertension)    Pituitary microadenoma    CRUZITO (iron deficiency anemia)       Pre-op Assessment    I have reviewed the NPO Status.      Review of Systems  Anesthesia Hx:  No problems with previous Anesthesia                Social:  Vaping       Cardiovascular:     Hypertension, well controlled                                        Pulmonary:  Pulmonary Normal                       Renal/:  Renal/ Normal                 Hepatic/GI:  Hepatic/GI Normal                 Neurological:  Neurology Normal                                      Endocrine:        Morbid Obesity / BMI > 40    Vitals:    10/02/24 0610 10/02/24 0626 10/02/24 0714 10/02/24 0831   BP:  125/82 125/82 (!) 117/92   Pulse:  72  85   Resp:    18   Temp:  36.7 °C (98 °F)  35.8 °C (96.4 °F)   TempSrc:  Oral  Temporal   SpO2:  99%  98%   Weight: 115.9 kg (255 lb 9.6 oz)      Height:             Physical Exam  General: Alert, Cooperative and Well nourished    Airway:  Mallampati: II   Mouth Opening: Normal  TM Distance: Normal  Tongue: Normal  Neck ROM: Normal ROM    Dental:  Braces  Loose wire and  bracket on lower braces; pt informed about potential damage and potential for dislodgement  Chest/Lungs:  Clear to auscultation, Normal Respiratory Rate    Heart:  Rate: Normal  Rhythm: Regular Rhythm  Sounds: Normal       Latest Reference Range & Units 10/02/24 06:36   hCG Qualitative, Urine Negative  Negative     Lab Results   Component Value Date    WBC 7.20 09/13/2024    HGB 10.1 (L) 09/13/2024    HCT 32.7 (L) 09/13/2024    MCV 89.8 09/13/2024     09/13/2024     CMP  Sodium   Date Value Ref Range Status   09/13/2024 140 136 - 145 mmol/L Final     Potassium   Date Value Ref Range Status   09/13/2024 4.0 3.5 - 5.1 mmol/L Final     Chloride   Date Value Ref Range Status   09/13/2024 113 (H) 98 - 107 mmol/L Final     CO2   Date Value Ref Range Status   09/13/2024 23 22 - 29 mmol/L Final     Blood Urea Nitrogen   Date Value Ref Range Status   09/13/2024 12.9 7.0 - 18.7 mg/dL Final     Creatinine   Date Value Ref Range Status   09/13/2024 0.77 0.55 - 1.02 mg/dL Final     Calcium   Date Value Ref Range Status   09/13/2024 9.1 8.4 - 10.2 mg/dL Final     Albumin   Date Value Ref Range Status   09/13/2024 3.6 3.5 - 5.0 g/dL Final     Bilirubin Total   Date Value Ref Range Status   09/13/2024 0.3 <=1.5 mg/dL Final     ALP   Date Value Ref Range Status   09/13/2024 73 40 - 150 unit/L Final     AST   Date Value Ref Range Status   09/13/2024 15 5 - 34 unit/L Final     ALT   Date Value Ref Range Status   09/13/2024 12 0 - 55 unit/L Final     eGFR   Date Value Ref Range Status   09/13/2024 >60 mL/min/1.73/m2 Final       NEUROSURGERY OV 5/10/2024      NEURO OV 4/3/2024          Anesthesia Plan  Type of Anesthesia, risks & benefits discussed:    Anesthesia Type: Gen ETT  Intra-op Monitoring Plan: Standard ASA Monitors  Post Op Pain Control Plan: IV/PO Opioids PRN  Induction:  IV  Airway Plan: Direct  Informed Consent: Informed consent signed with the Patient and all parties understand the risks and agree with anesthesia  plan.  All questions answered.   ASA Score: 3  Day of Surgery Review of History & Physical: H&P Update referred to the surgeon/provider.    Ready For Surgery From Anesthesia Perspective.     .

## 2024-09-16 ENCOUNTER — TELEPHONE (OUTPATIENT)
Dept: SURGERY | Facility: CLINIC | Age: 39
End: 2024-09-16
Payer: MEDICAID

## 2024-09-16 NOTE — TELEPHONE ENCOUNTER
Patient phoned to inform us that Dr. Concepcion prescribed Cadergoline for her to begin taking to shrink her tumor. She wanted to inform us and to assure it was safe to take before surgery. Advised patient she could take the medication.

## 2024-09-18 ENCOUNTER — CLINICAL SUPPORT (OUTPATIENT)
Dept: BARIATRICS | Facility: HOSPITAL | Age: 39
End: 2024-09-18

## 2024-09-18 VITALS — BODY MASS INDEX: 41.99 KG/M2 | WEIGHT: 268.13 LBS

## 2024-09-18 DIAGNOSIS — E66.01 MORBID OBESITY WITH BMI OF 40.0-44.9, ADULT: Primary | ICD-10-CM

## 2024-09-18 NOTE — PROGRESS NOTES
"Pt attended pre-op visit with bariatric team and completed questionnaire. Pre- and immediate post-op guidelines were reviewed. Pt confirmed knowledge and expressed understanding.     Height:   Ht Readings from Last 1 Encounters:   09/13/24 5' 7" (1.702 m)      Weight:   Wt Readings from Last 3 Encounters:   09/18/24 1534 121.6 kg (268 lb 1.6 oz)   09/13/24 1036 126.1 kg (278 lb 1.6 oz)   09/13/24 0913 125.3 kg (276 lb 3.2 oz)      BMI:   BMI Readings from Last 1 Encounters:   09/18/24 41.99 kg/m²        Weight loss since pre-op class: lost 9#    "

## 2024-09-20 ENCOUNTER — INFUSION (OUTPATIENT)
Dept: INFUSION THERAPY | Facility: HOSPITAL | Age: 39
End: 2024-09-20
Attending: INTERNAL MEDICINE
Payer: MEDICAID

## 2024-09-20 VITALS
OXYGEN SATURATION: 99 % | SYSTOLIC BLOOD PRESSURE: 120 MMHG | HEIGHT: 67 IN | RESPIRATION RATE: 20 BRPM | HEART RATE: 75 BPM | BODY MASS INDEX: 42.21 KG/M2 | WEIGHT: 268.94 LBS | DIASTOLIC BLOOD PRESSURE: 84 MMHG | TEMPERATURE: 98 F

## 2024-09-20 DIAGNOSIS — D50.8 IRON DEFICIENCY ANEMIA SECONDARY TO INADEQUATE DIETARY IRON INTAKE: Primary | ICD-10-CM

## 2024-09-20 PROCEDURE — 25000003 PHARM REV CODE 250: Performed by: NURSE PRACTITIONER

## 2024-09-20 PROCEDURE — 96374 THER/PROPH/DIAG INJ IV PUSH: CPT

## 2024-09-20 PROCEDURE — 63600175 PHARM REV CODE 636 W HCPCS: Performed by: NURSE PRACTITIONER

## 2024-09-20 RX ORDER — SODIUM CHLORIDE 0.9 % (FLUSH) 0.9 %
10 SYRINGE (ML) INJECTION
OUTPATIENT
Start: 2024-09-27

## 2024-09-20 RX ORDER — EPINEPHRINE 0.3 MG/.3ML
0.3 INJECTION SUBCUTANEOUS ONCE AS NEEDED
OUTPATIENT
Start: 2024-09-27

## 2024-09-20 RX ORDER — SODIUM CHLORIDE 0.9 % (FLUSH) 0.9 %
10 SYRINGE (ML) INJECTION
Status: DISCONTINUED | OUTPATIENT
Start: 2024-09-20 | End: 2024-09-20 | Stop reason: HOSPADM

## 2024-09-20 RX ORDER — DIPHENHYDRAMINE HYDROCHLORIDE 50 MG/ML
50 INJECTION INTRAMUSCULAR; INTRAVENOUS ONCE AS NEEDED
OUTPATIENT
Start: 2024-09-27

## 2024-09-20 RX ADMIN — IRON SUCROSE 200 MG: 20 INJECTION, SOLUTION INTRAVENOUS at 08:09

## 2024-09-20 RX ADMIN — SODIUM CHLORIDE: 9 INJECTION, SOLUTION INTRAVENOUS at 08:09

## 2024-09-20 NOTE — NURSING
0841  Pt arrived for #2 venofer.  Denies any pain or complaint.  States she was having heavy menstrual bleeding but it has stopped for now.

## 2024-09-25 ENCOUNTER — LAB VISIT (OUTPATIENT)
Dept: LAB | Facility: HOSPITAL | Age: 39
End: 2024-09-25
Attending: INTERNAL MEDICINE
Payer: MEDICAID

## 2024-09-25 ENCOUNTER — CLINICAL SUPPORT (OUTPATIENT)
Dept: BARIATRICS | Facility: HOSPITAL | Age: 39
End: 2024-09-25

## 2024-09-25 VITALS — WEIGHT: 262.13 LBS | BODY MASS INDEX: 41.05 KG/M2

## 2024-09-25 DIAGNOSIS — E66.01 MORBID OBESITY WITH BMI OF 40.0-44.9, ADULT: Primary | ICD-10-CM

## 2024-09-25 DIAGNOSIS — D35.2 PITUITARY MICROADENOMA WITH HYPERPROLACTINEMIA: ICD-10-CM

## 2024-09-25 DIAGNOSIS — E22.9 PITUITARY MICROADENOMA WITH HYPERPROLACTINEMIA: ICD-10-CM

## 2024-09-25 LAB — CORTIS SERPL-SCNC: <1 UG/DL

## 2024-09-25 PROCEDURE — 82024 ASSAY OF ACTH: CPT

## 2024-09-25 PROCEDURE — 82533 TOTAL CORTISOL: CPT

## 2024-09-25 PROCEDURE — 36415 COLL VENOUS BLD VENIPUNCTURE: CPT

## 2024-09-26 LAB — ACTH PLAS-MCNC: <5 PG/ML

## 2024-09-27 ENCOUNTER — LAB VISIT (OUTPATIENT)
Dept: LAB | Facility: HOSPITAL | Age: 39
End: 2024-09-27
Attending: SURGERY
Payer: MEDICAID

## 2024-09-27 ENCOUNTER — INFUSION (OUTPATIENT)
Dept: INFUSION THERAPY | Facility: HOSPITAL | Age: 39
End: 2024-09-27
Attending: INTERNAL MEDICINE
Payer: MEDICAID

## 2024-09-27 VITALS
RESPIRATION RATE: 20 BRPM | HEART RATE: 79 BPM | OXYGEN SATURATION: 98 % | BODY MASS INDEX: 41.77 KG/M2 | SYSTOLIC BLOOD PRESSURE: 115 MMHG | DIASTOLIC BLOOD PRESSURE: 74 MMHG | HEIGHT: 67 IN | WEIGHT: 266.13 LBS | TEMPERATURE: 98 F

## 2024-09-27 DIAGNOSIS — E66.01 MORBID OBESITY: ICD-10-CM

## 2024-09-27 DIAGNOSIS — K27.9 PEPTIC ULCER DISEASE: Primary | ICD-10-CM

## 2024-09-27 DIAGNOSIS — D50.8 IRON DEFICIENCY ANEMIA SECONDARY TO INADEQUATE DIETARY IRON INTAKE: Primary | ICD-10-CM

## 2024-09-27 DIAGNOSIS — E66.01 MORBID OBESITY DUE TO EXCESS CALORIES: Primary | ICD-10-CM

## 2024-09-27 DIAGNOSIS — K27.9 PEPTIC ULCER DISEASE: ICD-10-CM

## 2024-09-27 PROCEDURE — 96374 THER/PROPH/DIAG INJ IV PUSH: CPT

## 2024-09-27 PROCEDURE — 63600175 PHARM REV CODE 636 W HCPCS: Performed by: NURSE PRACTITIONER

## 2024-09-27 RX ORDER — PANTOPRAZOLE SODIUM 40 MG/1
40 TABLET, DELAYED RELEASE ORAL DAILY
OUTPATIENT
Start: 2024-09-28

## 2024-09-27 RX ORDER — CLONIDINE 0.1 MG/24H
1 PATCH, EXTENDED RELEASE TRANSDERMAL ONCE AS NEEDED
OUTPATIENT
Start: 2024-09-27 | End: 2036-02-24

## 2024-09-27 RX ORDER — GABAPENTIN 100 MG/1
600 CAPSULE ORAL ONCE
OUTPATIENT
Start: 2024-09-27 | End: 2024-09-27

## 2024-09-27 RX ORDER — HYDROXYZINE PAMOATE 25 MG/1
25 CAPSULE ORAL EVERY 8 HOURS PRN
OUTPATIENT
Start: 2024-09-27

## 2024-09-27 RX ORDER — ONDANSETRON 4 MG/1
4 TABLET, ORALLY DISINTEGRATING ORAL ONCE
OUTPATIENT
Start: 2024-09-27 | End: 2024-09-27

## 2024-09-27 RX ORDER — KETOROLAC TROMETHAMINE 30 MG/ML
30 INJECTION, SOLUTION INTRAMUSCULAR; INTRAVENOUS EVERY 6 HOURS
OUTPATIENT
Start: 2024-09-27 | End: 2024-09-30

## 2024-09-27 RX ORDER — DIPHENHYDRAMINE HYDROCHLORIDE 50 MG/ML
50 INJECTION INTRAMUSCULAR; INTRAVENOUS ONCE AS NEEDED
OUTPATIENT
Start: 2024-10-04

## 2024-09-27 RX ORDER — ONDANSETRON HYDROCHLORIDE 2 MG/ML
4 INJECTION, SOLUTION INTRAVENOUS EVERY 4 HOURS PRN
OUTPATIENT
Start: 2024-09-27

## 2024-09-27 RX ORDER — CLONIDINE 0.2 MG/24H
1 PATCH, EXTENDED RELEASE TRANSDERMAL
OUTPATIENT
Start: 2024-09-27

## 2024-09-27 RX ORDER — HYOSCYAMINE SULFATE 0.12 MG/1
0.12 TABLET SUBLINGUAL EVERY 4 HOURS PRN
OUTPATIENT
Start: 2024-09-27

## 2024-09-27 RX ORDER — PROMETHAZINE HYDROCHLORIDE 25 MG/ML
12.5 INJECTION, SOLUTION INTRAMUSCULAR; INTRAVENOUS
OUTPATIENT
Start: 2024-09-27

## 2024-09-27 RX ORDER — SODIUM CHLORIDE 0.9 % (FLUSH) 0.9 %
10 SYRINGE (ML) INJECTION
OUTPATIENT
Start: 2024-10-04

## 2024-09-27 RX ORDER — HYDRALAZINE HYDROCHLORIDE 20 MG/ML
10 INJECTION INTRAMUSCULAR; INTRAVENOUS EVERY 6 HOURS PRN
OUTPATIENT
Start: 2024-09-27

## 2024-09-27 RX ORDER — LABETALOL HYDROCHLORIDE 5 MG/ML
10 INJECTION, SOLUTION INTRAVENOUS
OUTPATIENT
Start: 2024-09-27

## 2024-09-27 RX ORDER — EPINEPHRINE 0.3 MG/.3ML
0.3 INJECTION SUBCUTANEOUS ONCE AS NEEDED
OUTPATIENT
Start: 2024-10-04

## 2024-09-27 RX ORDER — HEPARIN SODIUM 5000 [USP'U]/ML
5000 INJECTION, SOLUTION INTRAVENOUS; SUBCUTANEOUS
OUTPATIENT
Start: 2024-09-27 | End: 2024-09-27

## 2024-09-27 RX ORDER — PROCHLORPERAZINE EDISYLATE 5 MG/ML
5 INJECTION INTRAMUSCULAR; INTRAVENOUS EVERY 6 HOURS PRN
OUTPATIENT
Start: 2024-09-27

## 2024-09-27 RX ORDER — ONDANSETRON 4 MG/1
4 TABLET, ORALLY DISINTEGRATING ORAL EVERY 6 HOURS PRN
OUTPATIENT
Start: 2024-09-28

## 2024-09-27 RX ORDER — ACETAMINOPHEN 500 MG
1000 TABLET ORAL EVERY 8 HOURS
OUTPATIENT
Start: 2024-09-28

## 2024-09-27 RX ORDER — HEPARIN SODIUM 5000 [USP'U]/ML
5000 INJECTION, SOLUTION INTRAVENOUS; SUBCUTANEOUS DAILY
OUTPATIENT
Start: 2024-09-27

## 2024-09-27 RX ORDER — SODIUM CHLORIDE, SODIUM LACTATE, POTASSIUM CHLORIDE, CALCIUM CHLORIDE 600; 310; 30; 20 MG/100ML; MG/100ML; MG/100ML; MG/100ML
INJECTION, SOLUTION INTRAVENOUS CONTINUOUS
OUTPATIENT
Start: 2024-09-27 | End: 2024-09-27

## 2024-09-27 RX ORDER — ACETAMINOPHEN 10 MG/ML
1000 INJECTION, SOLUTION INTRAVENOUS EVERY 8 HOURS
OUTPATIENT
Start: 2024-09-27 | End: 2024-09-28

## 2024-09-27 RX ORDER — SODIUM CHLORIDE, SODIUM LACTATE, POTASSIUM CHLORIDE, CALCIUM CHLORIDE 600; 310; 30; 20 MG/100ML; MG/100ML; MG/100ML; MG/100ML
INJECTION, SOLUTION INTRAVENOUS CONTINUOUS
OUTPATIENT
Start: 2024-09-27

## 2024-09-27 RX ORDER — CELECOXIB 100 MG/1
200 CAPSULE ORAL ONCE
OUTPATIENT
Start: 2024-09-27 | End: 2024-09-27

## 2024-09-27 RX ORDER — TRAMADOL HYDROCHLORIDE 50 MG/1
100 TABLET ORAL EVERY 6 HOURS PRN
OUTPATIENT
Start: 2024-09-27

## 2024-09-27 RX ORDER — SODIUM CHLORIDE 0.9 % (FLUSH) 0.9 %
10 SYRINGE (ML) INJECTION
Status: DISCONTINUED | OUTPATIENT
Start: 2024-09-27 | End: 2024-09-27 | Stop reason: HOSPADM

## 2024-09-27 RX ORDER — ACETAMINOPHEN 500 MG
1000 TABLET ORAL
OUTPATIENT
Start: 2024-09-27 | End: 2024-09-27

## 2024-09-27 RX ADMIN — IRON SUCROSE 200 MG: 20 INJECTION, SOLUTION INTRAVENOUS at 09:09

## 2024-09-28 LAB — UREA BREATH TEST QL: NEGATIVE

## 2024-10-02 ENCOUNTER — ANESTHESIA (OUTPATIENT)
Dept: SURGERY | Facility: HOSPITAL | Age: 39
DRG: 621 | End: 2024-10-02
Payer: MEDICAID

## 2024-10-02 ENCOUNTER — HOSPITAL ENCOUNTER (INPATIENT)
Facility: HOSPITAL | Age: 39
LOS: 1 days | Discharge: HOME | DRG: 621 | End: 2024-10-03
Attending: SURGERY | Admitting: SURGERY
Payer: MEDICAID

## 2024-10-02 DIAGNOSIS — E66.01 MORBID OBESITY DUE TO EXCESS CALORIES: ICD-10-CM

## 2024-10-02 DIAGNOSIS — E66.01 MORBID OBESITY: ICD-10-CM

## 2024-10-02 LAB
B-HCG UR QL: NEGATIVE
CTP QC/QA: YES
GROUP & RH: NORMAL
INDIRECT COOMBS: NORMAL
POCT GLUCOSE: 104 MG/DL (ref 70–110)
POCT GLUCOSE: 93 MG/DL (ref 70–110)
SPECIMEN OUTDATE: NORMAL

## 2024-10-02 PROCEDURE — 36000710: Performed by: SURGERY

## 2024-10-02 PROCEDURE — 63600175 PHARM REV CODE 636 W HCPCS: Performed by: ANESTHESIOLOGY

## 2024-10-02 PROCEDURE — 71000033 HC RECOVERY, INTIAL HOUR: Performed by: SURGERY

## 2024-10-02 PROCEDURE — 86900 BLOOD TYPING SEROLOGIC ABO: CPT | Performed by: NURSE PRACTITIONER

## 2024-10-02 PROCEDURE — 63600175 PHARM REV CODE 636 W HCPCS: Performed by: NURSE ANESTHETIST, CERTIFIED REGISTERED

## 2024-10-02 PROCEDURE — 81025 URINE PREGNANCY TEST: CPT | Performed by: NURSE PRACTITIONER

## 2024-10-02 PROCEDURE — 25000003 PHARM REV CODE 250: Performed by: NURSE ANESTHETIST, CERTIFIED REGISTERED

## 2024-10-02 PROCEDURE — 94761 N-INVAS EAR/PLS OXIMETRY MLT: CPT

## 2024-10-02 PROCEDURE — 25000003 PHARM REV CODE 250: Performed by: NURSE PRACTITIONER

## 2024-10-02 PROCEDURE — 27201423 OPTIME MED/SURG SUP & DEVICES STERILE SUPPLY: Performed by: SURGERY

## 2024-10-02 PROCEDURE — 86850 RBC ANTIBODY SCREEN: CPT | Performed by: NURSE PRACTITIONER

## 2024-10-02 PROCEDURE — 63600175 PHARM REV CODE 636 W HCPCS: Performed by: NURSE PRACTITIONER

## 2024-10-02 PROCEDURE — 0DB64Z3 EXCISION OF STOMACH, PERCUTANEOUS ENDOSCOPIC APPROACH, VERTICAL: ICD-10-PCS | Performed by: SURGERY

## 2024-10-02 PROCEDURE — 37000008 HC ANESTHESIA 1ST 15 MINUTES: Performed by: SURGERY

## 2024-10-02 PROCEDURE — 36000711: Performed by: SURGERY

## 2024-10-02 PROCEDURE — 63600175 PHARM REV CODE 636 W HCPCS: Mod: JZ,JG | Performed by: SURGERY

## 2024-10-02 PROCEDURE — 94799 UNLISTED PULMONARY SVC/PX: CPT

## 2024-10-02 PROCEDURE — 37000009 HC ANESTHESIA EA ADD 15 MINS: Performed by: SURGERY

## 2024-10-02 PROCEDURE — 11000001 HC ACUTE MED/SURG PRIVATE ROOM

## 2024-10-02 PROCEDURE — 43775 LAP SLEEVE GASTRECTOMY: CPT | Mod: ,,, | Performed by: SURGERY

## 2024-10-02 PROCEDURE — 88307 TISSUE EXAM BY PATHOLOGIST: CPT | Mod: TC | Performed by: SURGERY

## 2024-10-02 PROCEDURE — 99900035 HC TECH TIME PER 15 MIN (STAT)

## 2024-10-02 RX ORDER — KETAMINE HCL IN 0.9 % NACL 50 MG/5 ML
SYRINGE (ML) INTRAVENOUS
Status: DISCONTINUED | OUTPATIENT
Start: 2024-10-02 | End: 2024-10-02

## 2024-10-02 RX ORDER — CLONIDINE 0.1 MG/24H
1 PATCH, EXTENDED RELEASE TRANSDERMAL ONCE AS NEEDED
Status: DISCONTINUED | OUTPATIENT
Start: 2024-10-02 | End: 2024-10-03 | Stop reason: HOSPADM

## 2024-10-02 RX ORDER — SODIUM CHLORIDE, SODIUM LACTATE, POTASSIUM CHLORIDE, CALCIUM CHLORIDE 600; 310; 30; 20 MG/100ML; MG/100ML; MG/100ML; MG/100ML
INJECTION, SOLUTION INTRAVENOUS CONTINUOUS
Status: ACTIVE | OUTPATIENT
Start: 2024-10-02 | End: 2024-10-02

## 2024-10-02 RX ORDER — PHENYLEPHRINE HYDROCHLORIDE 10 MG/ML
INJECTION INTRAVENOUS
Status: DISCONTINUED | OUTPATIENT
Start: 2024-10-02 | End: 2024-10-02

## 2024-10-02 RX ORDER — SODIUM CHLORIDE, SODIUM LACTATE, POTASSIUM CHLORIDE, CALCIUM CHLORIDE 600; 310; 30; 20 MG/100ML; MG/100ML; MG/100ML; MG/100ML
INJECTION, SOLUTION INTRAVENOUS CONTINUOUS
Status: DISCONTINUED | OUTPATIENT
Start: 2024-10-02 | End: 2024-10-03 | Stop reason: HOSPADM

## 2024-10-02 RX ORDER — GABAPENTIN 300 MG/1
600 CAPSULE ORAL ONCE
Status: COMPLETED | OUTPATIENT
Start: 2024-10-02 | End: 2024-10-02

## 2024-10-02 RX ORDER — HYDRALAZINE HYDROCHLORIDE 20 MG/ML
10 INJECTION INTRAMUSCULAR; INTRAVENOUS EVERY 6 HOURS PRN
Status: DISCONTINUED | OUTPATIENT
Start: 2024-10-02 | End: 2024-10-03 | Stop reason: HOSPADM

## 2024-10-02 RX ORDER — BUPIVACAINE HYDROCHLORIDE 2.5 MG/ML
INJECTION, SOLUTION EPIDURAL; INFILTRATION; INTRACAUDAL
Status: DISCONTINUED | OUTPATIENT
Start: 2024-10-02 | End: 2024-10-02 | Stop reason: HOSPADM

## 2024-10-02 RX ORDER — PANTOPRAZOLE SODIUM 40 MG/1
40 TABLET, DELAYED RELEASE ORAL DAILY
Status: DISCONTINUED | OUTPATIENT
Start: 2024-10-03 | End: 2024-10-03 | Stop reason: HOSPADM

## 2024-10-02 RX ORDER — ONDANSETRON 4 MG/1
4 TABLET, ORALLY DISINTEGRATING ORAL EVERY 6 HOURS PRN
Qty: 12 TABLET | Refills: 1 | Status: SHIPPED | OUTPATIENT
Start: 2024-10-02

## 2024-10-02 RX ORDER — ACETAMINOPHEN 500 MG
1000 TABLET ORAL EVERY 6 HOURS PRN
Qty: 50 TABLET | Refills: 0 | Status: SHIPPED | OUTPATIENT
Start: 2024-10-02

## 2024-10-02 RX ORDER — PROMETHAZINE HYDROCHLORIDE 25 MG/ML
12.5 INJECTION, SOLUTION INTRAMUSCULAR; INTRAVENOUS
Status: DISCONTINUED | OUTPATIENT
Start: 2024-10-02 | End: 2024-10-03 | Stop reason: HOSPADM

## 2024-10-02 RX ORDER — PROPOFOL 10 MG/ML
VIAL (ML) INTRAVENOUS
Status: DISCONTINUED | OUTPATIENT
Start: 2024-10-02 | End: 2024-10-02

## 2024-10-02 RX ORDER — CLONIDINE 0.2 MG/24H
1 PATCH, EXTENDED RELEASE TRANSDERMAL
Status: DISCONTINUED | OUTPATIENT
Start: 2024-10-02 | End: 2024-10-03 | Stop reason: HOSPADM

## 2024-10-02 RX ORDER — KETOROLAC TROMETHAMINE 30 MG/ML
30 INJECTION, SOLUTION INTRAMUSCULAR; INTRAVENOUS EVERY 6 HOURS
Status: DISCONTINUED | OUTPATIENT
Start: 2024-10-02 | End: 2024-10-03 | Stop reason: HOSPADM

## 2024-10-02 RX ORDER — ACETAZOLAMIDE 250 MG/1
500 TABLET ORAL 2 TIMES DAILY
Status: DISCONTINUED | OUTPATIENT
Start: 2024-10-02 | End: 2024-10-02

## 2024-10-02 RX ORDER — ACETAMINOPHEN 500 MG
1000 TABLET ORAL EVERY 8 HOURS
Status: DISCONTINUED | OUTPATIENT
Start: 2024-10-03 | End: 2024-10-03 | Stop reason: HOSPADM

## 2024-10-02 RX ORDER — ACETAZOLAMIDE 250 MG/1
500 TABLET ORAL 2 TIMES DAILY
Status: DISCONTINUED | OUTPATIENT
Start: 2024-10-03 | End: 2024-10-03 | Stop reason: HOSPADM

## 2024-10-02 RX ORDER — DEXAMETHASONE SODIUM PHOSPHATE 4 MG/ML
INJECTION, SOLUTION INTRA-ARTICULAR; INTRALESIONAL; INTRAMUSCULAR; INTRAVENOUS; SOFT TISSUE
Status: DISCONTINUED | OUTPATIENT
Start: 2024-10-02 | End: 2024-10-02

## 2024-10-02 RX ORDER — KETOROLAC TROMETHAMINE 10 MG/1
10 TABLET, FILM COATED ORAL EVERY 6 HOURS PRN
Qty: 12 TABLET | Refills: 0 | Status: SHIPPED | OUTPATIENT
Start: 2024-10-02 | End: 2024-10-05

## 2024-10-02 RX ORDER — CELECOXIB 200 MG/1
200 CAPSULE ORAL ONCE
Status: COMPLETED | OUTPATIENT
Start: 2024-10-02 | End: 2024-10-02

## 2024-10-02 RX ORDER — ONDANSETRON HYDROCHLORIDE 2 MG/ML
4 INJECTION, SOLUTION INTRAVENOUS EVERY 4 HOURS PRN
Status: DISCONTINUED | OUTPATIENT
Start: 2024-10-02 | End: 2024-10-03 | Stop reason: HOSPADM

## 2024-10-02 RX ORDER — FENTANYL CITRATE 50 UG/ML
INJECTION, SOLUTION INTRAMUSCULAR; INTRAVENOUS
Status: DISCONTINUED | OUTPATIENT
Start: 2024-10-02 | End: 2024-10-02

## 2024-10-02 RX ORDER — HYDROXYZINE PAMOATE 25 MG/1
25 CAPSULE ORAL EVERY 8 HOURS PRN
Status: DISCONTINUED | OUTPATIENT
Start: 2024-10-02 | End: 2024-10-03 | Stop reason: HOSPADM

## 2024-10-02 RX ORDER — ONDANSETRON 4 MG/1
4 TABLET, ORALLY DISINTEGRATING ORAL EVERY 6 HOURS PRN
Status: DISCONTINUED | OUTPATIENT
Start: 2024-10-03 | End: 2024-10-03 | Stop reason: HOSPADM

## 2024-10-02 RX ORDER — ACETAMINOPHEN 500 MG
1000 TABLET ORAL
Status: COMPLETED | OUTPATIENT
Start: 2024-10-02 | End: 2024-10-02

## 2024-10-02 RX ORDER — LABETALOL HCL 20 MG/4 ML
10 SYRINGE (ML) INTRAVENOUS
Status: DISCONTINUED | OUTPATIENT
Start: 2024-10-02 | End: 2024-10-03 | Stop reason: HOSPADM

## 2024-10-02 RX ORDER — PROCHLORPERAZINE EDISYLATE 5 MG/ML
5 INJECTION INTRAMUSCULAR; INTRAVENOUS EVERY 6 HOURS PRN
Status: DISCONTINUED | OUTPATIENT
Start: 2024-10-02 | End: 2024-10-03 | Stop reason: HOSPADM

## 2024-10-02 RX ORDER — MIDAZOLAM HYDROCHLORIDE 2 MG/2ML
2 INJECTION, SOLUTION INTRAMUSCULAR; INTRAVENOUS ONCE AS NEEDED
Status: COMPLETED | OUTPATIENT
Start: 2024-10-02 | End: 2024-10-02

## 2024-10-02 RX ORDER — OXYCODONE HYDROCHLORIDE 5 MG/1
5 TABLET ORAL
Status: DISCONTINUED | OUTPATIENT
Start: 2024-10-02 | End: 2024-10-02 | Stop reason: HOSPADM

## 2024-10-02 RX ORDER — HYOSCYAMINE SULFATE 0.12 MG/1
0.12 TABLET SUBLINGUAL EVERY 4 HOURS PRN
Status: DISCONTINUED | OUTPATIENT
Start: 2024-10-02 | End: 2024-10-03 | Stop reason: HOSPADM

## 2024-10-02 RX ORDER — TRAMADOL HYDROCHLORIDE 50 MG/1
100 TABLET ORAL EVERY 6 HOURS PRN
Status: DISCONTINUED | OUTPATIENT
Start: 2024-10-02 | End: 2024-10-03 | Stop reason: HOSPADM

## 2024-10-02 RX ORDER — HEPARIN SODIUM 5000 [USP'U]/ML
5000 INJECTION, SOLUTION INTRAVENOUS; SUBCUTANEOUS DAILY
Status: DISCONTINUED | OUTPATIENT
Start: 2024-10-02 | End: 2024-10-03 | Stop reason: HOSPADM

## 2024-10-02 RX ORDER — MEPERIDINE HYDROCHLORIDE 25 MG/ML
12.5 INJECTION INTRAMUSCULAR; INTRAVENOUS; SUBCUTANEOUS EVERY 10 MIN PRN
Status: DISCONTINUED | OUTPATIENT
Start: 2024-10-02 | End: 2024-10-02 | Stop reason: HOSPADM

## 2024-10-02 RX ORDER — ROCURONIUM BROMIDE 10 MG/ML
INJECTION, SOLUTION INTRAVENOUS
Status: DISCONTINUED | OUTPATIENT
Start: 2024-10-02 | End: 2024-10-02

## 2024-10-02 RX ORDER — LIDOCAINE HYDROCHLORIDE 20 MG/ML
INJECTION INTRAVENOUS
Status: DISCONTINUED | OUTPATIENT
Start: 2024-10-02 | End: 2024-10-02

## 2024-10-02 RX ORDER — PANTOPRAZOLE SODIUM 40 MG/1
40 TABLET, DELAYED RELEASE ORAL DAILY
Qty: 30 TABLET | Refills: 2 | Status: SHIPPED | OUTPATIENT
Start: 2024-10-02

## 2024-10-02 RX ORDER — HEPARIN SODIUM 5000 [USP'U]/ML
5000 INJECTION, SOLUTION INTRAVENOUS; SUBCUTANEOUS
Status: COMPLETED | OUTPATIENT
Start: 2024-10-02 | End: 2024-10-02

## 2024-10-02 RX ORDER — GLUCAGON 1 MG
1 KIT INJECTION
Status: DISCONTINUED | OUTPATIENT
Start: 2024-10-02 | End: 2024-10-02 | Stop reason: HOSPADM

## 2024-10-02 RX ORDER — CEFAZOLIN SODIUM 1 G/3ML
2 INJECTION, POWDER, FOR SOLUTION INTRAMUSCULAR; INTRAVENOUS
Status: COMPLETED | OUTPATIENT
Start: 2024-10-02 | End: 2024-10-02

## 2024-10-02 RX ORDER — PROMETHAZINE HYDROCHLORIDE 12.5 MG/1
12.5 TABLET ORAL EVERY 6 HOURS PRN
Qty: 12 TABLET | Refills: 1 | Status: SHIPPED | OUTPATIENT
Start: 2024-10-02

## 2024-10-02 RX ORDER — SODIUM CHLORIDE 0.9 % (FLUSH) 0.9 %
10 SYRINGE (ML) INJECTION
Status: DISCONTINUED | OUTPATIENT
Start: 2024-10-02 | End: 2024-10-03 | Stop reason: HOSPADM

## 2024-10-02 RX ORDER — MORPHINE SULFATE 2 MG/ML
2 INJECTION, SOLUTION INTRAMUSCULAR; INTRAVENOUS EVERY 5 MIN PRN
Status: DISCONTINUED | OUTPATIENT
Start: 2024-10-02 | End: 2024-10-02 | Stop reason: HOSPADM

## 2024-10-02 RX ORDER — ACETAMINOPHEN 10 MG/ML
1000 INJECTION, SOLUTION INTRAVENOUS EVERY 8 HOURS
Status: COMPLETED | OUTPATIENT
Start: 2024-10-02 | End: 2024-10-03

## 2024-10-02 RX ORDER — ONDANSETRON HYDROCHLORIDE 2 MG/ML
INJECTION, SOLUTION INTRAVENOUS
Status: DISCONTINUED | OUTPATIENT
Start: 2024-10-02 | End: 2024-10-02

## 2024-10-02 RX ORDER — ONDANSETRON 4 MG/1
4 TABLET, ORALLY DISINTEGRATING ORAL ONCE
Status: COMPLETED | OUTPATIENT
Start: 2024-10-02 | End: 2024-10-02

## 2024-10-02 RX ORDER — ONDANSETRON HYDROCHLORIDE 2 MG/ML
4 INJECTION, SOLUTION INTRAVENOUS DAILY PRN
Status: DISCONTINUED | OUTPATIENT
Start: 2024-10-02 | End: 2024-10-02

## 2024-10-02 RX ADMIN — ONDANSETRON 4 MG: 2 INJECTION INTRAMUSCULAR; INTRAVENOUS at 05:10

## 2024-10-02 RX ADMIN — Medication 20 MG: at 10:10

## 2024-10-02 RX ADMIN — LIDOCAINE HYDROCHLORIDE 100 MG: 20 INJECTION INTRAVENOUS at 09:10

## 2024-10-02 RX ADMIN — ACETAMINOPHEN 1000 MG: 10 INJECTION, SOLUTION INTRAVENOUS at 02:10

## 2024-10-02 RX ADMIN — FENTANYL CITRATE 100 MCG: 50 INJECTION INTRAMUSCULAR; INTRAVENOUS at 09:10

## 2024-10-02 RX ADMIN — ACETAMINOPHEN 1000 MG: 10 INJECTION, SOLUTION INTRAVENOUS at 09:10

## 2024-10-02 RX ADMIN — SODIUM CHLORIDE, POTASSIUM CHLORIDE, SODIUM LACTATE AND CALCIUM CHLORIDE: 600; 310; 30; 20 INJECTION, SOLUTION INTRAVENOUS at 08:10

## 2024-10-02 RX ADMIN — HEPARIN SODIUM 5000 UNITS: 5000 INJECTION, SOLUTION INTRAVENOUS; SUBCUTANEOUS at 06:10

## 2024-10-02 RX ADMIN — CEFAZOLIN 2 G: 330 INJECTION, POWDER, FOR SOLUTION INTRAMUSCULAR; INTRAVENOUS at 09:10

## 2024-10-02 RX ADMIN — PHENYLEPHRINE HYDROCHLORIDE 200 MCG: 10 INJECTION INTRAVENOUS at 10:10

## 2024-10-02 RX ADMIN — MORPHINE SULFATE 2 MG: 2 INJECTION, SOLUTION INTRAMUSCULAR; INTRAVENOUS at 11:10

## 2024-10-02 RX ADMIN — PROCHLORPERAZINE EDISYLATE 5 MG: 5 INJECTION INTRAMUSCULAR; INTRAVENOUS at 11:10

## 2024-10-02 RX ADMIN — KETOROLAC TROMETHAMINE 30 MG: 30 INJECTION, SOLUTION INTRAMUSCULAR; INTRAVENOUS at 05:10

## 2024-10-02 RX ADMIN — PHENYLEPHRINE HYDROCHLORIDE 100 MCG: 10 INJECTION INTRAVENOUS at 09:10

## 2024-10-02 RX ADMIN — KETOROLAC TROMETHAMINE 30 MG: 30 INJECTION, SOLUTION INTRAMUSCULAR; INTRAVENOUS at 11:10

## 2024-10-02 RX ADMIN — SUGAMMADEX 200 MG: 100 INJECTION, SOLUTION INTRAVENOUS at 10:10

## 2024-10-02 RX ADMIN — ONDANSETRON 4 MG: 4 TABLET, ORALLY DISINTEGRATING ORAL at 06:10

## 2024-10-02 RX ADMIN — GABAPENTIN 600 MG: 300 CAPSULE ORAL at 06:10

## 2024-10-02 RX ADMIN — CLONIDINE 1 PATCH: 0.2 PATCH TRANSDERMAL at 11:10

## 2024-10-02 RX ADMIN — CELECOXIB 200 MG: 200 CAPSULE ORAL at 06:10

## 2024-10-02 RX ADMIN — ACETAMINOPHEN 1000 MG: 500 TABLET, FILM COATED ORAL at 06:10

## 2024-10-02 RX ADMIN — METHOCARBAMOL 1000 MG: 100 INJECTION INTRAMUSCULAR; INTRAVENOUS at 11:10

## 2024-10-02 RX ADMIN — SODIUM CHLORIDE, POTASSIUM CHLORIDE, SODIUM LACTATE AND CALCIUM CHLORIDE: 600; 310; 30; 20 INJECTION, SOLUTION INTRAVENOUS at 02:10

## 2024-10-02 RX ADMIN — Medication 30 MG: at 09:10

## 2024-10-02 RX ADMIN — LABETALOL HYDROCHLORIDE 10 MG: 5 INJECTION, SOLUTION INTRAVENOUS at 11:10

## 2024-10-02 RX ADMIN — SODIUM CHLORIDE, POTASSIUM CHLORIDE, SODIUM LACTATE AND CALCIUM CHLORIDE: 600; 310; 30; 20 INJECTION, SOLUTION INTRAVENOUS at 09:10

## 2024-10-02 RX ADMIN — ONDANSETRON 4 MG: 2 INJECTION INTRAMUSCULAR; INTRAVENOUS at 10:10

## 2024-10-02 RX ADMIN — PROMETHAZINE HYDROCHLORIDE 12.5 MG: 25 INJECTION INTRAMUSCULAR; INTRAVENOUS at 01:10

## 2024-10-02 RX ADMIN — DEXAMETHASONE SODIUM PHOSPHATE 8 MG: 4 INJECTION, SOLUTION INTRA-ARTICULAR; INTRALESIONAL; INTRAMUSCULAR; INTRAVENOUS; SOFT TISSUE at 09:10

## 2024-10-02 RX ADMIN — PROPOFOL 200 MG: 10 INJECTION, EMULSION INTRAVENOUS at 09:10

## 2024-10-02 RX ADMIN — HYDRALAZINE HYDROCHLORIDE 10 MG: 20 INJECTION INTRAMUSCULAR; INTRAVENOUS at 11:10

## 2024-10-02 RX ADMIN — ROCURONIUM BROMIDE 50 MG: 10 INJECTION INTRAVENOUS at 09:10

## 2024-10-02 RX ADMIN — SODIUM CHLORIDE, POTASSIUM CHLORIDE, SODIUM LACTATE AND CALCIUM CHLORIDE: 600; 310; 30; 20 INJECTION, SOLUTION INTRAVENOUS at 11:10

## 2024-10-02 RX ADMIN — MIDAZOLAM HYDROCHLORIDE 2 MG: 1 INJECTION, SOLUTION INTRAMUSCULAR; INTRAVENOUS at 08:10

## 2024-10-02 NOTE — TRANSFER OF CARE
Anesthesia Transfer of Care Note    Patient: Ida Saxena    Procedure(s) Performed: Procedure(s) (LRB):  GASTRECTOMY, SLEEVE, LAPAROSCOPIC (N/A)    Patient location: PACU    Anesthesia Type: general    Transport from OR: Transported from OR on room air with adequate spontaneous ventilation    Post pain: adequate analgesia    Post assessment: no apparent anesthetic complications and tolerated procedure well    Post vital signs: stable    Level of consciousness: sedated    Nausea/Vomiting: no nausea/vomiting    Complications: none    Transfer of care protocol was followed

## 2024-10-02 NOTE — ANESTHESIA PROCEDURE NOTES
Intubation    Date/Time: 10/2/2024 9:35 AM    Performed by: Dulce Maria Reyes CRNA  Authorized by: Dulce Maria Reyes CRNA    Intubation:     Induction:  Intravenous    Intubated:  Postinduction    Mask Ventilation:  Easy mask    Attempts:  1    Attempted By:  CRNA    Method of Intubation:  Direct    Blade:  Catalan 2    Laryngeal View Grade: Grade I - full view of cords      Difficult Airway Encountered?: No      Complications:  None    Airway Device:  Oral endotracheal tube    Airway Device Size:  7.5    Style/Cuff Inflation:  Cuffed (inflated to minimal occlusive pressure)    Inflation Amount (mL):  6    Tube secured:  22    Secured at:  The lips    Placement Verified By:  Capnometry    Complicating Factors:  None    Findings Post-Intubation:  BS equal bilateral and atraumatic/condition of teeth unchanged

## 2024-10-02 NOTE — PLAN OF CARE
Problem: Bariatric Environmental Safety  Goal: Safety Maintained with Care  Outcome: Progressing     Problem: Adult Inpatient Plan of Care  Goal: Plan of Care Review  Outcome: Progressing  Goal: Patient-Specific Goal (Individualized)  Outcome: Progressing  Goal: Absence of Hospital-Acquired Illness or Injury  Outcome: Progressing  Goal: Optimal Comfort and Wellbeing  Outcome: Progressing  Goal: Readiness for Transition of Care  Outcome: Progressing     Problem: Wound  Goal: Optimal Coping  Outcome: Progressing  Goal: Optimal Functional Ability  Outcome: Progressing  Goal: Absence of Infection Signs and Symptoms  Outcome: Progressing  Goal: Improved Oral Intake  Outcome: Progressing  Goal: Optimal Pain Control and Function  Outcome: Progressing  Goal: Skin Health and Integrity  Outcome: Progressing  Goal: Optimal Wound Healing  Outcome: Progressing     Problem: Fall Injury Risk  Goal: Absence of Fall and Fall-Related Injury  Outcome: Progressing     Problem: Pain Acute  Goal: Optimal Pain Control and Function  Outcome: Progressing

## 2024-10-02 NOTE — H&P
"    Patient ID: [id number]   Chief Complaint: Pre-op Exam ([facility] [place] 10/02/24)     HPI:      History of Present Illness:  S 9-year-old female has been cleared psychological medically preparation for vertical sleeve gastrectomy          H.Pylori: +, PRINCESS negative  EGD (6/13/24): normal. No hiatal hernia. PRINCESS negative             Pathology: PREPYLORIC GASTRIC MUCOSA, BIOPSY:   MILD CHRONIC GASTRITIS WITH FOCAL INTESTINAL METAPLASIA.  NO DYSPLASIA.  SEE   COMMENT.      Ht: 67in  Weights:   Trend Weights BMI   Starting 281# 43   Pre-Op 276# 43   2 Week       2 Month       6 Month        1 Year       2 Year                  For Adults 20 Years and Older:  BMI Weight Status   Below 18.5 Underweight   18.6-24.9 Normal/Healthy   25.0-29.9 Overweight   30.0 & Above Obese      Ideal Weight Range for Your Height: 5'7" = 121 - 158 lbs                         Pertinent History:  HTN - [x] Yes   [] No    [] Resolved  HLD - [] Yes   [x] No    [] Resolved  DM  -  [] Yes   [x] No    [] Resolved  DESTINY - [] Yes   [x] No   [] Resolved  GERD - [] Yes   [] No [x] Resolved  Anticoagulation- [] Yes   [x] No      If yes, type: [] ASA [] Plavix [] Other     Patient Care Team:  Yue Mccabe FNP as PCP - General (Family Medicine)  Gorge Kolb MD as Surgeon (Bariatrics)  Liane Salas MD as Consulting Physician (Neurology)  David Concepcion MD as Consulting Physician (Endocrinology)  Brandon Ariza MD as Consulting Physician (Neurosurgery)      Subjective:      See HPI for details     Constitutional: Denies Change in appetite. Denies Chills. Denies Fever. Denies Night sweats.  Respiratory: Denies Cough. Denies Shortness of breath. Denies Shortness of breath with exertion. Denies Wheezing.  Cardiovascular: Denies Chest pain at rest. Denies Chest pain with exertion. Denies Irregular heartbeat. Denies Palpitations. Denies Edema.  Gastrointestinal: Denies Abdominal pain. DeniesDiarrhea. Denies Nausea. Denies Vomiting. Denies Hematemesis " or Hematochezia.  Genitourinary: Denies Dysuria. Denies Urinary frequency. Denies Urinary urgency. Denies Blood in urine.  Endocrine: Denies Cold intolerance. Denies Excessive thirst. Denies Heat intolerance. Denies Weight loss.   Musculoskeletal: Denies Painful joints. Denies Weakness.  Integumentary: Denies Rash. Denies Itching. Denies Dry skin.  Neurologic: Denies Dizziness. Denies Fainting. Denies Headache.  Psychiatric: Denies Depression. Denies Anxiety. Denies Suicidal/Homicidal ideations.     12 point review of systems conducted, negative except as stated in the history of present illness. See HPI for details.     Review of patient's allergies indicates:  No Known Allergies       Past Medical History:   Diagnosis Date    Abnormal uterine bleeding      Anemia 2022    Encounter for Papanicolaou smear of cervix 2023    Hypertension      Idiopathic intracranial hypertension      Migraines      Missed period 2023    Papilledema      Pituitary microadenoma              Past Surgical History:   Procedure Laterality Date     SECTION         x 3    DILATION AND CURETTAGE OF UTERUS N/A 2023     Procedure: DILATION AND CURETTAGE, UTERUS fx;  Surgeon: Jose Canales Jr., MD;  Location: [facility] OR;  Service: OB/GYN;  Laterality: N/A;    ESOPHAGOGASTRODUODENOSCOPY N/A 2024     Procedure: EGD (ESOPHAGOGASTRODUODENOSCOPY);  Surgeon: Gorge Kolb MD;  Location: [facility] OR;  Service: General;  Laterality: N/A;             Family History   Problem Relation Name Age of Onset    Hypothyroidism Mother        Heart murmur Mother        Heart failure Father        Cancer Father        No Known Problems Daughter        No Known Problems Son        No Known Problems Son        Colon cancer Maternal Grandmother        Breast cancer Other             Mother's sister      Social History            Tobacco Use    Smoking status: Former       Current packs/day: 0.00       Average packs/day: 0.5 packs/day  "for 15.0 years (7.5 ttl pk-yrs)       Types: Cigarettes, Vaping with nicotine       Start date: 1/1/2006       Quit date: 1/1/2021       Years since quitting: 3.7    Smokeless tobacco: Never    Tobacco comments:       Patient states "I stop smoking since 2021"   Substance Use Topics    Alcohol use: Yes       Alcohol/week: 2.0 standard drinks of alcohol       Types: 2 Glasses of wine per week       Comment: occassionally    Drug use: Never           Current Outpatient Medications   Medication Instructions    acetaminophen (TYLENOL) 325 mg Cap Oral    acetaZOLAMIDE (DIAMOX) 500 mg, Oral, 2 times daily    ferrous sulfate 325 mg, Oral, 3 times daily with meals         Objective:      Vital Signs (Most Recent):  Visit Vitals  /79   Pulse 78   Ht 5' 7" (1.702 m)   Wt 125.3 kg (276 lb 3.2 oz)   BMI 43.26 kg/m²         Physical Exam:  General:  Alert and oriented.    Respiratory:  Lungs are clear to auscultation, Respirations are non-labored, Breath sounds are equal.    Cardiovascular:  Normal rate, Regular rhythm, No murmur.    Gastrointestinal:  Soft, Non-tender, Non-distended, Normal bowel sounds        Musculoskeletal:  Normal range of motion, Normal strength.    Integumentary:  Warm, Dry, Pink.    Neurologic:  Alert, Oriented.    Psychiatric:  Cooperative.       Assessment:          ICD-10-CM ICD-9-CM   1. Pre-operative examination  Z01.818 V72.84   2. Hypertension, unspecified type  I10 401.9   3. IIH (idiopathic intracranial hypertension)  G93.2 348.2   4. Encounter for weight loss counseling  Z71.3 V65.3   5. Dietary counseling  Z71.3 V65.3   6. Exercise counseling  Z71.82 V65.41   7. Morbid obesity  E66.01 278.01   8. Encounter for pre-bariatric surgery counseling and education  Z71.89 V65.49      Plan:      1. Pre-operative examination  APTT     Comprehensive Metabolic Panel     CBC Auto Differential       2. Hypertension, unspecified type          3. IIH (idiopathic intracranial hypertension)          4. " Encounter for weight loss counseling          5. Dietary counseling          6. Exercise counseling          7. Morbid obesity          8. Encounter for pre-bariatric surgery counseling and education                Plan:      Ida Saxena has voluntarily decided to undergo laparoscopic sleeve gastrectomy under the care of Gorge Kolb MD. The patient has stated that the performance of this procedure requires major intra-abdominal surgery and therefore carries certain risks. Like all major surgery, there is a chance of death during or immediately following the procedure. The risk varies depending on the age, weight and the medical background of each individual patient.     Additionally, there can be complications as a result of the procedure. General anesthesia is a required and a respirator is necessary during the operation. The risk is greater in patients with a history of smoking, that weigh more than 400 pounds, have a BMI >55 or can not walk up a flight of stairs.     Performance of the surgery requires a long division of the stomach with a special stapler. If leakage occurs, additional surgery or drainage procedures may need to be performed. In addition, since the stomach lies in close proximity to the spleen, the possibility of splenic injury requiring splenectomy may occur.     In addition, complications may occur in wound healing. These may include wound infections, fascial disruptions and hernias which may require future surgical repair. Wound drainage is common in obese patients. In addition to these outlined complications, there can be additional problems which can occur in all major operations. These include infection, unexpected myocardial infarctions and the formation of blood clots leading to pulmonary embolism.     The patient voiced there understanding of the above and that the procedure may be converted to an open procedure if the surgeon feels the surgery can not safely continue  laparoscopically.     The patient also voiced there understanding that patients who undergo this surgery generally lose a significant amount of weight and keep it off, but no operation can guarantee permanent weight reduction.     All questions were answered in regards to surgery. I once again reviewed all the risks / benefits of surgery with the patient in regards to the laparoscopic sleeve gastrectomy , the patient voiced their understanding and wishes to continue.      - preop labs   - Continue preop diet  - All questions answered and pt to sign consent with Gorge Kolb MD AM of surgery     OR today for lap Vicryl sleeve gastrectomy    Personally seen examined

## 2024-10-02 NOTE — DISCHARGE INSTRUCTIONS
HOSPITAL DISCHARGE INSTRUCTIONS    Clinic Phone Numbers       Surgeons office number and after hours on call surgeon: [phone].    ALWAYS call the surgeons office PRIOR to going to an Urgent Care or the emergency room. If medical emergency, call 911.     Signs and Symptoms that would warrant a phone call of the office (regardless of the time of day):     Fever greater than 101 F     Uncontrolled pain that does not improve with pain medication     Uncontrolled nausea that does not improve with nausea medication      Vomiting     Shortness of breath     Chest Pain     Foul smelling drainage from incision and/or yellow or green drainage from incision     Red, hot painful incisions     Bloody bowel movements     ** If you feel as though it is a life-threatening emergency, call 911 and go to the emergency room**          Prescriptions     Medications     Pain medication (if needed) Tylenol over the counter is safe to take for discomfort     Anti-nausea medication (if needed)     Proton Pump Inhibitor (finish all 30 days), call [phone] if you did not receive 30 days of medication       Supplements     Chewable multivitamin- take 2 times a day (unless otherwise directed)     Chewable Calcium Citrate with D3- take 3 times a day (unless otherwise directed)     Iron tablet- take once daily      MiraLAX- take once daily for 2 weeks       Home Medications     Please review your medication list that you received at pre-op class as well as at the hospital instructions upon discharge to assure you are resuming all medications that are deemed  safe after surgery.      ** REMINDER- You should have scheduled your follow-up with your prescribing doctor for 1-week post-op**          Appointments     Surgeons Post-op Visits- please review orange sheet you received in the mail after surgery. Call [phone] if you need to reschedule your surgeons post-op visit.      Bariatric Team Post-op Visits- please review blue sheet  you received in your e-mail or please reference [facility] for your post-op appointments. . Call [phone] option 1 if you need to reschedule your bariatric team post-op visit.          Nutritional Considerations     Hydration is CRITICAL!     Daily fluid intake of  oz water     Water is more important than protein      Review list of allowable and non-allowable liquids in your Bariatric Booklet    The only fluids that count towards your water goal is water, sugar free, caffeine free flavored water        Diet progression          Continue the dietary protocol until you meet with the dietitian at your 2-week visit     Strive to reach protein goal. Only liquids counted toward your protein goal are protein shake, milk and approved yogurt     It is MANDATORY that you do not progress your diet without speaking to the dietitian to prevent potential complications          Incisional Care     Wash your hands before you touch your incisions or dressings     Remove any gauze or dressing over your incision. ONLY steri-strips (butter-fly band aids) should remain over your incision     Shower daily with an anti-bacterial soap (Hibiclens or Dial, orange bar).      Allow water to hit your back in the shower     Wet the incisions with water     Apply soap to a clean washcloth and wash over your incisions (do not scrub)     Rinse your incision with water and pat dry with a clean towel      Check your incisions daily for any redness, swelling, hot to touch, or bright red, green or yellow drainage.             Dark red, dried blood, indention of an incision, bruising may appear under the steri-strips. This is normal.     Do not apply any creams, ointments, etc. on the incisions.      Leave incision open to air (unless instructed otherwise)          Activity     Walk and/or ride a stationary bike 20 minutes a day     Do not lift, pull or push anything greater than 10 pounds for 4-6 weeks     Do not go the gym until you are  4-6 weeks post-op     Use your incentive spirometer (breathing machine) 10 x an hour for 1-2 weeks     Shower daily, DO NOT submerge yourself in water until 2 weeks post-op and cleared by surgeon          Post-Operative Expectations     A soreness is to be expected. Pain differs for everyone. Please refer to the pain scale and list of when to call the doctor regarding pain.     Nausea can last a few weeks after surgery due to the body getting adjusted to the new small stomach. Take anti-nausea as needed and stay HYDRATED. Slight dehydration will cause nausea.     Constipation is common after surgery. Take MiraLAX daily even if your bowel movements are regular. Please refer to the FAQs regarding constipation. Water is essential in preventing constipation.

## 2024-10-02 NOTE — INTERVAL H&P NOTE
The patient has been examined and the H&P has been reviewed: I concur with the findings and no changes have occurred since H&P was written.  Patient has been NPO since midnight. No recent illness, chest pain, SOB, n/v. Recently prescribed cabergoline for known history of pituitary microadenoma, last dose was last night. Not on anticoagulation.     Surgery risks, benefits and alternative options discussed and understood by patient/family.    Pre-op diagnosis: obesity  Plan to proceed to OR today as scheduled for laparoscopic sleeve gastrectomy.     Laura Espitia MD  [facility] General Surgery PGY-1  10/02/2024

## 2024-10-02 NOTE — ANESTHESIA POSTPROCEDURE EVALUATION
Anesthesia Post Evaluation    Patient: Ida Saxena    Procedure(s) Performed: Procedure(s) (LRB):  GASTRECTOMY, SLEEVE, LAPAROSCOPIC (N/A)    Final Anesthesia Type: general      Patient location during evaluation: PACU  Post-procedure vital signs: reviewed and stable  Airway patency: patent    PONV status at discharge: nausea (controlled)  Anesthetic complications: no      Cardiovascular status: hemodynamically stable  Respiratory status: spontaneous ventilation  Follow-up not needed.              Vitals Value Taken Time   /99 10/02/24 1123   Temp 36.3 °C (97.3 °F) 10/02/24 1103   Pulse 75 10/02/24 1123   Resp 16 10/02/24 1123   SpO2 99 % 10/02/24 1123         No case tracking events are documented in the log.      Pain/Naina Score: Pain Rating Prior to Med Admin: 8 (10/2/2024 11:28 AM)  Naina Score: 7 (10/2/2024 11:17 AM)

## 2024-10-02 NOTE — OP NOTE
Date of operation 10-2-2024     Operation: Laparoscopic vertical sleeve gastrectomy      Surgeon:Gorge Kolb MD     Co-surgeon:  Luis Badillo MD     Preop diagnosis:  Morbid obesity     Postop diagnosis:  Same     Indications:  39year-old with morbid obesity, she has been cleared medically and psychologically for bariatric surgery  ]  Anesthesia: General endotracheal      Specimens:  Portion of stomach      Blood loss:  15 cc      Findings: No significant hiatal hernia, otherwise normal gastric anatomy      Complications:  None      Disposition:  Stable satisfactory to PACU      Details of operation:  Patient was brought to the operating room laid supine on the operating table, general anesthesia was administered he was intubated endotracheally      The abdomen was prepped and draped in usual in the usual sterile fashion, a 11 mm Optiview trocar was placed supraumbilically and left of midline, pneumoperitoneum was achieved and was no injury to intra-abdominal structures during placement of this port  15 mm port was placed in the right upper quadrant and 5 mm ports were placed in the right upper quadrant and 2 in the left upper quadrant      A 5 mm stab incision was made in the epigastrium through which a liver retractor was placed, this was used to elevate the liver and anterior direction exposing the stomach and hiatus      The there appeared to be no significant hiatal hernia, the harmonic device was used to remove the vasculature off of the greater curvature of the stomach fstarting 6 cm proximal to the pylorus working all the way up to the angle of his      A 34 Swazi blunt-tip bougie was placed, with the tip was positioned at the pylorus     Sequential firings of green then blue Endo-LYNDA staple cartridges were employed to complete the sleeve gastrectomy alongside the bougie      The excluded portion of the stomach was retrieved and sent to pathology as specimen      A trans abdominis preperitoneal  block was performed with 30 cc of 0.25% Marcaine for postoperative analgesia      The 15 mm fascial incision site was closed with a laparoscopic suture Passer and a 0 Vicryl suture      Vista seal glue was applied to the cut edge staple the stomach for to aid with hemostasis      All ports removed under direct laparoscopic vision, all skin incisions were closed with 4-0 subcuticular Vicryl sutures and sterile dressings     Patient tolerated procedure well was extubated and brought to the PACU in stable satisfactory condition thank you

## 2024-10-02 NOTE — INTERVAL H&P NOTE
The patient has been examined and the H&P has been reviewed:    I concur with the findings and changes have been noted since the H&P was written:  Agree    Surgery risks, benefits and alternative options discussed and understood by patient/family.          There are no hospital problems to display for this patient.

## 2024-10-02 NOTE — OP NOTE
"       Patient ID: [id number]   Chief Complaint: Pre-op Exam ([facility] [place] 10/02/24)     HPI:      History of Present Illness:  S 9-year-old female has been cleared psychological medically preparation for vertical sleeve gastrectomy          H.Pylori: +, PRINCESS negative  EGD (6/13/24): normal. No hiatal hernia. PRINCESS negative             Pathology: PREPYLORIC GASTRIC MUCOSA, BIOPSY:   MILD CHRONIC GASTRITIS WITH FOCAL INTESTINAL METAPLASIA.  NO DYSPLASIA.  SEE   COMMENT.      Ht: 67in  Weights:   Trend Weights BMI   Starting 281# 43   Pre-Op 276# 43   2 Week       2 Month       6 Month        1 Year       2 Year                  For Adults 20 Years and Older:  BMI Weight Status   Below 18.5 Underweight   18.6-24.9 Normal/Healthy   25.0-29.9 Overweight   30.0 & Above Obese      Ideal Weight Range for Your Height: 5'7" = 121 - 158 lbs                         Pertinent History:  HTN - [x] Yes   [] No    [] Resolved  HLD - [] Yes   [x] No    [] Resolved  DM  -  [] Yes   [x] No    [] Resolved  DESTINY - [] Yes   [x] No   [] Resolved  GERD - [] Yes   [] No [x] Resolved  Anticoagulation- [] Yes   [x] No      If yes, type: [] ASA [] Plavix [] Other     Patient Care Team:  Yue Mccabe FNP as PCP - General (Family Medicine)  Gorge Kolb MD as Surgeon (Bariatrics)  Liane Salas MD as Consulting Physician (Neurology)  David Concepcion MD as Consulting Physician (Endocrinology)  Brandon Ariza MD as Consulting Physician (Neurosurgery)      Subjective:      See HPI for details     Constitutional: Denies Change in appetite. Denies Chills. Denies Fever. Denies Night sweats.  Respiratory: Denies Cough. Denies Shortness of breath. Denies Shortness of breath with exertion. Denies Wheezing.  Cardiovascular: Denies Chest pain at rest. Denies Chest pain with exertion. Denies Irregular heartbeat. Denies Palpitations. Denies Edema.  Gastrointestinal: Denies Abdominal pain. DeniesDiarrhea. Denies Nausea. Denies Vomiting. Denies " Hematemesis or Hematochezia.  Genitourinary: Denies Dysuria. Denies Urinary frequency. Denies Urinary urgency. Denies Blood in urine.  Endocrine: Denies Cold intolerance. Denies Excessive thirst. Denies Heat intolerance. Denies Weight loss.   Musculoskeletal: Denies Painful joints. Denies Weakness.  Integumentary: Denies Rash. Denies Itching. Denies Dry skin.  Neurologic: Denies Dizziness. Denies Fainting. Denies Headache.  Psychiatric: Denies Depression. Denies Anxiety. Denies Suicidal/Homicidal ideations.     12 point review of systems conducted, negative except as stated in the history of present illness. See HPI for details.     Review of patient's allergies indicates:  No Known Allergies       Past Medical History:   Diagnosis Date    Abnormal uterine bleeding      Anemia 2022    Encounter for Papanicolaou smear of cervix 2023    Hypertension      Idiopathic intracranial hypertension      Migraines      Missed period 2023    Papilledema      Pituitary microadenoma              Past Surgical History:   Procedure Laterality Date     SECTION         x 3    DILATION AND CURETTAGE OF UTERUS N/A 2023     Procedure: DILATION AND CURETTAGE, UTERUS fx;  Surgeon: Jose Canales Jr., MD;  Location: [facility] OR;  Service: OB/GYN;  Laterality: N/A;    ESOPHAGOGASTRODUODENOSCOPY N/A 2024     Procedure: EGD (ESOPHAGOGASTRODUODENOSCOPY);  Surgeon: Gorge Kolb MD;  Location: [facility] OR;  Service: General;  Laterality: N/A;             Family History   Problem Relation Name Age of Onset    Hypothyroidism Mother        Heart murmur Mother        Heart failure Father        Cancer Father        No Known Problems Daughter        No Known Problems Son        No Known Problems Son        Colon cancer Maternal Grandmother        Breast cancer Other             Mother's sister      Social History            Tobacco Use    Smoking status: Former       Current packs/day: 0.00       Average packs/day: 0.5  "packs/day for 15.0 years (7.5 ttl pk-yrs)       Types: Cigarettes, Vaping with nicotine       Start date: 1/1/2006       Quit date: 1/1/2021       Years since quitting: 3.7    Smokeless tobacco: Never    Tobacco comments:       Patient states "I stop smoking since 2021"   Substance Use Topics    Alcohol use: Yes       Alcohol/week: 2.0 standard drinks of alcohol       Types: 2 Glasses of wine per week       Comment: occassionally    Drug use: Never           Current Outpatient Medications   Medication Instructions    acetaminophen (TYLENOL) 325 mg Cap Oral    acetaZOLAMIDE (DIAMOX) 500 mg, Oral, 2 times daily    ferrous sulfate 325 mg, Oral, 3 times daily with meals         Objective:      Vital Signs (Most Recent):  Visit Vitals  /79   Pulse 78   Ht 5' 7" (1.702 m)   Wt 125.3 kg (276 lb 3.2 oz)   BMI 43.26 kg/m²         Physical Exam:  General:  Alert and oriented.    Respiratory:  Lungs are clear to auscultation, Respirations are non-labored, Breath sounds are equal.    Cardiovascular:  Normal rate, Regular rhythm, No murmur.    Gastrointestinal:  Soft, Non-tender, Non-distended, Normal bowel sounds        Musculoskeletal:  Normal range of motion, Normal strength.    Integumentary:  Warm, Dry, Pink.    Neurologic:  Alert, Oriented.    Psychiatric:  Cooperative.       Assessment:          ICD-10-CM ICD-9-CM   1. Pre-operative examination  Z01.818 V72.84   2. Hypertension, unspecified type  I10 401.9   3. IIH (idiopathic intracranial hypertension)  G93.2 348.2   4. Encounter for weight loss counseling  Z71.3 V65.3   5. Dietary counseling  Z71.3 V65.3   6. Exercise counseling  Z71.82 V65.41   7. Morbid obesity  E66.01 278.01   8. Encounter for pre-bariatric surgery counseling and education  Z71.89 V65.49      Plan:      1. Pre-operative examination  APTT     Comprehensive Metabolic Panel     CBC Auto Differential       2. Hypertension, unspecified type          3. IIH (idiopathic intracranial hypertension)    "       4. Encounter for weight loss counseling          5. Dietary counseling          6. Exercise counseling          7. Morbid obesity          8. Encounter for pre-bariatric surgery counseling and education                Plan:      Ida Saxena has voluntarily decided to undergo laparoscopic sleeve gastrectomy under the care of Gorge Kolb MD. The patient has stated that the performance of this procedure requires major intra-abdominal surgery and therefore carries certain risks. Like all major surgery, there is a chance of death during or immediately following the procedure. The risk varies depending on the age, weight and the medical background of each individual patient.     Additionally, there can be complications as a result of the procedure. General anesthesia is a required and a respirator is necessary during the operation. The risk is greater in patients with a history of smoking, that weigh more than 400 pounds, have a BMI >55 or can not walk up a flight of stairs.     Performance of the surgery requires a long division of the stomach with a special stapler. If leakage occurs, additional surgery or drainage procedures may need to be performed. In addition, since the stomach lies in close proximity to the spleen, the possibility of splenic injury requiring splenectomy may occur.     In addition, complications may occur in wound healing. These may include wound infections, fascial disruptions and hernias which may require future surgical repair. Wound drainage is common in obese patients. In addition to these outlined complications, there can be additional problems which can occur in all major operations. These include infection, unexpected myocardial infarctions and the formation of blood clots leading to pulmonary embolism.     The patient voiced there understanding of the above and that the procedure may be converted to an open procedure if the surgeon feels the surgery can not safely continue  laparoscopically.     The patient also voiced there understanding that patients who undergo this surgery generally lose a significant amount of weight and keep it off, but no operation can guarantee permanent weight reduction.     All questions were answered in regards to surgery. I once again reviewed all the risks / benefits of surgery with the patient in regards to the laparoscopic sleeve gastrectomy , the patient voiced their understanding and wishes to continue.      - preop labs   - Continue preop diet  - All questions answered and pt to sign consent with Gorge Kolb MD AM of surgery     OR today for lap Vicryl sleeve gastrectomy    Personally seen examined

## 2024-10-03 VITALS
TEMPERATURE: 98 F | BODY MASS INDEX: 40.1 KG/M2 | DIASTOLIC BLOOD PRESSURE: 81 MMHG | WEIGHT: 255.5 LBS | RESPIRATION RATE: 17 BRPM | HEIGHT: 67 IN | HEART RATE: 72 BPM | SYSTOLIC BLOOD PRESSURE: 118 MMHG | OXYGEN SATURATION: 100 %

## 2024-10-03 LAB
ANION GAP SERPL CALC-SCNC: 8 MEQ/L
BASOPHILS # BLD AUTO: 0.01 X10(3)/MCL
BASOPHILS NFR BLD AUTO: 0.2 %
BUN SERPL-MCNC: 3.8 MG/DL (ref 7–18.7)
CALCIUM SERPL-MCNC: 9.2 MG/DL (ref 8.4–10.2)
CHLORIDE SERPL-SCNC: 112 MMOL/L (ref 98–107)
CO2 SERPL-SCNC: 21 MMOL/L (ref 22–29)
CREAT SERPL-MCNC: 0.67 MG/DL (ref 0.55–1.02)
CREAT/UREA NIT SERPL: 6
EOSINOPHIL # BLD AUTO: 0.01 X10(3)/MCL (ref 0–0.9)
EOSINOPHIL NFR BLD AUTO: 0.2 %
ERYTHROCYTE [DISTWIDTH] IN BLOOD BY AUTOMATED COUNT: 14.2 % (ref 11.5–17)
ESTROGEN SERPL-MCNC: NORMAL PG/ML
GFR SERPLBLD CREATININE-BSD FMLA CKD-EPI: >60 ML/MIN/1.73/M2
GLUCOSE SERPL-MCNC: 88 MG/DL (ref 74–100)
HCT VFR BLD AUTO: 33.1 % (ref 37–47)
HGB BLD-MCNC: 10.6 G/DL (ref 12–16)
HOLD SPECIMEN: NORMAL
IMM GRANULOCYTES # BLD AUTO: 0.02 X10(3)/MCL (ref 0–0.04)
IMM GRANULOCYTES NFR BLD AUTO: 0.3 %
INSULIN SERPL-ACNC: NORMAL U[IU]/ML
LAB AP CLINICAL INFORMATION: NORMAL
LAB AP GROSS DESCRIPTION: NORMAL
LAB AP REPORT FOOTNOTES: NORMAL
LYMPHOCYTES # BLD AUTO: 1.22 X10(3)/MCL (ref 0.6–4.6)
LYMPHOCYTES NFR BLD AUTO: 18.8 %
MCH RBC QN AUTO: 28 PG (ref 27–31)
MCHC RBC AUTO-ENTMCNC: 32 G/DL (ref 33–36)
MCV RBC AUTO: 87.3 FL (ref 80–94)
MONOCYTES # BLD AUTO: 0.43 X10(3)/MCL (ref 0.1–1.3)
MONOCYTES NFR BLD AUTO: 6.6 %
NEUTROPHILS # BLD AUTO: 4.79 X10(3)/MCL (ref 2.1–9.2)
NEUTROPHILS NFR BLD AUTO: 73.9 %
NRBC BLD AUTO-RTO: 0 %
PLATELET # BLD AUTO: 211 X10(3)/MCL (ref 130–400)
PMV BLD AUTO: 9.9 FL (ref 7.4–10.4)
POTASSIUM SERPL-SCNC: 3.4 MMOL/L (ref 3.5–5.1)
RBC # BLD AUTO: 3.79 X10(6)/MCL (ref 4.2–5.4)
SODIUM SERPL-SCNC: 141 MMOL/L (ref 136–145)
T3RU NFR SERPL: NORMAL %
WBC # BLD AUTO: 6.48 X10(3)/MCL (ref 4.5–11.5)

## 2024-10-03 PROCEDURE — 63600175 PHARM REV CODE 636 W HCPCS: Performed by: STUDENT IN AN ORGANIZED HEALTH CARE EDUCATION/TRAINING PROGRAM

## 2024-10-03 PROCEDURE — 85025 COMPLETE CBC W/AUTO DIFF WBC: CPT | Performed by: NURSE PRACTITIONER

## 2024-10-03 PROCEDURE — 80048 BASIC METABOLIC PNL TOTAL CA: CPT | Performed by: NURSE PRACTITIONER

## 2024-10-03 PROCEDURE — 63600175 PHARM REV CODE 636 W HCPCS: Performed by: NURSE PRACTITIONER

## 2024-10-03 PROCEDURE — 99024 POSTOP FOLLOW-UP VISIT: CPT | Mod: ,,, | Performed by: SURGERY

## 2024-10-03 PROCEDURE — 99238 HOSP IP/OBS DSCHRG MGMT 30/<: CPT | Mod: ,,, | Performed by: NURSE PRACTITIONER

## 2024-10-03 PROCEDURE — 36415 COLL VENOUS BLD VENIPUNCTURE: CPT | Performed by: NURSE PRACTITIONER

## 2024-10-03 PROCEDURE — 94799 UNLISTED PULMONARY SVC/PX: CPT

## 2024-10-03 PROCEDURE — 25000003 PHARM REV CODE 250: Performed by: NURSE PRACTITIONER

## 2024-10-03 PROCEDURE — 99900035 HC TECH TIME PER 15 MIN (STAT)

## 2024-10-03 RX ORDER — POTASSIUM CHLORIDE 7.45 MG/ML
10 INJECTION INTRAVENOUS
Status: DISPENSED | OUTPATIENT
Start: 2024-10-03 | End: 2024-10-03

## 2024-10-03 RX ORDER — HYOSCYAMINE SULFATE 0.125 MG
125 TABLET ORAL EVERY 4 HOURS PRN
Qty: 30 TABLET | Refills: 1 | Status: SHIPPED | OUTPATIENT
Start: 2024-10-03

## 2024-10-03 RX ADMIN — ACETAMINOPHEN 1000 MG: 10 INJECTION, SOLUTION INTRAVENOUS at 06:10

## 2024-10-03 RX ADMIN — POTASSIUM CHLORIDE 10 MEQ: 7.46 INJECTION, SOLUTION INTRAVENOUS at 07:10

## 2024-10-03 RX ADMIN — HEPARIN SODIUM 5000 UNITS: 5000 INJECTION, SOLUTION INTRAVENOUS; SUBCUTANEOUS at 09:10

## 2024-10-03 RX ADMIN — PANTOPRAZOLE SODIUM 40 MG: 40 TABLET, DELAYED RELEASE ORAL at 09:10

## 2024-10-03 RX ADMIN — ACETAZOLAMIDE 500 MG: 250 TABLET ORAL at 09:10

## 2024-10-03 RX ADMIN — KETOROLAC TROMETHAMINE 30 MG: 30 INJECTION, SOLUTION INTRAMUSCULAR; INTRAVENOUS at 12:10

## 2024-10-03 RX ADMIN — SODIUM CHLORIDE, POTASSIUM CHLORIDE, SODIUM LACTATE AND CALCIUM CHLORIDE: 600; 310; 30; 20 INJECTION, SOLUTION INTRAVENOUS at 04:10

## 2024-10-03 RX ADMIN — KETOROLAC TROMETHAMINE 30 MG: 30 INJECTION, SOLUTION INTRAMUSCULAR; INTRAVENOUS at 06:10

## 2024-10-03 NOTE — PROGRESS NOTES
Date of education: 10/3/24  Pt education type: []Pre op  [x]Post op  Surgery date: 10/2/24  Type of surgery: sleeve gastrectomy     Education was provided on:   [x]Importance of protein and vitamin protocol  [x]Importance of drinking  fl oz/day of non carbonated sugar free non caffeinated beverages  [x]Importance of following dietary protocol  [x]Importance of ambulation postop   [x]Use of incentive spirometer 10 times an hour while awake  [x]Non opiod pain management post op   [x]Discontinuing use of meds containing aspirin, ibuprofen, NSAIDs, post op  [x]Signs and symptoms of immediate and long term complications post-op  [x]Prevention and signs and symptoms of blood clots   [x]Prevention and signs of infection  [x]Reviewed medication regimen  [x]Importance of adhering to behavioral changes  [x]Importance of following exercise protocol      Barriers to learning:  [x]None evident  []Acuity of illness  []Cognitive defects  []Cultural barriers  []Desire/Motivation  []Difficulty concentrating  []Emotional state  []Financial concerns  []Hearing deficit  []Language barrier  []Literacy  []Memory problems  []Vision impairment     Teaching methods:  []Demonstration  [x]Explanation  []Printed materials  [x]Teach back  []Virtual/web based    Expected Compliance:  [x]Good  []Fair  []Poor    Additional Notes:  Reviewed above protocols with patient. She needed assistance with recalling the post-op protocols. Re-educated patient on above post-op protocols and reiterated the importance of following the dietary and behavioral guidelines to prevent complications.

## 2024-10-03 NOTE — PLAN OF CARE
Problem: Adult Inpatient Plan of Care  Goal: Plan of Care Review  Outcome: Met  Goal: Patient-Specific Goal (Individualized)  Outcome: Met  Goal: Absence of Hospital-Acquired Illness or Injury  Outcome: Met  Goal: Optimal Comfort and Wellbeing  Outcome: Met  Goal: Readiness for Transition of Care  Outcome: Met     Problem: Bariatric Environmental Safety  Goal: Safety Maintained with Care  Outcome: Met     Problem: Wound  Goal: Optimal Coping  Outcome: Met  Goal: Optimal Functional Ability  Outcome: Met  Goal: Absence of Infection Signs and Symptoms  Outcome: Met  Goal: Improved Oral Intake  Outcome: Met  Goal: Optimal Pain Control and Function  Outcome: Met  Goal: Skin Health and Integrity  Outcome: Met  Goal: Optimal Wound Healing  Outcome: Met     Problem: Fall Injury Risk  Goal: Absence of Fall and Fall-Related Injury  Outcome: Met     Problem: Pain Acute  Goal: Optimal Pain Control and Function  Outcome: Met

## 2024-10-03 NOTE — CONSULTS
"  [facility] - [address] [place] [address] Surg Telemetry  Adult Nutrition  Consult Note    SUMMARY     Recommendations    Nutrition Goal Status: progressing towards goal  Communication of RD Recs: discussed on rounds    Assessment and Plan    No new Assessment & Plan notes have been filed under this hospital service since the last note was generated.  Service: Nutrition     Malnutrition Assessment  Malnutrition Level: other (see comments) (Pt does not meet criteria for malnutrition.)    Reason for Assessment    Reason For Assessment: consult  Diagnosis: other (see comments) (s/p VSG)  General Information Comments: Educated pt on post-bariatric surgery protocol for nutrition, hydration, vitamins, protein, medications, and exercise  Nutrition Discharge Planning: Follow protocol as instructed by bariatric team    Nutrition Risk Screen    Nutrition Risk Screen: no indicators present    Nutrition/Diet History    Spiritual, Cultural Beliefs, Sabianism Practices, Values that Affect Care: no  Factors Affecting Nutritional Intake: dry mouth, decreased appetite, early satiety    Anthropometrics    Temp: 98.4 °F (36.9 °C)  Height Method: Stated  Height: 5' 7" (170.2 cm)  Height (inches): 67 in  Weight Method: Standard Scale  Weight: 115.9 kg (255 lb 8.2 oz)  Weight (lb): 255.52 lb  Ideal Body Weight (IBW), Female: 135 lb  % Ideal Body Weight, Female (lb): 189.27 %  BMI (Calculated): 40    Lab/Procedures/Meds    Pertinent Labs Reviewed: reviewed  Pertinent Medications Reviewed: reviewed    Estimated/Assessed Needs    Weight Used For Calorie Calculations: 77 kg (169 lb 12.1 oz)  Energy Calorie Requirements (kcal): 2112-3297  Energy Need Method: Kcal/kg  Protein Requirements: 77-85  Weight Used For Protein Calculations: 77 kg (169 lb 12.1 oz)  Fluid Requirements (mL): 2318  Estimated Fluid Requirement Method: other (see comments) (actual bw in kg x 20)  RDA Method (mL): 1540    Nutrition Prescription Ordered    Current Diet Order: " Bariatric Clear Liquid Diet  Nutrition Order Comments: Progress diet as instructed by bariatric team.    Evaluation of Received Nutrient/Fluid Intake    Energy Calories Required: not meeting needs (as expected)  Protein Required: not meeting needs (as expected)  Fluid Required: meeting needs  % Intake of Estimated Energy Needs: 0 - 25 %  % Meal Intake: 0 - 25 %    Nutrition Risk    Level of Risk/Frequency of Follow-up: low     Monitor and Evaluation    Knowledge/Beliefs/Attitudes: food and nutrition knowledge/skill     Nutrition Follow-Up    RD Follow-up?: Yes (RD to f/u with pt in 2 weeks to progress diet.)

## 2024-10-03 NOTE — DISCHARGE SUMMARY
Admit Diagnosis:   Morbid Obesity    Discharge Diagnosis:   Morbid Obesity    Operations During Hospitalization: Laparoscopic Vertical Sleeve Gastrectomy    Hospital Course: 39 yr old female admitted to [facility] for an elective bariatric procedure. Procedure preformed as stated above. Upon completion of procedure, pt was transferred from the recovery room to the post surgical floor for further care and treatment. POD#1, afebrile, vital signs stable. The pt's diet was advanced to bariatric clear liquids.     Review of Systems: Mild incisional pain reported but tolerable. Some gas pain in chest and in between shoulder blades. Some occasional and intermittent abd spams with and without drinking. No nausea, vomiting, dysphagia, GERD. Patient ambulating in the room/hallway and voiding without difficulty. Pt denies any dizziness, palpitations, SOB, or CP. All other review of systems are negative.     Physical Examination:   Vital signs: stable, noted in chart  General: Awake and alert, able to answer all questions. Resting in bed with family member at bedside  Respiratory:  Clear to auscultation bilaterally  Cardio: Regular rate and rhythm.  Abdomen: Soft, non distended. Bowel sounds present to all 4 quadrants. Lap sites clean, dry, and intact. Abdomen benign.   Neuro: Alert and oriented x's 4.    Labs:  Unremarkable, see chart    Discharge Medication:   Protonix  Zofran  Phenergan  Tylenol, OTC  Toradol    Condition: Satisfactory    Disposition:  To home.   -Pt to f/u with CAIN Sykes in two weeks  -Continue IS at home  -Walk 20min daily   -Continue bariatric clears throughout today and progress dietary protocol as instructed by dietitian tomorrow upon waking up at home

## 2024-10-07 ENCOUNTER — TELEPHONE (OUTPATIENT)
Dept: SURGERY | Facility: CLINIC | Age: 39
End: 2024-10-07
Payer: MEDICAID

## 2024-10-07 NOTE — PROGRESS NOTES
[facility] Neurology Follow Up Office Visit Note    Initial Visit Date: 9/14/2023  Last Visit Date: 4/1/2024  Current Visit Date:  10/08/2024    Chief Complaint:     Chief Complaint   Patient presents with    IIH (idiopathic intracranial hypertension)     Patient has no complaints at this time.       History of Present Illness:      This is 39 y.o. female with history of obesity, pituitary microadenoma, hypertension, who is referred for IIH and episodic migraine without aura.  During last visit, patient was pending PSG. Patient had also undergone gastric sleeve.     Age of Onset : 17 years old      Headache Description:   bilateral occipital, temporal, stabbing, severe, impeding day to day activity, lasting 24 hours, without nausea, with photophobia and phonophobia  Bitemporal, stabbing, mild to moderate, not impeding day to day activity, lasting 24 hours, without nausea, without photophobia and phonophobia     Frequency: 8 migraine headache days per month     Provocation Factors: dietary     Risk Factors  - Family history of headache disorder: Yes maternal grandmother, mother, and daughter with headache  - History of focal CNS lesions: Yes pituitary microadenoma.  - History of CNS infections: No  - History head trauma: No  - History of underlying mood disorder: Yes mood swings   - History of sleep disorder: Yes frequent night awakening. Wake up tired.  - Recreational drug use: No  - Tobacco use: Yes 0.5 ppd. Quit 8 months ago.   - Alcohol use: Yes occasional  - Weight fluctuation: Yes obesity. Starting weight 252 lbs > 274 lbs > 266 lbs > 255 lbs   - Isotretinoin or Tetracycline use:  No  - Family planning and contraceptive use: Actively trying to conceive.    Medications:     Current Prophylactic  Acetazolamide 500 mg twice a day (10/2/2023 to present): paresthesia.       Current Abortive  Rizatriptan 10 mg twice a day as needed (11/20/2023 to present)   Tylenol PRN: ineffective.     Prior Prophylactic  Topiramate 50 mg  "at bedtime (9/14/2023 to 9/24/2023): fatigue. Ineffective.     Prior Abortive  BC powder: daily for the past "few years." Takes 2 packet per day.   Tylenol PRN: every other day for the past few years.   Ibuprofen PRN: every other day for the past few years.    Sumatriptan 50 mg twice a day as needed (3/28/2023 to 9/14/2023): ineffective.    Devices:     - VNS:  - TNS  - TMS:     Procedures:     - Botox:  - PSG block:   - Occipital nerve block:     Labs:     Results for orders placed or performed during the hospital encounter of 10/02/24   Type & Screen    Collection Time: 10/02/24  6:20 AM   Result Value Ref Range    Group & Rh B POS     Indirect Dominic GEL NEG     Specimen Outdate 10/05/2024 23:59    POCT urine pregnancy    Collection Time: 10/02/24  6:36 AM   Result Value Ref Range    POC Preg Test, Ur Negative Negative     Acceptable Yes    Specimen to Pathology    Collection Time: 10/02/24 10:09 AM   Result Value Ref Range    Case Report       [facility]                            Case: [id number]                                 Authorizing Provider:  Gorge Kolb MD        Collected:           10/02/2024 10:09 AM          Ordering Location:     [facility] -       Received:            10/02/2024 12:10 PM                                 Periop Services                                                              Pathologist:           Nivia Taylor MD                                                        Specimen:    Stomach                                                                                    Clinical Information       Procedure:  GASTRECTOMY, SLEEVE, LAPAROSCOPIC  Pre-op Diagnosis:  E66.01 - Morbid obesity [ICD-10-CM]  Post-op Diagnosis:  E66.01 - Morbid obesity [ICD-10-CM]      Final Diagnosis         Stomach, Sleeve gastrectomy:  - Gastric tissue with no significant histopathologic abnormality.         Microscopic Description       A microscopic " "examination was performed and the diagnosis reflects the findings.          Gross Description       1. Stomach:   Received in 10% neutral buffered formalin is an elongated tubular portion of stomach with surgical clips running longitudinally. The specimen measures 18.2 x 4.3 x 3 cm. The serosa is tan, smooth and intact. The lumen contains bloody fluid. The mucosa is pink with the normal rugal folds. Representative sections submitted in cassettes A and B.      Report Footnotes       Unless the case is a "gross only" or additional testing only, the final diagnosis for each specimen is based on a microscopic examination of appropriate tissue sections.     POCT glucose    Collection Time: 10/02/24 11:06 AM   Result Value Ref Range    POCT Glucose 93 70 - 110 mg/dL   POCT glucose    Collection Time: 10/02/24  5:00 PM   Result Value Ref Range    POCT Glucose 104 70 - 110 mg/dL   Basic metabolic panel    Collection Time: 10/03/24  4:19 AM   Result Value Ref Range    Sodium 141 136 - 145 mmol/L    Potassium 3.4 (L) 3.5 - 5.1 mmol/L    Chloride 112 (H) 98 - 107 mmol/L    CO2 21 (L) 22 - 29 mmol/L    Glucose 88 74 - 100 mg/dL    Blood Urea Nitrogen 3.8 (L) 7.0 - 18.7 mg/dL    Creatinine 0.67 0.55 - 1.02 mg/dL    BUN/Creatinine Ratio 6     Calcium 9.2 8.4 - 10.2 mg/dL    Anion Gap 8.0 mEq/L    eGFR >60 mL/min/1.73/m2   CBC with Differential    Collection Time: 10/03/24  4:19 AM   Result Value Ref Range    WBC 6.48 4.50 - 11.50 x10(3)/mcL    RBC 3.79 (L) 4.20 - 5.40 x10(6)/mcL    Hgb 10.6 (L) 12.0 - 16.0 g/dL    Hct 33.1 (L) 37.0 - 47.0 %    MCV 87.3 80.0 - 94.0 fL    MCH 28.0 27.0 - 31.0 pg    MCHC 32.0 (L) 33.0 - 36.0 g/dL    RDW 14.2 11.5 - 17.0 %    Platelet 211 130 - 400 x10(3)/mcL    MPV 9.9 7.4 - 10.4 fL    Neut % 73.9 %    Lymph % 18.8 %    Mono % 6.6 %    Eos % 0.2 %    Basophil % 0.2 %    Lymph # 1.22 0.6 - 4.6 x10(3)/mcL    Neut # 4.79 2.1 - 9.2 x10(3)/mcL    Mono # 0.43 0.1 - 1.3 x10(3)/mcL    Eos # 0.01 0 - 0.9 " x10(3)/mcL    Baso # 0.01 <=0.2 x10(3)/mcL    IG# 0.02 0 - 0.04 x10(3)/mcL    IG% 0.3 %    NRBC% 0.0 %   Gold Top Hold    Collection Time: 10/03/24  4:19 AM   Result Value Ref Range    Extra Tube Hold for add-ons.        Studies:     - MRI Brain +/- Amol 11/22/2022:  I have reviewed the study independently and with the patient. Metallic artifact. Subtle posterior globe flattening OU.   - MRV Head w/o Amol 9/26/2023:  I have reviewed the study independently and with the patient. Unremarkable.    - Lumbar Puncture 9/28/2023: Opening pressure: 37 cm CSF. Closing pressure: 26 cm CSF    Review of Systems:     Review of Systems   All other systems reviewed and are negative.      Physical Exams:     Vitals:    10/08/24 1539   BP: 103/69   Pulse: 75   Resp: 18   Temp: 98.9 °F (37.2 °C)           Physical Exam  Vitals and nursing note reviewed.   Constitutional:       Appearance: Normal appearance.   HENT:      Head: Normocephalic and atraumatic.      Comments: Hagen III     Nose: Nose normal.      Mouth/Throat:      Mouth: Mucous membranes are moist.      Pharynx: Oropharynx is clear.   Eyes:      Conjunctiva/sclera: Conjunctivae normal.   Cardiovascular:      Rate and Rhythm: Normal rate and regular rhythm.      Pulses: Normal pulses.   Pulmonary:      Effort: Pulmonary effort is normal.      Breath sounds: Normal breath sounds.   Abdominal:      General: Abdomen is flat.   Musculoskeletal:         General: Normal range of motion.      Cervical back: Normal range of motion.   Skin:     General: Skin is warm.   Neurological:      Mental Status: She is alert.       Comprehensive Neurological Exam:  Mental Status: Alert Oriented to Self, Date, and Place. Comprehension wnl. No dysarthria.   CN II - XII: EZRA, No APD, OU fundus normal, VFFC, No ptosis OU, EOMI without nystagmus LT/Temp symmetric in CN V1-3 distribution, Hearing grossly intact, Face Symmetric, Tongue and Uvula midline, Trapezius symmetric bilateral.   Motor:  tone and bulk wnl throughout, no abnormal involuntary or voluntary movements, 5/5 to confrontation, Fine finger movements wnl b/l, No pronator drift.   Sensory: LT, Proprioception, Vibration, PP, Temp symmetric.  Reflexes: 2+ throughout  Cerebellar: FNF wnl b/l, RAHM wnl b/l  Romberg: Negative  Gait: normal, toe gait within normal limits, tandem gait wnl.     Assessment:     This is 39 y.o. female with history of obesity, pituitary microadenoma, hypertension, who is referred for IIH and episodic migraine without aura.     Problem List Items Addressed This Visit          Neuro    IIH (idiopathic intracranial hypertension) - Primary (Chronic)    Relevant Medications    acetaZOLAMIDE (DIAMOX) 500 mg CpSR           Plan:     [] weight loss and IIH remission discussed with patient: would recommend losing approximately 10% of starting weight (225 lb goal weight) for IIH to be in remission.   [] continue with Acetazolamide 500 mg twice a day  [] continue with Rizatriptan 10 mg twice a day as needed      RTC 6 months in clinic    Visit today is associated with current or anticipated ongoing medical care related to this patient's single serious condition/complex condition as documented above.     Headache education provided: good sleep hygiene and 7 hours of sleep per night, stress management, medication overuse education provided. Using more 3 OTC per week may worsen headaches, high intensity interval training has shown to reduce headache frequency. Low carb, high protein has shown to reduce headache frequency. Patient is instructed in keep headache diary.     I have explained the treatment plan, diagnosis, and prognosis to patient. All questions are answered to the best of my knowledge.     Face to face time 40 minutes, including documentation, chart review, counseling, education, review of test results, relevant medical records, and coordination of care.       Liane Salas MD   General Neurology  10/08/2024

## 2024-10-08 ENCOUNTER — OFFICE VISIT (OUTPATIENT)
Dept: NEUROLOGY | Facility: CLINIC | Age: 39
End: 2024-10-08
Payer: MEDICAID

## 2024-10-08 ENCOUNTER — PATIENT MESSAGE (OUTPATIENT)
Dept: BARIATRICS | Facility: HOSPITAL | Age: 39
End: 2024-10-08
Payer: MEDICAID

## 2024-10-08 ENCOUNTER — TELEPHONE (OUTPATIENT)
Dept: SURGERY | Facility: CLINIC | Age: 39
End: 2024-10-08
Payer: MEDICAID

## 2024-10-08 ENCOUNTER — TELEPHONE (OUTPATIENT)
Dept: BARIATRICS | Facility: HOSPITAL | Age: 39
End: 2024-10-08
Payer: MEDICAID

## 2024-10-08 VITALS
HEIGHT: 67 IN | DIASTOLIC BLOOD PRESSURE: 69 MMHG | WEIGHT: 255.38 LBS | OXYGEN SATURATION: 99 % | TEMPERATURE: 99 F | RESPIRATION RATE: 18 BRPM | HEART RATE: 75 BPM | SYSTOLIC BLOOD PRESSURE: 103 MMHG | BODY MASS INDEX: 40.08 KG/M2

## 2024-10-08 DIAGNOSIS — E66.01 CLASS 3 SEVERE OBESITY DUE TO EXCESS CALORIES WITH SERIOUS COMORBIDITY AND BODY MASS INDEX (BMI) OF 45.0 TO 49.9 IN ADULT: Chronic | ICD-10-CM

## 2024-10-08 DIAGNOSIS — Z98.84 HX OF BARIATRIC SURGERY: Primary | ICD-10-CM

## 2024-10-08 DIAGNOSIS — G93.2 IIH (IDIOPATHIC INTRACRANIAL HYPERTENSION): Primary | ICD-10-CM

## 2024-10-08 DIAGNOSIS — E66.813 CLASS 3 SEVERE OBESITY DUE TO EXCESS CALORIES WITH SERIOUS COMORBIDITY AND BODY MASS INDEX (BMI) OF 45.0 TO 49.9 IN ADULT: Chronic | ICD-10-CM

## 2024-10-08 PROCEDURE — 99213 OFFICE O/P EST LOW 20 MIN: CPT | Mod: PBBFAC | Performed by: PSYCHIATRY & NEUROLOGY

## 2024-10-08 PROCEDURE — 1159F MED LIST DOCD IN RCRD: CPT | Mod: CPTII,,, | Performed by: PSYCHIATRY & NEUROLOGY

## 2024-10-08 PROCEDURE — 3074F SYST BP LT 130 MM HG: CPT | Mod: CPTII,,, | Performed by: PSYCHIATRY & NEUROLOGY

## 2024-10-08 PROCEDURE — 1111F DSCHRG MED/CURRENT MED MERGE: CPT | Mod: CPTII,,, | Performed by: PSYCHIATRY & NEUROLOGY

## 2024-10-08 PROCEDURE — G2211 COMPLEX E/M VISIT ADD ON: HCPCS | Mod: S$PBB,,, | Performed by: PSYCHIATRY & NEUROLOGY

## 2024-10-08 PROCEDURE — 99215 OFFICE O/P EST HI 40 MIN: CPT | Mod: S$PBB,,, | Performed by: PSYCHIATRY & NEUROLOGY

## 2024-10-08 PROCEDURE — 3078F DIAST BP <80 MM HG: CPT | Mod: CPTII,,, | Performed by: PSYCHIATRY & NEUROLOGY

## 2024-10-08 PROCEDURE — 3044F HG A1C LEVEL LT 7.0%: CPT | Mod: CPTII,,, | Performed by: PSYCHIATRY & NEUROLOGY

## 2024-10-08 PROCEDURE — 3060F POS MICROALBUMINURIA REV: CPT | Mod: CPTII,,, | Performed by: PSYCHIATRY & NEUROLOGY

## 2024-10-08 PROCEDURE — 3008F BODY MASS INDEX DOCD: CPT | Mod: CPTII,,, | Performed by: PSYCHIATRY & NEUROLOGY

## 2024-10-08 PROCEDURE — 3066F NEPHROPATHY DOC TX: CPT | Mod: CPTII,,, | Performed by: PSYCHIATRY & NEUROLOGY

## 2024-10-08 RX ORDER — ACETAZOLAMIDE 500 MG/1
500 CAPSULE, EXTENDED RELEASE ORAL 2 TIMES DAILY
Qty: 60 CAPSULE | Refills: 5 | Status: SHIPPED | OUTPATIENT
Start: 2024-10-08 | End: 2024-10-08 | Stop reason: SDUPTHER

## 2024-10-08 RX ORDER — HYDROCHLOROTHIAZIDE 12.5 MG/1
TABLET ORAL
COMMUNITY
Start: 2024-09-14

## 2024-10-08 RX ORDER — ACETAZOLAMIDE 500 MG/1
500 CAPSULE, EXTENDED RELEASE ORAL 2 TIMES DAILY
Qty: 60 CAPSULE | Refills: 5 | Status: SHIPPED | OUTPATIENT
Start: 2024-10-08 | End: 2025-04-06

## 2024-10-08 RX ORDER — MEDROXYPROGESTERONE ACETATE 10 MG/1
10 TABLET ORAL
COMMUNITY
Start: 2024-09-18

## 2024-10-08 RX ORDER — ESCITALOPRAM OXALATE 10 MG/1
10 TABLET ORAL
COMMUNITY
Start: 2024-09-18

## 2024-10-08 NOTE — TELEPHONE ENCOUNTER
Date call was completed: 10/8/24  Date of Surgery: 10/2/24  Surgery type: sleeve gastrectomy    Are you drinking the recommended amount of water (73-100oz) a day? [x]  Yes        []  No  If not, how may ounces of water are you drinking? 70 oz    Are you drinking the recommended amount of protein a day?(recommendations base on individual goal) []  Yes        [x]  No  If not, how many grams of protein are you drinking? 45-60 g    Are you taking the recommended supplements that were discussed in pre-op class?  [x]  Yes   [] No  Multivitamin [x]  Yes        []  No  Calcium Supplement [x]  Yes        []  No  Iron []  Yes        []  No   ---- had iron infusion on 10/1 day before surgery  Miralax []  Yes        []  No    Are you walking at least 20 minutes a day for exercise? [x]  Yes   [] No    Have you resumed your home medications that you were instructed to resume? [x]  Yes   [] No    Do you have a follow-up appt scheduled with your family doctor or doctor treating you for you co-morb conditions within the week? []  Yes   [x] No    Are you taking the Protonix (at night) that was prescribed to you in the hospital? [x]  Yes   [] No    Are you showering daily with an antibacterial soap?  [x]  Yes   [] No    Have you had a bowel movement since surgery? []  Yes   [x] No  - Talked to Juliana Arias NP earlier who referred her a regimen to help her bms

## 2024-10-08 NOTE — TELEPHONE ENCOUNTER
1 wk s/p VSG   Called this am, has not had a BM since surgery. No real abd cramping except for some on Right side but also is some incisional pain as well. Passing gas does relieve some of the discomfort. She is taking 1capful of MiraLAX daily and getting in 4 bottles of water dialy.    Plan:   - Told to take senna give herself a few hours and if nothing take a - suppository.   - Call back this afternoon to give us an update on bowel movement  - Continue miralax daily but ok to increase to 1.5-2 capfuls daily.    Girls  Recall tomorrow for update if she does not call back

## 2024-10-09 NOTE — TELEPHONE ENCOUNTER
Still unable to have a BM   Recommended 1/2 bottle mag citrate with sugar free gatorade if nothing in 4-6 hours do the other half with sugar free gatorade     Do you have any other recommendations?    Will call and check status of patient tomorrow

## 2024-10-10 NOTE — TELEPHONE ENCOUNTER
Pt had small BM of diarrhea. Does not feel lke she is constipated otherwise doing well. Plan is to just continue to take Miralax.

## 2024-10-15 ENCOUNTER — CLINICAL SUPPORT (OUTPATIENT)
Dept: BARIATRICS | Facility: HOSPITAL | Age: 39
End: 2024-10-15

## 2024-10-15 VITALS — BODY MASS INDEX: 40 KG/M2 | HEIGHT: 67 IN

## 2024-10-15 DIAGNOSIS — Z98.84 HISTORY OF BARIATRIC SURGERY: Primary | ICD-10-CM

## 2024-10-15 NOTE — PROGRESS NOTES
"POST OP BARIATRIC NUTRITION FOLLOW UP    Type of surgery: sleeve gastrectomy  Post-op visit:   [x] 2 weeks  [] 4 weeks:  [] 2 months:  [] 4 months:  [] 6 months:  [] 9 months:  [] 1 year:   [] Other:     Height:   Ht Readings from Last 1 Encounters:   10/15/24 5' 7" (1.702 m)      Weight:   Wt Readings from Last 3 Encounters:   10/08/24 1539 115.8 kg (255 lb 6.4 oz)   10/02/24 1341 115.9 kg (255 lb 8.2 oz)   10/02/24 0610 115.9 kg (255 lb 9.6 oz)   09/25/24 0925 118.8 kg (262 lb)   09/27/24 0936 120.7 kg (266 lb 1.5 oz)      BMI:   BMI Readings from Last 1 Encounters:   10/15/24 40.00 kg/m²            Post op complications:   [] Yes [x] No  If yes: [] Nausea   [] Vomiting   [] Constipation    [] Diarrhea    [] Other:     Dietary tolerance:  [x] Yes [] No [] Comment:     Adequate fluid intake:  [x] Yes [] No Approx. daily fluid intake:   Adequate protein intake:  [] Yes [x] No  Approx. daily protein intake:    Vitamins/Minerals:   [x] MVI:    [] Biotin:  [x] Calcium:    [] Hair/Nails:  [] B 50 complex:   [] Bariatric Specific MVI:  [] B12:    [] Bariatric Specific Calcium:  [x] Iron:    [] Other:   [] Non-compliance:        Labs:   not ordered for this visit.  Comment:    Expected compliance:  [x]Good  []Fair  []Poor      Progress:   [x]Patient progressing well at this time with no complaints   [] Patient expressed complaint at this time: See additional notes       Bariatric Diet Education:   Verbalizes understanding Demonstrates  Needs further teaching Needs practice/ supervision Comments    Bariatric Clear [] [] [] []    Bariatric Full [] [] [] []    Bariatric Puree [x] [] [] []    Bariatric Soft [x] [] [] []    Bariatric Regular [] [] [] []    Bariatric Reintroduction of Starchy CHO [] [] [] []    Bariatric: Mindful Eating [] [] [] []    Importance of Protein and Vitamin Protocol [] [] [] []    Other:            Additional Notes:        "

## 2024-11-01 ENCOUNTER — OFFICE VISIT (OUTPATIENT)
Dept: SURGERY | Facility: CLINIC | Age: 39
End: 2024-11-01
Payer: MEDICAID

## 2024-11-01 VITALS
HEIGHT: 67 IN | SYSTOLIC BLOOD PRESSURE: 113 MMHG | WEIGHT: 241 LBS | HEART RATE: 72 BPM | BODY MASS INDEX: 37.83 KG/M2 | DIASTOLIC BLOOD PRESSURE: 80 MMHG

## 2024-11-01 DIAGNOSIS — E66.01 MORBID OBESITY: ICD-10-CM

## 2024-11-01 DIAGNOSIS — Z13.0 SCREENING, ANEMIA, DEFICIENCY, IRON: ICD-10-CM

## 2024-11-01 DIAGNOSIS — Z91.89 ELECTROLYTE IMBALANCE RISK: ICD-10-CM

## 2024-11-01 DIAGNOSIS — Z90.3 S/P GASTRIC SLEEVE PROCEDURE: ICD-10-CM

## 2024-11-01 DIAGNOSIS — Z13.21 ENCOUNTER FOR VITAMIN DEFICIENCY SCREENING: Primary | ICD-10-CM

## 2024-11-01 DIAGNOSIS — Z71.82 EXERCISE COUNSELING: ICD-10-CM

## 2024-11-01 DIAGNOSIS — Z71.3 DIETARY COUNSELING: ICD-10-CM

## 2024-11-01 RX ORDER — CALCIUM CARBONATE 200(500)MG
1 TABLET,CHEWABLE ORAL DAILY
COMMUNITY

## 2024-11-01 RX ORDER — MULTIVIT WITH IRON,MINERALS
TABLET,CHEWABLE ORAL
COMMUNITY

## 2024-11-01 RX ORDER — LANOLIN ALCOHOL/MO/W.PET/CERES
1 CREAM (GRAM) TOPICAL
COMMUNITY

## 2024-11-08 ENCOUNTER — TELEPHONE (OUTPATIENT)
Dept: ENDOCRINOLOGY | Facility: CLINIC | Age: 39
End: 2024-11-08
Payer: MEDICAID

## 2024-11-08 DIAGNOSIS — E22.9 PITUITARY MICROADENOMA WITH HYPERPROLACTINEMIA: ICD-10-CM

## 2024-11-08 DIAGNOSIS — D35.2 PITUITARY MICROADENOMA WITH HYPERPROLACTINEMIA: ICD-10-CM

## 2024-11-08 NOTE — TELEPHONE ENCOUNTER
----- Message from Sosa sent at 11/8/2024  2:24 PM CST -----  Patient of Stout    Patient called asking for a nurse to return the call.  She did not want to discuss the issue.      2:25  Thanks,

## 2024-11-08 NOTE — TELEPHONE ENCOUNTER
Returned patient call, patient states she has leakage from the right breast Patient states it happens during bath time when area is wet with warm  water , patient states it looks like pus or milk, denies any foul odor, pain or soreness. Patient states she took a pregnancy test and results were negative. Patient ask if he can be due to starting Carbergoline  a month ago. Please Advise

## 2024-11-08 NOTE — TELEPHONE ENCOUNTER
Patient states complete dosage will propose refill ,patient states she has no refills. Verbalize understanding of plan  LV 6/18/2024  NV 12/20/24

## 2024-11-08 NOTE — TELEPHONE ENCOUNTER
If it is milk, it would not be due to starting cabergoline but instead needing to follow through with taking cabergoline and titrating it to the goal of normalizing her prolactin levels.  This is what the plan is with having her on the medication consistently and getting labs prior to f/u next month to see if her doses need to be adjusted.    If her breasts develop signs/symptoms of infection/inflammation, recommend instead getting seen by PCP or urgent care for further eval of other causes.    If pt remains concerned and wants earlier appt, is reasonable to offer her a slot on Thursday 11/14 (can go to 11) or Friday 11/15 (can go to 9) to further sort with her having prl drawn next week.

## 2024-11-11 RX ORDER — CABERGOLINE 0.5 MG/1
TABLET ORAL
Qty: 12 TABLET | Refills: 0 | Status: SHIPPED | OUTPATIENT
Start: 2024-11-11

## 2024-11-11 NOTE — TELEPHONE ENCOUNTER
Called pt, she asked if she still needs to take cabergoline, informed provider just refilled her rx and to take 1/2 tablet twice a week. Pt voiced understanding.

## 2024-11-11 NOTE — TELEPHONE ENCOUNTER
Cece Vásquez [facility] Endocrinology Clinical Support Staff  Caller: Unspecified (Today,  8:43 AM)  Patient of Dr Concepcion    Patient will keep follow up in Dec/2024, patient requesting call back regarding medication.    Please contact when available    [phone]

## 2024-11-12 NOTE — TELEPHONE ENCOUNTER
Received pt to postop MH from MRI via stretcher. Pt arrived sedated on O2 via FM @ 10L, has audible congestion. Pt airway was cleared via suction and cough was induced. Pt VS obtained and are stabled. Pt in NAD. Will continue to monitor. Dr. Cardenas was called for clearance to discharge d/t no preop VS.    Returned patient's call. No answer, LVM

## 2024-11-26 ENCOUNTER — TELEPHONE (OUTPATIENT)
Dept: SURGERY | Facility: CLINIC | Age: 39
End: 2024-11-26
Payer: MEDICAID

## 2024-11-26 NOTE — TELEPHONE ENCOUNTER
Spoke with patient she will go to [facility] or [facility] to have her labs completed prior to her appt   Orders in chart   Pt is aware

## 2024-12-02 NOTE — PROGRESS NOTES
"Progress Note  Ida Saxena  Chief Complaint   Patient presents with    Post-op Evaluation     VSG 10/02/24- 2 month fu (Medicaid)       Ms. Ida Saxena is a 39 y.o. female who is 2 mth s/p lap gastric sleeve surgery on 10/2/24 by Gorge Kolb MD. Presents today for 2 mth follow up appt. No complaints with tolerating PO. No dysphagia, odynophagia, GERD, NV, or abd pain. Regular BMs.     No problems! Feeling great!      Diet: compliant with bariatric meal plan  Exercise: regular exercise, walking and running, starting bootcamp Monday.   Vitamins: compliant with bariatric supplementation but had slacked off in the last few weeks    Labs (12/5/24): iron: 46, iron sat: 19%, b1 pending.      Ht: 67in  Weights:   Trend Weights BMI   Starting 281# 43   Pre-Op 276# 43   1 mth 241#  37   2 Month  229# 35   6 Month        1 Year       2 Year                  For Adults 20 Years and Older:  BMI Weight Status   Below 18.5 Underweight   18.6-24.9 Normal/Healthy   25.0-29.9 Overweight   30.0 & Above Obese      Ideal Weight Range for Your Height: 5'7" = 121 - 158 lbs                            Pertinent History:  HTN - [] Yes   [] No    [x] Resolved  HLD - [] Yes   [x] No    [] Resolved  DM  -  [] Yes   [x] No    [] Resolved  DESTINY - [] Yes   [x] No   [] Resolved  GERD - [] Yes   [] No [x] Resolved  Anticoagulation- [] Yes   [x] No      If yes, type: [] ASA [] Plavix [] Other    Patient Care Team:  Yue Mccabe FNP as PCP - General (Family Medicine)  Gorge Kolb MD as Surgeon (Bariatrics)  Liane Salas MD as Consulting Physician (Neurology)  David Concepcion MD as Consulting Physician (Endocrinology)  Brandon Ariza MD as Consulting Physician (Neurosurgery)     Subjective:     12 point review of systems conducted, negative except as stated in the history of present illness. See HPI for details.    Review of patient's allergies indicates:  No Known Allergies  Past Medical History:   Diagnosis Date " "   Abnormal uterine bleeding     Anemia 2022    Encounter for Papanicolaou smear of cervix 2023    Former cigarette smoker     Hypertension     Idiopathic intracranial hypertension     Migraines     Missed period 2023    Papilledema     Pituitary microadenoma      Past Surgical History:   Procedure Laterality Date     SECTION      x 3    DILATION AND CURETTAGE OF UTERUS N/A 2023    Procedure: DILATION AND CURETTAGE, UTERUS fx;  Surgeon: Jose Canales Jr., MD;  Location: [facility] OR;  Service: OB/GYN;  Laterality: N/A;    ESOPHAGOGASTRODUODENOSCOPY N/A 2024    Procedure: EGD (ESOPHAGOGASTRODUODENOSCOPY);  Surgeon: Gorge Kolb MD;  Location: [facility] OR;  Service: General;  Laterality: N/A;    LAPAROSCOPIC SLEEVE GASTRECTOMY N/A 10/02/2024    Procedure: GASTRECTOMY, SLEEVE, LAPAROSCOPIC;  Surgeon: Gorge Kolb MD;  Location: [facility] OR;  Service: General;  Laterality: N/A;    SLEEVE GASTROPLASTY       Family History   Problem Relation Name Age of Onset    Hypothyroidism Mother      Heart murmur Mother      Heart failure Father      Cancer Father      No Known Problems Daughter      No Known Problems Son      No Known Problems Son      Colon cancer Maternal Grandmother      Breast cancer Other          Mother's sister     Social History     Tobacco Use    Smoking status: Former     Current packs/day: 0.00     Average packs/day: 0.5 packs/day for 15.0 years (7.5 ttl pk-yrs)     Types: Cigarettes, Vaping with nicotine     Start date: 2006     Quit date: 2021     Years since quitting: 3.9    Smokeless tobacco: Never    Tobacco comments:     Patient states "I stop smoking since ", stopping vaping since .    Substance Use Topics    Alcohol use: Not Currently     Alcohol/week: 2.0 standard drinks of alcohol     Types: 2 Glasses of wine per week     Comment: occassionally, last drink 2024    Drug use: Never      Current Outpatient Medications   Medication " "Instructions    acetaminophen (TYLENOL) 1,000 mg, Oral, Every 6 hours PRN    acetaZOLAMIDE (DIAMOX) 500 mg, Oral, 2 times daily    cabergoline (DOSTINEX) 0.5 mg tablet Take 1/2 tablet po qhs twice a week    calcium carbonate (TUMS) 200 mg calcium (500 mg) chewable tablet 1 tablet, Daily    EScitalopram oxalate (LEXAPRO) 10 mg    ferrous sulfate (FEOSOL) Tab tablet 1 tablet, With breakfast    hyoscyamine (ANASPAZ,LEVSIN) 125 mcg, Oral, Every 4 hours PRN    medroxyPROGESTERone (PROVERA) 10 mg    pantoprazole (PROTONIX) 40 mg, Oral, Daily, Fill full prescription even if you do not have reflux    pediatric multivit-iron-min (FLINTSTONES COMPLETE, IRON,) Chew Take by mouth.       Objective:     Vital Signs (Most Recent):  Visit Vitals  /77   Pulse 73   Ht 5' 7" (1.702 m)   Wt 103.9 kg (229 lb)   BMI 35.87 kg/m²       Physical Exam:  General:  Alert and oriented.    Respiratory:  Lungs are clear to auscultation, Respirations are non-labored, Breath sounds are equal.    Cardiovascular:  Normal rate, Regular rhythm, No murmur.    Gastrointestinal:  Soft, Non-tender, Non-distended, Normal bowel sounds    Musculoskeletal:  Normal range of motion, Normal strength.    Integumentary:  Warm, Dry, Pink.    Neurologic:  Alert, Oriented.    Psychiatric:  Cooperative.      Assessment:       ICD-10-CM ICD-9-CM   1. Encounter for vitamin deficiency screening  Z13.21 V77.99   2. Screening, anemia, deficiency, iron  Z13.0 V78.0   3. Electrolyte imbalance risk  Z91.89 V49.89   4. S/P gastric sleeve procedure  Z90.3 V45.75   5. Dietary counseling  Z71.3 V65.3   6. Exercise counseling  Z71.82 V65.41   7. Encounter for weight loss counseling  Z71.3 V65.3       Plan:     1. Encounter for vitamin deficiency screening  Vitamin B1, WB    Vitamin B12      2. Screening, anemia, deficiency, iron  Iron and TIBC    CBC Auto Differential      3. Electrolyte imbalance risk  Comprehensive Metabolic Panel      4. S/P gastric sleeve procedure      "   5. Dietary counseling        6. Exercise counseling        7. Encounter for weight loss counseling            Plan:  - gave pt information about the bariatric one a day multivitamin. Need to continue calcium supplements while on them. continue  vitamin since it contains iron. please eat before taking, it can make pt's very nauseated on empty stomach.   - b1 pending   - pt understands that she needs to increase intensity of exercise. Discuss with exercise physiologist what is appropriate at this time.   - F/U 4 months with bariatric labs  - Continue diet  - Continue vitamin supplementation  - Support group attendance encouraged  - Keep routine appts with PCP/specialists as scheduled

## 2024-12-05 ENCOUNTER — LAB VISIT (OUTPATIENT)
Dept: LAB | Facility: HOSPITAL | Age: 39
End: 2024-12-05
Attending: SURGERY
Payer: MEDICAID

## 2024-12-05 DIAGNOSIS — D35.2 PITUITARY MICROADENOMA WITH HYPERPROLACTINEMIA: ICD-10-CM

## 2024-12-05 DIAGNOSIS — Z91.89 ELECTROLYTE IMBALANCE RISK: ICD-10-CM

## 2024-12-05 DIAGNOSIS — Z13.21 ENCOUNTER FOR VITAMIN DEFICIENCY SCREENING: ICD-10-CM

## 2024-12-05 DIAGNOSIS — E22.9 PITUITARY MICROADENOMA WITH HYPERPROLACTINEMIA: ICD-10-CM

## 2024-12-05 DIAGNOSIS — Z13.0 SCREENING, ANEMIA, DEFICIENCY, IRON: ICD-10-CM

## 2024-12-05 LAB
ALBUMIN SERPL-MCNC: 3.7 G/DL (ref 3.5–5)
ALBUMIN/GLOB SERPL: 1.2 RATIO (ref 1.1–2)
ALP SERPL-CCNC: 57 UNIT/L (ref 40–150)
ALT SERPL-CCNC: 14 UNIT/L (ref 0–55)
ANION GAP SERPL CALC-SCNC: 6 MEQ/L
AST SERPL-CCNC: 14 UNIT/L (ref 5–34)
BASOPHILS # BLD AUTO: 0.03 X10(3)/MCL
BASOPHILS NFR BLD AUTO: 0.7 %
BILIRUB SERPL-MCNC: 0.4 MG/DL
BUN SERPL-MCNC: 9.9 MG/DL (ref 7–18.7)
CALCIUM SERPL-MCNC: 9.5 MG/DL (ref 8.4–10.2)
CHLORIDE SERPL-SCNC: 113 MMOL/L (ref 98–107)
CO2 SERPL-SCNC: 21 MMOL/L (ref 22–29)
CREAT SERPL-MCNC: 0.68 MG/DL (ref 0.55–1.02)
CREAT/UREA NIT SERPL: 15
EOSINOPHIL # BLD AUTO: 0.08 X10(3)/MCL (ref 0–0.9)
EOSINOPHIL NFR BLD AUTO: 2 %
ERYTHROCYTE [DISTWIDTH] IN BLOOD BY AUTOMATED COUNT: 14.2 % (ref 11.5–17)
GFR SERPLBLD CREATININE-BSD FMLA CKD-EPI: >60 ML/MIN/1.73/M2
GLOBULIN SER-MCNC: 3.1 GM/DL (ref 2.4–3.5)
GLUCOSE SERPL-MCNC: 88 MG/DL (ref 74–100)
HCT VFR BLD AUTO: 39.5 % (ref 37–47)
HGB BLD-MCNC: 12.5 G/DL (ref 12–16)
IMM GRANULOCYTES # BLD AUTO: 0.01 X10(3)/MCL (ref 0–0.04)
IMM GRANULOCYTES NFR BLD AUTO: 0.2 %
IRON SATN MFR SERPL: 19 % (ref 20–50)
IRON SERPL-MCNC: 46 UG/DL (ref 50–170)
LYMPHOCYTES # BLD AUTO: 1.38 X10(3)/MCL (ref 0.6–4.6)
LYMPHOCYTES NFR BLD AUTO: 34.4 %
MCH RBC QN AUTO: 26.8 PG (ref 27–31)
MCHC RBC AUTO-ENTMCNC: 31.6 G/DL (ref 33–36)
MCV RBC AUTO: 84.8 FL (ref 80–94)
MONOCYTES # BLD AUTO: 0.36 X10(3)/MCL (ref 0.1–1.3)
MONOCYTES NFR BLD AUTO: 9 %
NEUTROPHILS # BLD AUTO: 2.15 X10(3)/MCL (ref 2.1–9.2)
NEUTROPHILS NFR BLD AUTO: 53.7 %
NRBC BLD AUTO-RTO: 0 %
PLATELET # BLD AUTO: 259 X10(3)/MCL (ref 130–400)
PMV BLD AUTO: 10.1 FL (ref 7.4–10.4)
POTASSIUM SERPL-SCNC: 3.7 MMOL/L (ref 3.5–5.1)
PROLACTIN LEVEL (OLG): 23.57 NG/ML (ref 5.18–26.53)
PROT SERPL-MCNC: 6.8 GM/DL (ref 6.4–8.3)
RBC # BLD AUTO: 4.66 X10(6)/MCL (ref 4.2–5.4)
SODIUM SERPL-SCNC: 140 MMOL/L (ref 136–145)
TIBC SERPL-MCNC: 200 UG/DL (ref 70–310)
TIBC SERPL-MCNC: 246 UG/DL (ref 250–450)
TRANSFERRIN SERPL-MCNC: 216 MG/DL (ref 180–382)
VIT B12 SERPL-MCNC: 386 PG/ML (ref 213–816)
WBC # BLD AUTO: 4.01 X10(3)/MCL (ref 4.5–11.5)

## 2024-12-05 PROCEDURE — 80053 COMPREHEN METABOLIC PANEL: CPT

## 2024-12-05 PROCEDURE — 84146 ASSAY OF PROLACTIN: CPT

## 2024-12-05 PROCEDURE — 84425 ASSAY OF VITAMIN B-1: CPT

## 2024-12-05 PROCEDURE — 36415 COLL VENOUS BLD VENIPUNCTURE: CPT

## 2024-12-05 PROCEDURE — 82607 VITAMIN B-12: CPT

## 2024-12-05 PROCEDURE — 83540 ASSAY OF IRON: CPT

## 2024-12-05 PROCEDURE — 85025 COMPLETE CBC W/AUTO DIFF WBC: CPT

## 2024-12-06 ENCOUNTER — OFFICE VISIT (OUTPATIENT)
Dept: SURGERY | Facility: CLINIC | Age: 39
End: 2024-12-06
Payer: MEDICAID

## 2024-12-06 ENCOUNTER — CLINICAL SUPPORT (OUTPATIENT)
Dept: BARIATRICS | Facility: HOSPITAL | Age: 39
End: 2024-12-06

## 2024-12-06 ENCOUNTER — PATIENT MESSAGE (OUTPATIENT)
Dept: SURGERY | Facility: CLINIC | Age: 39
End: 2024-12-06

## 2024-12-06 VITALS
DIASTOLIC BLOOD PRESSURE: 77 MMHG | HEART RATE: 73 BPM | HEIGHT: 67 IN | SYSTOLIC BLOOD PRESSURE: 115 MMHG | BODY MASS INDEX: 35.94 KG/M2 | WEIGHT: 229 LBS

## 2024-12-06 VITALS — BODY MASS INDEX: 35.68 KG/M2 | WEIGHT: 227.31 LBS | HEIGHT: 67 IN

## 2024-12-06 DIAGNOSIS — Z71.3 ENCOUNTER FOR WEIGHT LOSS COUNSELING: ICD-10-CM

## 2024-12-06 DIAGNOSIS — Z90.3 S/P GASTRIC SLEEVE PROCEDURE: ICD-10-CM

## 2024-12-06 DIAGNOSIS — Z91.89 ELECTROLYTE IMBALANCE RISK: ICD-10-CM

## 2024-12-06 DIAGNOSIS — E66.9 OBESITY: Primary | ICD-10-CM

## 2024-12-06 DIAGNOSIS — Z71.3 DIETARY COUNSELING: ICD-10-CM

## 2024-12-06 DIAGNOSIS — Z13.21 ENCOUNTER FOR VITAMIN DEFICIENCY SCREENING: Primary | ICD-10-CM

## 2024-12-06 DIAGNOSIS — Z98.84 HX OF BARIATRIC SURGERY: Primary | ICD-10-CM

## 2024-12-06 DIAGNOSIS — Z13.0 SCREENING, ANEMIA, DEFICIENCY, IRON: ICD-10-CM

## 2024-12-06 DIAGNOSIS — Z71.82 EXERCISE COUNSELING: ICD-10-CM

## 2024-12-06 NOTE — PROGRESS NOTES
"POST OP BARIATRIC NUTRITION FOLLOW UP    Type of surgery: sleeve gastrectomy  Post-op visit:   [] 2 weeks  [] 4 weeks:  [x] 2 months:  [] 4 months:  [] 6 months:  [] 9 months:  [] 1 year:   [] Other:     Height:   Ht Readings from Last 1 Encounters:   11/01/24 5' 7" (1.702 m)      Weight:   Wt Readings from Last 3 Encounters:   11/01/24 0837 109.3 kg (241 lb)   10/08/24 1539 115.8 kg (255 lb 6.4 oz)   10/02/24 1341 115.9 kg (255 lb 8.2 oz)   10/02/24 0610 115.9 kg (255 lb 9.6 oz)   09/25/24 0925 118.8 kg (262 lb)      BMI:   BMI Readings from Last 1 Encounters:   11/01/24 37.75 kg/m²     Post op complications:   [] Yes [x] No  If yes: [] Nausea   [] Vomiting   [] Constipation    [] Diarrhea    [] Other:     Dietary tolerance:  [x] Yes [] No [] Comment:     Adequate fluid intake:  [x] Yes [] No Approx. daily fluid intake: 70-90 oz  Adequate protein intake:  [] Yes [] No  Approx. daily protein intake: 60-77    Vitamins/Minerals:   [] MVI:    [] Biotin:  [] Calcium:    [] Hair/Nails:  [] B 50 complex:   [] Bariatric Specific MVI:  [] B12:    [] Bariatric Specific Calcium:  [] Iron:    [] Other:   [x] Non-compliance:  Iron, MVI, Ca    Labs:   Iron (L)  Comment:    Expected compliance:  [x]Good  []Fair  []Poor    Progress:   [x]Patient progressing well at this time with no complaints   [] Patient expressed complaint at this time: See additional notes     Bariatric Diet Education:   Verbalizes understanding Demonstrates  Needs further teaching Needs practice/ supervision Comments    Bariatric Clear [] [] [] []    Bariatric Full [] [] [] []    Bariatric Puree [] [] [] []    Bariatric Soft [] [] [] []    Bariatric Regular [x] [] [] []    Bariatric Reintroduction of Starchy CHO [] [] [] []    Bariatric: Mindful Eating [x] [] [] []    Importance of Protein and Vitamin Protocol [x] [] [] []    Other:          Additional Notes:   Labs showed pt's iron was low- pt admitted to non-compliance of vitamins. Discussed importance of " this and rec'd a bariatric MVI with 45 mg of iron to help improve compliance. Also rec'd increase protein intake- gave her protein snack list to help her accomplish this. Discussed progressing to stage 5 regular bariatric diet and restricting starches until 6 months post op.

## 2024-12-06 NOTE — PROGRESS NOTES
A 2 month F/U post op visit was conducted in the office. Patient's current weight is 227.3. A body composition and measurements were conducted.  Patient was educated on results. She lost 50 lbs. And 23.5 inches. She is walking outdoors 2 miles daily and plans to start a bootcamp on Monday. Patient was advised to eat all her protein and hydrate.       PLAN:  - Patient will walk 30 minutes or more 3-5x/week and start strengthening exercises 2x/week.  - Patient will follow dietary advice from LASHAWN Durand RD.    It is my professional opinion that patient is in the action stage of behavior change.    FORTINO Zavaleta, CPT, CHC

## 2024-12-10 LAB — VIT B1 BLD-SCNC: 73 NMOL/L (ref 70–180)

## 2024-12-19 DIAGNOSIS — D35.2 PITUITARY MICROADENOMA WITH HYPERPROLACTINEMIA: ICD-10-CM

## 2024-12-19 DIAGNOSIS — E22.9 PITUITARY MICROADENOMA WITH HYPERPROLACTINEMIA: ICD-10-CM

## 2024-12-19 RX ORDER — CABERGOLINE 0.5 MG/1
TABLET ORAL
Qty: 12 TABLET | Refills: 2 | Status: SHIPPED | OUTPATIENT
Start: 2024-12-19

## 2024-12-19 NOTE — TELEPHONE ENCOUNTER
----- Message from Cece sent at 12/19/2024  1:00 PM CST -----  Patient of  Dr Concepcion     Spoke with patient regarding changed appointment due to provider being out     Patient is requesting refills on all medication prescribed by Dr Concepcion .    Patient doesn't  know the name of medications, she is currently at work.    Please send to pharmacy as listed on the chart     Thanks    [phone]

## 2025-03-25 ENCOUNTER — PATIENT MESSAGE (OUTPATIENT)
Dept: SURGERY | Facility: CLINIC | Age: 40
End: 2025-03-25
Payer: MEDICAID

## 2025-03-28 ENCOUNTER — TELEPHONE (OUTPATIENT)
Dept: SURGERY | Facility: CLINIC | Age: 40
End: 2025-03-28
Payer: MEDICAID

## 2025-04-01 NOTE — PROGRESS NOTES
"Progress Note  Ida Saxena  Chief Complaint   Patient presents with    Follow-up     VSG 10/02/24- 6 month fu (Medicaid)       Ms. Ida Saxena is a 39 y.o. female who is 6 mth s/p lap gastric sleeve surgery on 10/2/24 by Gorge Kolb MD. Presents today for 6 mth follow up appt. No complaints with tolerating PO. No dysphagia, odynophagia, GERD, NV, or abd pain. Regular BMs.      - increase in belching   - Abd pannus with occasional fungal infections, would like some powder  - Asking if her weight loss is appropriate.   - Mild consitpation at times  - Hair loss, asking what she can do      Diet: compliant with bariatric meal plan for the most part  Exercise: regular exercise, bootcamp twice weekly   Vitamins: not as compliant with bariatric supplementation as she should be      Labs (): completing today   Labs (12/5/24): iron: 46, iron sat: 19%, b1 pending.      Ht: 67in  Weights:   Trend Weights BMI   Starting 281# 43   Pre-Op 276# 43   1 mth 241#  37   2 Month  229# 35   6 Month  200# 31     1 Year       2 Year                  For Adults 20 Years and Older:  BMI Weight Status   Below 18.5 Underweight   18.6-24.9 Normal/Healthy   25.0-29.9 Overweight   30.0 & Above Obese      Ideal Weight Range for Your Height: 5'7" = 121 - 158 lbs                            Pertinent History:  HTN - [] Yes   [] No    [x] Resolved  HLD - [] Yes   [x] No    [] Resolved  DM  -  [] Yes   [x] No    [] Resolved  DESTINY - [] Yes   [x] No   [] Resolved  GERD - [] Yes   [] No [x] Resolved  Anticoagulation- [] Yes   [x] No      If yes, type: [] ASA [] Plavix [] Other    Patient Care Team:  Yue Mccabe FNP as PCP - General (Family Medicine)  Gorge Kolb MD as Surgeon (Bariatrics)  Liane Salas MD as Consulting Physician (Neurology)  David Concepcion MD as Consulting Physician (Endocrinology)  Brandon Ariza MD as Consulting Physician (Neurosurgery)     Subjective:     12 point review of systems conducted, " negative except as stated in the history of present illness. See HPI for details.    Review of patient's allergies indicates:  No Known Allergies  Past Medical History:   Diagnosis Date    Abnormal uterine bleeding     Anemia 2022    Encounter for Papanicolaou smear of cervix 2023    Former cigarette smoker     Hypertension     Idiopathic intracranial hypertension     Migraines     Missed period 2023    Papilledema     Pituitary microadenoma      Past Surgical History:   Procedure Laterality Date     SECTION  03-17-03    x 3    DILATION AND CURETTAGE OF UTERUS N/A 2023    Procedure: DILATION AND CURETTAGE, UTERUS fx;  Surgeon: Jose Canales Jr., MD;  Location: [facility] OR;  Service: OB/GYN;  Laterality: N/A;    ESOPHAGOGASTRODUODENOSCOPY N/A 2024    Procedure: EGD (ESOPHAGOGASTRODUODENOSCOPY);  Surgeon: Gorge Kolb MD;  Location: [facility] OR;  Service: General;  Laterality: N/A;    LAPAROSCOPIC SLEEVE GASTRECTOMY N/A 10/02/2024    Procedure: GASTRECTOMY, SLEEVE, LAPAROSCOPIC;  Surgeon: Gorge Kolb MD;  Location: [facility] OR;  Service: General;  Laterality: N/A;    SLEEVE GASTROPLASTY       Family History   Problem Relation Name Age of Onset    Hypothyroidism Mother      Heart murmur Mother      Heart failure Father      Cancer Father      No Known Problems Daughter      No Known Problems Son      No Known Problems Son      Colon cancer Maternal Grandmother Vargasa gary     Arthritis Maternal Grandmother Vargasa gary     Cancer Maternal Grandmother Barb vega     Breast cancer Other          Mother's sister     Social History[1]   Current Outpatient Medications   Medication Instructions    acetaminophen (TYLENOL) 1,000 mg, Oral, Every 6 hours PRN    acetaZOLAMIDE (DIAMOX) 500 mg, Oral, 2 times daily    cabergoline (DOSTINEX) 0.5 mg tablet Take 1/2 tablet po qhs twice a week    calcium carbonate (TUMS) 200 mg calcium (500 mg) chewable tablet 1 tablet, Daily    EScitalopram  "oxalate (LEXAPRO) 10 mg    ferrous sulfate (FEOSOL) Tab tablet 1 tablet, With breakfast    hyoscyamine (ANASPAZ,LEVSIN) 125 mcg, Oral, Every 4 hours PRN    medroxyPROGESTERone (PROVERA) 10 mg    nystatin (MYCOSTATIN) powder Topical (Top), 2 times daily    pantoprazole (PROTONIX) 40 mg, Oral, Daily, Fill full prescription even if you do not have reflux    pediatric multivit-iron-min (FLINTSTONES COMPLETE, IRON,) Chew Take by mouth.       Objective:     Vital Signs (Most Recent):  Visit Vitals  /85   Pulse 72   Ht 5' 7" (1.702 m)   Wt 90.7 kg (200 lb)   BMI 31.32 kg/m²       Physical Exam:  General:  Alert and oriented.    Respiratory:  Lungs are clear to auscultation, Respirations are non-labored, Breath sounds are equal.    Cardiovascular:  Normal rate, Regular rhythm, No murmur.    Gastrointestinal:  Soft, Non-tender, Non-distended, Normal bowel sounds    Musculoskeletal:  Normal range of motion, Normal strength.    Integumentary:  Warm, Dry, Pink.    Neurologic:  Alert, Oriented.    Psychiatric:  Cooperative.      Assessment:       ICD-10-CM ICD-9-CM   1. S/P gastric sleeve procedure  Z90.3 V45.75   2. Exercise counseling  Z71.82 V65.41   3. Dietary counseling  Z71.3 V65.3   4. Encounter for weight loss counseling  Z71.3 V65.3   5. Obesity, unspecified class, unspecified obesity type, unspecified whether serious comorbidity present  E66.9 278.00   6. Belching  R14.2 787.3   7. Abdominal pannus  E65 278.1   8. Fungal infection of skin of abdomen  B36.9 111.9   9. Constipation, unspecified constipation type  K59.00 564.00   10. Hair loss  L65.9 704.00       Plan:     1. S/P gastric sleeve procedure        2. Exercise counseling        3. Dietary counseling        4. Encounter for weight loss counseling        5. Obesity, unspecified class, unspecified obesity type, unspecified whether serious comorbidity present        6. Belching        7. Abdominal pannus        8. Fungal infection of skin of abdomen      " "  9. Constipation, unspecified constipation type        10. Hair loss              - will complete labs today      - belching   - this is due to air swallowing and your stomach not being able to accommodate air, food, and liquid. Normal for most bariatric patients and is usually behavioral. Try changing up, not using a straw, and eat/drink slowly.   - Abd pannus with occasional fungal infections, would like some powder   - nystain powder sent to patients pharmacy   - Asking if her weight loss is appropriate.    - weight loss is fantastic at this time!  - Mild consitpation at times   - sent pt amazon link to magneisum cirate tablets to take nightly   - Hair loss, asking what she can do    - make sure you are hitting your protein goal   - taking all recommended vitamins and supplements   - ok to take any hair/skin/nail vitamin on market and collegen.       - continue to exercise  - F/U 6 months with bariatric labs  - Continue diet  - Continue vitamin supplementation  - Support group attendance encouraged  - Keep routine appts with PCP/specialists as scheduled                [1]   Social History  Tobacco Use    Smoking status: Former     Current packs/day: 0.00     Average packs/day: 0.5 packs/day for 15.0 years (7.5 ttl pk-yrs)     Types: Cigarettes, Vaping with nicotine     Start date: 2006     Quit date: 2021     Years since quittin.2    Smokeless tobacco: Never    Tobacco comments:     Patient states "I stop smoking since ", stopping vaping since .    Substance Use Topics    Alcohol use: Not Currently     Alcohol/week: 2.0 standard drinks of alcohol     Types: 2 Glasses of wine per week     Comment: occassionally, last drink 2024    Drug use: Never     "

## 2025-04-02 ENCOUNTER — PATIENT MESSAGE (OUTPATIENT)
Dept: SURGERY | Facility: CLINIC | Age: 40
End: 2025-04-02

## 2025-04-02 ENCOUNTER — CLINICAL SUPPORT (OUTPATIENT)
Dept: BARIATRICS | Facility: HOSPITAL | Age: 40
End: 2025-04-02

## 2025-04-02 ENCOUNTER — TELEPHONE (OUTPATIENT)
Dept: BARIATRICS | Facility: HOSPITAL | Age: 40
End: 2025-04-02

## 2025-04-02 ENCOUNTER — LAB VISIT (OUTPATIENT)
Dept: LAB | Facility: HOSPITAL | Age: 40
End: 2025-04-02
Attending: SURGERY

## 2025-04-02 ENCOUNTER — RESULTS FOLLOW-UP (OUTPATIENT)
Dept: SURGERY | Facility: CLINIC | Age: 40
End: 2025-04-02

## 2025-04-02 ENCOUNTER — OFFICE VISIT (OUTPATIENT)
Dept: SURGERY | Facility: CLINIC | Age: 40
End: 2025-04-02
Payer: MEDICAID

## 2025-04-02 ENCOUNTER — PATIENT MESSAGE (OUTPATIENT)
Dept: BARIATRICS | Facility: HOSPITAL | Age: 40
End: 2025-04-02

## 2025-04-02 VITALS
DIASTOLIC BLOOD PRESSURE: 85 MMHG | SYSTOLIC BLOOD PRESSURE: 124 MMHG | WEIGHT: 200 LBS | HEIGHT: 67 IN | HEART RATE: 72 BPM | BODY MASS INDEX: 31.39 KG/M2

## 2025-04-02 DIAGNOSIS — E66.9 OBESITY: Primary | ICD-10-CM

## 2025-04-02 DIAGNOSIS — E66.9 OBESITY, UNSPECIFIED CLASS, UNSPECIFIED OBESITY TYPE, UNSPECIFIED WHETHER SERIOUS COMORBIDITY PRESENT: ICD-10-CM

## 2025-04-02 DIAGNOSIS — Z71.3 DIETARY COUNSELING: ICD-10-CM

## 2025-04-02 DIAGNOSIS — B36.9 FUNGAL INFECTION OF SKIN OF ABDOMEN: ICD-10-CM

## 2025-04-02 DIAGNOSIS — R14.2 BELCHING: ICD-10-CM

## 2025-04-02 DIAGNOSIS — E65 ABDOMINAL PANNUS: ICD-10-CM

## 2025-04-02 DIAGNOSIS — Z91.89 ELECTROLYTE IMBALANCE RISK: ICD-10-CM

## 2025-04-02 DIAGNOSIS — Z90.3 S/P GASTRIC SLEEVE PROCEDURE: Primary | ICD-10-CM

## 2025-04-02 DIAGNOSIS — Z71.82 EXERCISE COUNSELING: ICD-10-CM

## 2025-04-02 DIAGNOSIS — Z13.0 SCREENING, ANEMIA, DEFICIENCY, IRON: ICD-10-CM

## 2025-04-02 DIAGNOSIS — Z71.3 ENCOUNTER FOR WEIGHT LOSS COUNSELING: ICD-10-CM

## 2025-04-02 DIAGNOSIS — Z98.84 HX OF BARIATRIC SURGERY: Primary | ICD-10-CM

## 2025-04-02 DIAGNOSIS — L65.9 HAIR LOSS: ICD-10-CM

## 2025-04-02 DIAGNOSIS — Z13.21 ENCOUNTER FOR VITAMIN DEFICIENCY SCREENING: ICD-10-CM

## 2025-04-02 DIAGNOSIS — K59.00 CONSTIPATION, UNSPECIFIED CONSTIPATION TYPE: ICD-10-CM

## 2025-04-02 LAB
ALBUMIN SERPL-MCNC: 3.8 G/DL (ref 3.5–5)
ALBUMIN/GLOB SERPL: 1.3 RATIO (ref 1.1–2)
ALP SERPL-CCNC: 62 UNIT/L (ref 40–150)
ALT SERPL-CCNC: 10 UNIT/L (ref 0–55)
ANION GAP SERPL CALC-SCNC: 4 MEQ/L
AST SERPL-CCNC: 15 UNIT/L (ref 11–45)
BASOPHILS # BLD AUTO: 0.02 X10(3)/MCL
BASOPHILS NFR BLD AUTO: 0.5 %
BILIRUB SERPL-MCNC: 0.3 MG/DL
BUN SERPL-MCNC: 7 MG/DL (ref 7–18.7)
CALCIUM SERPL-MCNC: 9 MG/DL (ref 8.4–10.2)
CHLORIDE SERPL-SCNC: 107 MMOL/L (ref 98–107)
CO2 SERPL-SCNC: 30 MMOL/L (ref 22–29)
CREAT SERPL-MCNC: 0.63 MG/DL (ref 0.55–1.02)
CREAT/UREA NIT SERPL: 11
EOSINOPHIL # BLD AUTO: 0.06 X10(3)/MCL (ref 0–0.9)
EOSINOPHIL NFR BLD AUTO: 1.6 %
ERYTHROCYTE [DISTWIDTH] IN BLOOD BY AUTOMATED COUNT: 12.5 % (ref 11.5–17)
GFR SERPLBLD CREATININE-BSD FMLA CKD-EPI: >60 ML/MIN/1.73/M2
GLOBULIN SER-MCNC: 2.9 GM/DL (ref 2.4–3.5)
GLUCOSE SERPL-MCNC: 78 MG/DL (ref 74–100)
HCT VFR BLD AUTO: 36.4 % (ref 37–47)
HGB BLD-MCNC: 11.2 G/DL (ref 12–16)
IMM GRANULOCYTES # BLD AUTO: 0.01 X10(3)/MCL (ref 0–0.04)
IMM GRANULOCYTES NFR BLD AUTO: 0.3 %
IRON SATN MFR SERPL: 10 % (ref 20–50)
IRON SERPL-MCNC: 30 UG/DL (ref 50–170)
LYMPHOCYTES # BLD AUTO: 1.15 X10(3)/MCL (ref 0.6–4.6)
LYMPHOCYTES NFR BLD AUTO: 30.7 %
MCH RBC QN AUTO: 27.3 PG (ref 27–31)
MCHC RBC AUTO-ENTMCNC: 30.8 G/DL (ref 33–36)
MCV RBC AUTO: 88.6 FL (ref 80–94)
MONOCYTES # BLD AUTO: 0.34 X10(3)/MCL (ref 0.1–1.3)
MONOCYTES NFR BLD AUTO: 9.1 %
NEUTROPHILS # BLD AUTO: 2.17 X10(3)/MCL (ref 2.1–9.2)
NEUTROPHILS NFR BLD AUTO: 57.8 %
NRBC BLD AUTO-RTO: 0 %
PLATELET # BLD AUTO: 271 X10(3)/MCL (ref 130–400)
PMV BLD AUTO: 9.5 FL (ref 7.4–10.4)
POTASSIUM SERPL-SCNC: 4.5 MMOL/L (ref 3.5–5.1)
PROT SERPL-MCNC: 6.7 GM/DL (ref 6.4–8.3)
RBC # BLD AUTO: 4.11 X10(6)/MCL (ref 4.2–5.4)
SODIUM SERPL-SCNC: 141 MMOL/L (ref 136–145)
TIBC SERPL-MCNC: 258 UG/DL (ref 70–310)
TIBC SERPL-MCNC: 288 UG/DL (ref 250–450)
TRANSFERRIN SERPL-MCNC: 250 MG/DL (ref 180–382)
VIT B12 SERPL-MCNC: 328 PG/ML (ref 213–816)
WBC # BLD AUTO: 3.75 X10(3)/MCL (ref 4.5–11.5)

## 2025-04-02 PROCEDURE — 82607 VITAMIN B-12: CPT

## 2025-04-02 PROCEDURE — 85025 COMPLETE CBC W/AUTO DIFF WBC: CPT

## 2025-04-02 PROCEDURE — 84425 ASSAY OF VITAMIN B-1: CPT

## 2025-04-02 PROCEDURE — 83550 IRON BINDING TEST: CPT

## 2025-04-02 PROCEDURE — 99215 OFFICE O/P EST HI 40 MIN: CPT | Mod: ,,, | Performed by: NURSE PRACTITIONER

## 2025-04-02 PROCEDURE — 1159F MED LIST DOCD IN RCRD: CPT | Mod: CPTII,,, | Performed by: NURSE PRACTITIONER

## 2025-04-02 PROCEDURE — 3074F SYST BP LT 130 MM HG: CPT | Mod: CPTII,,, | Performed by: NURSE PRACTITIONER

## 2025-04-02 PROCEDURE — 80053 COMPREHEN METABOLIC PANEL: CPT

## 2025-04-02 PROCEDURE — 3008F BODY MASS INDEX DOCD: CPT | Mod: CPTII,,, | Performed by: NURSE PRACTITIONER

## 2025-04-02 PROCEDURE — 36415 COLL VENOUS BLD VENIPUNCTURE: CPT

## 2025-04-02 PROCEDURE — 3079F DIAST BP 80-89 MM HG: CPT | Mod: CPTII,,, | Performed by: NURSE PRACTITIONER

## 2025-04-02 RX ORDER — NYSTATIN 100000 [USP'U]/G
POWDER TOPICAL 2 TIMES DAILY
Qty: 60 G | Refills: 3 | Status: SHIPPED | OUTPATIENT
Start: 2025-04-02

## 2025-04-02 NOTE — TELEPHONE ENCOUNTER
----- Message from CAIN Sykes sent at 4/2/2025  1:01 PM CDT -----  6mth s/p vsg, not compliant on vitamin supplementation    Anemic, lower b12, and low iron    Ok to tell her to take an extra iron with her sofy MVI at this time until we repeat it in 3 months     Girls,   3mth repeat iron panel, cbc, b12, and ferritin   ----- Message -----  From: Lab, Background User  Sent: 4/2/2025  10:57 AM CDT  To: CAIN Miller

## 2025-04-02 NOTE — TELEPHONE ENCOUNTER
Called pt to inform her how to take vitamins appropriately and notified her that labs were low. Pt stated she thought she was taking bariatric MVI but she was only taking one Calcium vitamin daily.     Rec'd taking a multivitamin and iron supplement (sending to her chart) that she can get at drug store until her one's that FRANCES Arias sent her come in on [employer]. Said she is ordering vitamins on [employer] today.

## 2025-04-02 NOTE — PROGRESS NOTES
"POST OP BARIATRIC NUTRITION FOLLOW UP    Type of surgery: sleeve gastrectomy  Post-op visit:   [] 2 weeks  [] 4 weeks:  [] 2 months:  [] 4 months:  [x] 6 months:  [] 9 months:  [] 1 year:   [] Other:     Height:   Ht Readings from Last 1 Encounters:   04/02/25 5' 7" (1.702 m)      Weight:   Wt Readings from Last 3 Encounters:   04/02/25 0841 90.7 kg (200 lb)   12/06/24 0911 103.9 kg (229 lb)   12/06/24 0854 103.1 kg (227 lb 4.8 oz)      BMI:   BMI Readings from Last 1 Encounters:   04/02/25 31.32 kg/m²       Post op complications:   [] Yes [x] No  If yes: [] Nausea   [] Vomiting   [] Constipation    [] Diarrhea    [] Other:     Dietary tolerance:  [x] Yes [] No [] Comment:     Adequate fluid intake:  [x] Yes [] No Approx. daily fluid intake: 70 oz  Adequate protein intake:  [] Yes [x] No  Approx. daily protein intake: 40 g    Vitamins/Minerals:   [] MVI:    [] Biotin:  [] Calcium:    [] Hair/Nails:  [] B 50 complex:   [x] Bariatric Specific MVI:  [] B12:    [] Bariatric Specific Calcium:  [] Iron:    [] Other:   [x] Non-compliance:  Calcium    Labs:   no recent labs- pt is getting them done today  Comment: rec'd to add calcium supplement to diet    Expected compliance:  []Good  [x]Fair  []Poor    Progress:   [x]Patient progressing well at this time with no complaints   [] Patient expressed complaint at this time: See additional notes     Bariatric Diet Education:   Verbalizes understanding Demonstrates  Needs further teaching Needs practice/ supervision Comments    Bariatric Clear [] [] [] []    Bariatric Full [] [] [] []    Bariatric Puree [] [] [] []    Bariatric Soft [] [] [] []    Bariatric Regular [] [] [] []    Bariatric Reintroduction of Starchy CHO [x] [] [] []    Bariatric: Mindful Eating [x] [] [] []    Importance of Protein and Vitamin Protocol [x] [] [] []    Other:          Additional Notes:   Pt meeting water goals daily- said occasionally nauseous but believe due to undereating. Advised increasing " protein to 80-90 grams daily, especially since she started boot camp with more intense workouts. Rec'd core power shake with 42 grams protein. Pt takes Bariatric MVI. Rec'd adding Calcium supplement (650 mg 3x daily) and showed her where to find online. Educated pt on stage 6 lifelong bariatric diet. Advised limiting starches to one serving daily. Pt getting labs done today- no recent labs. Will speak to pt if labs not WNL to adjust vitamin regimen.

## 2025-04-02 NOTE — PROGRESS NOTES
A 6 month F/U was conducted with Ida.  Current weight is 201.1 lbs.  Patient has lost 76  lbs. Since her preop class. A body composition was conducted and patient was educated on results. Patient's goal weight is 176 lbs.  Patient is exercising by doing bootcamp 2x/week and running/walking on other days. She is having cravings for sweets. She reports that she eats fruit. No issues or concerns at this time.       PLAN:    - Patient will continue with current exercise regimen.  - Patient will follow dietary advice from LASHAWN Durand RD.      It is my professional opinion that patient is in the action phase of behavior change.      FORTINO Zavaleta, CPT, CHC

## 2025-04-05 LAB — VIT B1 BLD-SCNC: 57 NMOL/L (ref 70–180)

## 2025-04-07 RX ORDER — LANOLIN ALCOHOL/MO/W.PET/CERES
100 CREAM (GRAM) TOPICAL 2 TIMES DAILY
Qty: 180 TABLET | Refills: 0 | Status: SHIPPED | OUTPATIENT
Start: 2025-04-07 | End: 2025-07-06

## 2025-04-07 NOTE — PROGRESS NOTES
Patient ID: [id number]     Chief Complaint: needs injection      HPI:     Ida Saxena is a 40 y.o. female with hx of VSG. Lab work continues to reveal thiamine and B12 deficiency. Denies any symptoms or issues at this time.     ROS:  General: alert  cardio: no palpitations, chest pain  neuro: oriented x's 4, no numbness, paresthesia, forgetfulness or headaches.   All Other ROS: Negative except as stated in HPI.     PE:   Neuro: steady gait, appropriate sentences. Tolerated injection well. Band-Aid applied to injection sites.     Assessment:       ICD-10-CM ICD-9-CM   1. Thiamine deficiency  E51.9 265.1   2. S/P gastric sleeve procedure  Z90.3 V45.75        Lab Frequency Next Occurrence   Mammo Digital Screening Bilat w/ Shawn Once 12/20/2023   US Pelvis Comp with Transvag NON-OB (xpd Once 04/26/2024        Plan:     1. Thiamine deficiency    2. S/P gastric sleeve procedure      - Thiamine and B12 injection given in clinic today.   - labs in 3mths to repeat b1 and b12 levels.   - continue all bariatric multivitamins/calcium and continue B50 and B12 supplement to one tab BID with multivitamin   - f/u with normal bariatric schedule or as needed.

## 2025-04-07 NOTE — PROGRESS NOTES
Girls,     6mth s/p VSG, low thiamine  1mth repeat thiamine  Call pt to schedule thiamine injection in office this week

## 2025-04-08 ENCOUNTER — PATIENT MESSAGE (OUTPATIENT)
Dept: SURGERY | Facility: CLINIC | Age: 40
End: 2025-04-08

## 2025-04-08 ENCOUNTER — OFFICE VISIT (OUTPATIENT)
Dept: SURGERY | Facility: CLINIC | Age: 40
End: 2025-04-08
Payer: MEDICAID

## 2025-04-08 VITALS
HEART RATE: 88 BPM | WEIGHT: 197 LBS | HEIGHT: 67 IN | DIASTOLIC BLOOD PRESSURE: 87 MMHG | SYSTOLIC BLOOD PRESSURE: 123 MMHG | BODY MASS INDEX: 30.92 KG/M2

## 2025-04-08 DIAGNOSIS — Z90.3 S/P GASTRIC SLEEVE PROCEDURE: ICD-10-CM

## 2025-04-08 DIAGNOSIS — E51.9 THIAMINE DEFICIENCY: Primary | ICD-10-CM

## 2025-04-08 PROCEDURE — 3008F BODY MASS INDEX DOCD: CPT | Mod: CPTII,,, | Performed by: NURSE PRACTITIONER

## 2025-04-08 PROCEDURE — 1159F MED LIST DOCD IN RCRD: CPT | Mod: CPTII,,, | Performed by: NURSE PRACTITIONER

## 2025-04-08 PROCEDURE — 99213 OFFICE O/P EST LOW 20 MIN: CPT | Mod: ,,, | Performed by: NURSE PRACTITIONER

## 2025-04-08 PROCEDURE — 3079F DIAST BP 80-89 MM HG: CPT | Mod: CPTII,,, | Performed by: NURSE PRACTITIONER

## 2025-04-08 PROCEDURE — 3074F SYST BP LT 130 MM HG: CPT | Mod: CPTII,,, | Performed by: NURSE PRACTITIONER

## 2025-04-08 RX ORDER — THIAMINE HYDROCHLORIDE 100 MG/ML
200 INJECTION, SOLUTION INTRAMUSCULAR; INTRAVENOUS ONCE
Status: COMPLETED | OUTPATIENT
Start: 2025-04-08 | End: 2025-04-08

## 2025-04-08 RX ADMIN — THIAMINE HYDROCHLORIDE 200 MG: 100 INJECTION, SOLUTION INTRAMUSCULAR; INTRAVENOUS at 02:04

## 2025-05-07 ENCOUNTER — TELEPHONE (OUTPATIENT)
Dept: SURGERY | Facility: CLINIC | Age: 40
End: 2025-05-07
Payer: MEDICAID

## 2025-05-07 ENCOUNTER — PATIENT MESSAGE (OUTPATIENT)
Dept: SURGERY | Facility: CLINIC | Age: 40
End: 2025-05-07
Payer: MEDICAID

## 2025-05-07 NOTE — TELEPHONE ENCOUNTER
----- Message from Jarrod Bansal sent at 4/7/2025 11:11 AM CDT -----  Regarding: Repeat B1  1mth repeat thiamine

## 2025-05-14 ENCOUNTER — LAB VISIT (OUTPATIENT)
Dept: LAB | Facility: HOSPITAL | Age: 40
End: 2025-05-14
Attending: SURGERY
Payer: MEDICAID

## 2025-05-14 DIAGNOSIS — Z13.21 ENCOUNTER FOR VITAMIN DEFICIENCY SCREENING: ICD-10-CM

## 2025-05-14 PROCEDURE — 84425 ASSAY OF VITAMIN B-1: CPT

## 2025-05-14 PROCEDURE — 36415 COLL VENOUS BLD VENIPUNCTURE: CPT

## 2025-05-16 LAB — VIT B1 BLD-SCNC: 91 NMOL/L (ref 70–180)

## 2025-06-18 ENCOUNTER — LAB VISIT (OUTPATIENT)
Dept: LAB | Facility: HOSPITAL | Age: 40
End: 2025-06-18
Payer: MEDICAID

## 2025-06-18 ENCOUNTER — OFFICE VISIT (OUTPATIENT)
Dept: GYNECOLOGY | Facility: CLINIC | Age: 40
End: 2025-06-18
Payer: MEDICAID

## 2025-06-18 VITALS
OXYGEN SATURATION: 100 % | HEART RATE: 83 BPM | BODY MASS INDEX: 30.26 KG/M2 | HEIGHT: 67 IN | TEMPERATURE: 99 F | WEIGHT: 192.81 LBS | DIASTOLIC BLOOD PRESSURE: 86 MMHG | RESPIRATION RATE: 18 BRPM | SYSTOLIC BLOOD PRESSURE: 114 MMHG

## 2025-06-18 DIAGNOSIS — Z12.31 ENCOUNTER FOR SCREENING MAMMOGRAM FOR BREAST CANCER: ICD-10-CM

## 2025-06-18 DIAGNOSIS — Z11.3 SCREENING FOR STD (SEXUALLY TRANSMITTED DISEASE): ICD-10-CM

## 2025-06-18 DIAGNOSIS — Z01.419 WELL WOMAN EXAM WITH ROUTINE GYNECOLOGICAL EXAM: Primary | ICD-10-CM

## 2025-06-18 DIAGNOSIS — N93.9 ABNORMAL UTERINE BLEEDING: ICD-10-CM

## 2025-06-18 LAB
B-HCG UR QL: NEGATIVE
C TRACH DNA SPEC QL NAA+PROBE: NOT DETECTED
CLUE CELLS VAG QL WET PREP: ABNORMAL
CTP QC/QA: YES
HBV SURFACE AG SERPL QL IA: NONREACTIVE
HCV AB SERPL QL IA: NONREACTIVE
HIV 1+2 AB+HIV1 P24 AG SERPL QL IA: NONREACTIVE
N GONORRHOEA DNA SPEC QL NAA+PROBE: NOT DETECTED
SPECIMEN SOURCE: NORMAL
T PALLIDUM AB SER QL: NONREACTIVE
T VAGINALIS VAG QL WET PREP: ABNORMAL
WBC #/AREA VAG WET PREP: ABNORMAL
YEAST SPEC QL WET PREP: ABNORMAL

## 2025-06-18 PROCEDURE — 86780 TREPONEMA PALLIDUM: CPT

## 2025-06-18 PROCEDURE — 87491 CHLMYD TRACH DNA AMP PROBE: CPT

## 2025-06-18 PROCEDURE — 87389 HIV-1 AG W/HIV-1&-2 AB AG IA: CPT

## 2025-06-18 PROCEDURE — 87210 SMEAR WET MOUNT SALINE/INK: CPT

## 2025-06-18 PROCEDURE — 3079F DIAST BP 80-89 MM HG: CPT | Mod: CPTII,,,

## 2025-06-18 PROCEDURE — 81025 URINE PREGNANCY TEST: CPT | Mod: PBBFAC

## 2025-06-18 PROCEDURE — 3074F SYST BP LT 130 MM HG: CPT | Mod: CPTII,,,

## 2025-06-18 PROCEDURE — 1159F MED LIST DOCD IN RCRD: CPT | Mod: CPTII,,,

## 2025-06-18 PROCEDURE — 87340 HEPATITIS B SURFACE AG IA: CPT

## 2025-06-18 PROCEDURE — 99214 OFFICE O/P EST MOD 30 MIN: CPT | Mod: PBBFAC

## 2025-06-18 PROCEDURE — 99396 PREV VISIT EST AGE 40-64: CPT | Mod: S$PBB,,,

## 2025-06-18 PROCEDURE — 3008F BODY MASS INDEX DOCD: CPT | Mod: CPTII,,,

## 2025-06-18 PROCEDURE — 36415 COLL VENOUS BLD VENIPUNCTURE: CPT

## 2025-06-18 PROCEDURE — 86803 HEPATITIS C AB TEST: CPT

## 2025-06-18 RX ORDER — IBUPROFEN 800 MG/1
800 TABLET, FILM COATED ORAL 2 TIMES DAILY PRN
COMMUNITY
Start: 2025-04-07

## 2025-06-18 RX ORDER — ACETAZOLAMIDE 500 MG/1
500 CAPSULE, EXTENDED RELEASE ORAL 2 TIMES DAILY
COMMUNITY
Start: 2024-10-08

## 2025-06-18 NOTE — PROGRESS NOTES
[facility] -  Gynecology / Women's Health Clinic     Subjective:      Patient ID: Ida Saxena is a 40 y.o. female.    Chief Complaint:  Well Woman      History of Present Illness:  The patient  here for annual exam. Her LMP was 25. Period last 4 days and changes pads 4-5x/day on heaviest 3 days, uses 3 thick pads at once. Admitted cycles regular and monthly. Declines medical management at this time, completed bariatric surgery 10/2024, followed by Bariatric clinic/Gen surg. Denies history of abnormal paps. Last pap 3/28/23 -NIL and HPV neg. MG 25 with rei breast uls & BIRADS 2. Denies breast or urinary complaints. Denies pelvic pain or discharge. Pt reports no STIs in the past and desires testing. Declines contraception. Denies tobacco use. Dep. screening 0. Denies fly hx of ovarian or uterine cancer. Maternal aunt and paternal aunts x4 with breast cancer. Mother with colon cancer. Pt followed by IM, Neurology, Neurosurgery.  Pt with hx of elevated prolactin level, seeing Neurosurgery at [facility] in [place]. Per Neurosurgery note 2023, MRI findings of possible small Lt pituitary microadenoma 3-4mm in size, surveillance per note 2025 no change in pituitary macroadenoma, no treatment currently. Pt denies Galactorrhea today.    GYN & OB History:  Patient's last menstrual period was 2025 (exact date).   Date of Last Pap: 4/3/2023    OB History    Para Term  AB Living   3 3 3      SAB IAB Ectopic Multiple Live Births             # Outcome Date GA Lbr Matthew/2nd Weight Sex Type Anes PTL Lv   3 Term      CS-LTranv      2 Term      CS-LTranv      1 Term      CS-LTranv          Past Medical History:   Diagnosis Date    Abnormal uterine bleeding     Anemia 2022    Encounter for Papanicolaou smear of cervix 2023    Former cigarette smoker     Hypertension     Idiopathic intracranial hypertension     Migraines     Missed period 2023    Papilledema  "    Pituitary microadenoma         Past Surgical History:   Procedure Laterality Date     SECTION  03-17-03    x 3    DILATION AND CURETTAGE OF UTERUS N/A 2023    Procedure: DILATION AND CURETTAGE, UTERUS fx;  Surgeon: Jose Canales Jr., MD;  Location: [facility] OR;  Service: OB/GYN;  Laterality: N/A;    ESOPHAGOGASTRODUODENOSCOPY N/A 2024    Procedure: EGD (ESOPHAGOGASTRODUODENOSCOPY);  Surgeon: Gorge Kolb MD;  Location: [facility] OR;  Service: General;  Laterality: N/A;    LAPAROSCOPIC SLEEVE GASTRECTOMY N/A 10/02/2024    Procedure: GASTRECTOMY, SLEEVE, LAPAROSCOPIC;  Surgeon: Gorge Kolb MD;  Location: [facility] OR;  Service: General;  Laterality: N/A;    SLEEVE GASTROPLASTY          Social History     Tobacco Use    Smoking status: Former     Current packs/day: 0.00     Average packs/day: 0.5 packs/day for 15.0 years (7.5 ttl pk-yrs)     Types: Cigarettes, Vaping with nicotine     Start date: 2006     Quit date: 2021     Years since quittin.4     Passive exposure: Past    Smokeless tobacco: Never    Tobacco comments:     Patient states "I stop smoking since ", stopping vaping since .    Substance and Sexual Activity    Alcohol use: Yes     Alcohol/week: 2.0 standard drinks of alcohol     Types: 2 Glasses of wine per week     Comment: occassionally    Drug use: Never    Sexual activity: Yes     Partners: Male     Birth control/protection: None        Current Outpatient Medications   Medication Instructions    acetaminophen (TYLENOL) 1,000 mg, Oral, Every 6 hours PRN    acetaZOLAMIDE (DIAMOX) 500 mg, 2 times daily    cabergoline (DOSTINEX) 0.5 mg tablet Take 1/2 tablet po qhs twice a week    calcium carbonate (TUMS) 200 mg calcium (500 mg) chewable tablet 1 tablet, Daily    EScitalopram oxalate (LEXAPRO) 10 mg    ferrous sulfate (FEOSOL) Tab tablet 1 tablet, With breakfast    hyoscyamine (ANASPAZ,LEVSIN) 125 mcg, Oral, Every 4 hours PRN    ibuprofen (ADVIL,MOTRIN) 800 mg, 2 " "times daily PRN    medroxyPROGESTERone (PROVERA) 10 mg    nystatin (MYCOSTATIN) powder Topical (Top), 2 times daily    pantoprazole (PROTONIX) 40 mg, Oral, Daily, Fill full prescription even if you do not have reflux    pediatric multivit-iron-min (FLINTSTONES COMPLETE, IRON,) Chew Take by mouth.    thiamine 100 mg, Oral, 2 times daily       Review of patient's allergies indicates:  No Known Allergies      Review of Systems:  Review of Systems  Negative except for pertinent findings for positives per HPI.     Objective:     Physical Exam   Visit Vitals  /86 (BP Location: Right arm, Patient Position: Sitting)   Pulse 83   Temp 98.6 °F (37 °C) (Oral)   Resp 18   Ht 5' 7" (1.702 m)   Wt 87.5 kg (192 lb 12.8 oz)   LMP 06/08/2025 (Exact Date)   SpO2 100%   BMI 30.20 kg/m²       GENERAL: Well-developed female. No acute distress.    SKIN: Normal to inspection, warm and intact.  BREASTS: No rashes or erythema. No masses, lumps, discharge, tenderness.  VULVA: General appearance normal; external genitalia with no lesions or erythema.  VAGINA: Mucosa/vaginal vault pink, no lesions. Thin white discharge noted.  CERVIX: Pink, parous appearing os, no erythema or abnormal discharge.  BIMANUAL EXAM: reveals a 10 week-sized uterus. The uterus is non tender. Bilateral adnexa reveal no tenderness.  PSYCHIATRIC: Patient is oriented to person, place, and time. Mood and affect are normal.    Assessment:       ICD-10-CM ICD-9-CM   1. Well woman exam with routine gynecological exam  Z01.419 V72.31   2. Encounter for screening mammogram for breast cancer  Z12.31 V76.12   3. Abnormal uterine bleeding  N93.9 626.9   4. Screening for STD (sexually transmitted disease)  Z11.3 V74.5       Plan:     1. Well woman exam with routine gynecological exam    2. Encounter for screening mammogram for breast cancer  -     Mammo Digital Screening Bilat w/ Shawn (XPD); Future; Expected date: 04/30/2026    3. Abnormal uterine bleeding  -     POCT urine " pregnancy  -     US Pelvis Comp with Transvag NON-OB (xpd; Future; Expected date: 06/18/2025    4. Screening for STD (sexually transmitted disease)  -     Chlamydia/GC, PCR  -     Wet Prep, Genital  -     HIV 1/2 Ag/Ab (4th Gen); Future; Expected date: 06/18/2025  -     Hepatitis C Antibody; Future; Expected date: 06/18/2025  -     Hepatitis B Surface Antigen; Future; Expected date: 06/18/2025  -     SYPHILIS ANTIBODY (WITH REFLEX RPR); Future; Expected date: 06/18/2025    Pelvic exam today  MG ordered  STD testing   UPT - neg    Discussed options for medical management of AUB with pt. Depo, Provera and Mirena IUD. Depo-pt informed that will likely control bleeding either with amenorrhea or lighten cycles, potential weight gain. Mirena IUD also likely best option for medical management. Limited systemic absorption, possible amenorrhea or lighter cycles and coverage for 5 years. Provera-pt informed that will likely control bleeding either with amenorrhea or lighten cycles. Discussed with pt that Provera does not provide contraception, continue condom use to prevent pregnancy, Provera is a hormone, still risk of blood clots, MI and CVA, pt states she understands risk and wishes to proceed.  Patient declined medical management today, states cycles are manageable, requesting TVUS ordered    Call with any GYN concerns    Follow up in about 1 year (around 6/18/2026) for Annual.

## 2025-06-27 ENCOUNTER — TELEPHONE (OUTPATIENT)
Dept: SURGERY | Facility: CLINIC | Age: 40
End: 2025-06-27
Payer: MEDICAID

## 2025-06-27 NOTE — TELEPHONE ENCOUNTER
----- Message from Med Assistant Mejia sent at 4/3/2025  8:13 AM CDT -----  Regarding: repeat labs  3mth repeat iron panel, cbc, b12, and ferritin

## 2025-07-02 ENCOUNTER — OFFICE VISIT (OUTPATIENT)
Dept: ENDOCRINOLOGY | Facility: CLINIC | Age: 40
End: 2025-07-02
Payer: MEDICAID

## 2025-07-02 ENCOUNTER — LAB VISIT (OUTPATIENT)
Dept: LAB | Facility: HOSPITAL | Age: 40
End: 2025-07-02
Attending: INTERNAL MEDICINE
Payer: MEDICAID

## 2025-07-02 VITALS
HEART RATE: 70 BPM | RESPIRATION RATE: 10 BRPM | HEIGHT: 67 IN | BODY MASS INDEX: 29.98 KG/M2 | DIASTOLIC BLOOD PRESSURE: 78 MMHG | SYSTOLIC BLOOD PRESSURE: 115 MMHG | WEIGHT: 191 LBS | TEMPERATURE: 99 F

## 2025-07-02 DIAGNOSIS — E55.9 VITAMIN D DEFICIENCY: Primary | ICD-10-CM

## 2025-07-02 DIAGNOSIS — E55.9 VITAMIN D DEFICIENCY: ICD-10-CM

## 2025-07-02 DIAGNOSIS — D35.2 MICROPROLACTINOMA: Primary | ICD-10-CM

## 2025-07-02 DIAGNOSIS — D35.2 MICROPROLACTINOMA: ICD-10-CM

## 2025-07-02 LAB
25(OH)D3+25(OH)D2 SERPL-MCNC: 26 NG/ML (ref 30–80)
ANION GAP SERPL CALC-SCNC: 6 MEQ/L
BUN SERPL-MCNC: 9.5 MG/DL (ref 7–18.7)
CALCIUM SERPL-MCNC: 9.1 MG/DL (ref 8.4–10.2)
CHLORIDE SERPL-SCNC: 109 MMOL/L (ref 98–107)
CO2 SERPL-SCNC: 27 MMOL/L (ref 22–29)
CREAT SERPL-MCNC: 0.71 MG/DL (ref 0.55–1.02)
CREAT/UREA NIT SERPL: 13
GFR SERPLBLD CREATININE-BSD FMLA CKD-EPI: >60 ML/MIN/1.73/M2
GLUCOSE SERPL-MCNC: 87 MG/DL (ref 74–100)
POTASSIUM SERPL-SCNC: 4.1 MMOL/L (ref 3.5–5.1)
PROLACTIN LEVEL (OLG): 30.89 NG/ML (ref 5.18–26.53)
SODIUM SERPL-SCNC: 142 MMOL/L (ref 136–145)

## 2025-07-02 PROCEDURE — 36415 COLL VENOUS BLD VENIPUNCTURE: CPT

## 2025-07-02 PROCEDURE — 80048 BASIC METABOLIC PNL TOTAL CA: CPT

## 2025-07-02 PROCEDURE — 99213 OFFICE O/P EST LOW 20 MIN: CPT | Mod: PBBFAC | Performed by: INTERNAL MEDICINE

## 2025-07-02 PROCEDURE — 82306 VITAMIN D 25 HYDROXY: CPT

## 2025-07-02 PROCEDURE — 84146 ASSAY OF PROLACTIN: CPT

## 2025-07-02 NOTE — TELEPHONE ENCOUNTER
Labs are back.  Vit d is low and prl is up again.    Erx ergocalciferol 50,000 IU po twice a week, #12, no refills.    Erx cabergoline 0.5mg, 1/2 tab po twice a week, #12, 3 refills.    Prl, vit d prior to f/u.

## 2025-07-02 NOTE — PROGRESS NOTES
Endocrine  CLINIC NOTE    Patient Name: Ida Saxena  YOB: 1985    PRESENTING HISTORY       History of Present Illness:  Ms. Ida Saxena is a 40 y.o. female w/ an active medical problem list including Vitamin D deficiency and pituitary microadenoma (03/2023) with hyperprolactinemia.  She reports since prior visit G underwent gastric sleeve procedure on 10/2/24.  Since the procedure she was uncertain of education she should continue and had discontinued taking cabergoline.  Strips over the last 3 months she has began to experience thick white discharge from her right breast that is blood-tinged intermittently.  Has had a normal mammogram on 4/2025.  She reports persistent fatigue and improvement in appetite following gastric sleeve.  Prolactin level 23.57, within normal limits on 12/2024.  Last vitamin-D 28.9 on 08/2023.        Review of Systems   Constitutional:  Positive for malaise/fatigue. Negative for chills, fever and weight loss.   Eyes:  Negative for blurred vision and double vision.   All other systems reviewed and are negative.      CURRENT MEDICATIONS      Current Outpatient Medications on File Prior to Visit   Medication Sig    acetaminophen (TYLENOL) 500 MG tablet Take 2 tablets (1,000 mg total) by mouth every 6 (six) hours as needed for Pain.    acetaZOLAMIDE (DIAMOX) 500 mg CpSR Take 500 mg by mouth 2 (two) times daily.    calcium carbonate (TUMS) 200 mg calcium (500 mg) chewable tablet Take 1 tablet by mouth once daily.    ibuprofen (ADVIL,MOTRIN) 800 MG tablet Take 800 mg by mouth 2 (two) times daily as needed.    pediatric multivit-iron-min (FLINTSTONES COMPLETE, IRON,) Chew Take by mouth.    [DISCONTINUED] cabergoline (DOSTINEX) 0.5 mg tablet Take 1/2 tablet po qhs twice a week (Patient not taking: Reported on 7/2/2025)    [DISCONTINUED] EScitalopram oxalate (LEXAPRO) 10 MG tablet Take 10 mg by mouth. (Patient not taking: Reported on 7/2/2025)    [DISCONTINUED]  ferrous sulfate (FEOSOL) Tab tablet Take 1 tablet by mouth daily with breakfast. (Patient not taking: Reported on 2025)    [DISCONTINUED] hyoscyamine (ANASPAZ,LEVSIN) 0.125 mg Tab Take 1 tablet (125 mcg total) by mouth every 4 (four) hours as needed (for abdominal spasms). (Patient not taking: Reported on 2025)    [DISCONTINUED] medroxyPROGESTERone (PROVERA) 10 MG tablet Take 10 mg by mouth. (Patient not taking: Reported on 2025)    [DISCONTINUED] nystatin (MYCOSTATIN) powder Apply topically 2 (two) times daily. (Patient not taking: Reported on 2025)    [DISCONTINUED] pantoprazole (PROTONIX) 40 MG tablet Take 1 tablet (40 mg total) by mouth once daily. Fill full prescription even if you do not have reflux (Patient not taking: Reported on 2025)    [DISCONTINUED] thiamine 100 MG tablet Take 1 tablet (100 mg total) by mouth 2 (two) times a day. (Patient not taking: Reported on 2025)     No current facility-administered medications on file prior to visit.           PAST HISTORY:     Past Medical History:   Diagnosis Date    Abnormal uterine bleeding     Anemia 2022    Encounter for Papanicolaou smear of cervix 2023    Former cigarette smoker     Hypertension     Idiopathic intracranial hypertension     Migraines     Missed period 2023    Papilledema     Pituitary microadenoma        Past Surgical History:   Procedure Laterality Date     SECTION  03-17-03    x 3    DILATION AND CURETTAGE OF UTERUS N/A 2023    Procedure: DILATION AND CURETTAGE, UTERUS fx;  Surgeon: Jose Canales Jr., MD;  Location: [facility] OR;  Service: OB/GYN;  Laterality: N/A;    ESOPHAGOGASTRODUODENOSCOPY N/A 2024    Procedure: EGD (ESOPHAGOGASTRODUODENOSCOPY);  Surgeon: Gorge Kolb MD;  Location: [facility] OR;  Service: General;  Laterality: N/A;    LAPAROSCOPIC SLEEVE GASTRECTOMY N/A 10/02/2024    Procedure: GASTRECTOMY, SLEEVE, LAPAROSCOPIC;  Surgeon: Gorge Kolb MD;  Location: [facility]  "OR;  Service: General;  Laterality: N/A;    SLEEVE GASTROPLASTY         Family History   Problem Relation Name Age of Onset    Hypothyroidism Mother      Heart murmur Mother      Heart failure Father      Cancer Father      No Known Problems Daughter      No Known Problems Son      No Known Problems Son      Colon cancer Maternal Grandmother Barb vega     Arthritis Maternal Grandmother Barb vega     Cancer Maternal Grandmother Barb vega     Breast cancer Other          Mother's sister       Social History     Socioeconomic History    Marital status: Single   Tobacco Use    Smoking status: Former     Current packs/day: 0.00     Average packs/day: 0.5 packs/day for 15.0 years (7.5 ttl pk-yrs)     Types: Cigarettes, Vaping with nicotine     Start date: 2006     Quit date: 2021     Years since quittin.5     Passive exposure: Past    Smokeless tobacco: Never    Tobacco comments:     Patient states "I stop smoking since ", stopping vaping since .    Substance and Sexual Activity    Alcohol use: Yes     Alcohol/week: 2.0 standard drinks of alcohol     Types: 2 Glasses of wine per week     Comment: occassionally    Drug use: Never    Sexual activity: Yes     Partners: Male     Birth control/protection: None     Social Drivers of Health     Financial Resource Strain: Low Risk  (10/2/2024)    Overall Financial Resource Strain (CARDIA)     Difficulty of Paying Living Expenses: Not hard at all   Food Insecurity: No Food Insecurity (10/2/2024)    Hunger Vital Sign     Worried About Running Out of Food in the Last Year: Never true     Ran Out of Food in the Last Year: Never true   Transportation Needs: No Transportation Needs (10/2/2024)    TRANSPORTATION NEEDS     Transportation : No   Stress: No Stress Concern Present (10/2/2024)    [employer] - Occupational Stress Questionnaire     Feeling of Stress : Not at all   Housing Stability: Unknown (10/2/2024)    " "Housing Stability Vital Sign     Unable to Pay for Housing in the Last Year: No     Homeless in the Last Year: No       Review of patient's allergies indicates:  No Known Allergies    OBJECTIVE:   Vital Signs:  Vitals:    07/02/25 0801   BP: 115/78   Pulse: 70   Resp: 10   Temp: 98.6 °F (37 °C)   TempSrc: Oral   Weight: 86.6 kg (191 lb)   Height: 5' 7" (1.702 m)       Recent Results (from the past 24 hours)   Prolactin    Collection Time: 07/02/25  8:48 AM   Result Value Ref Range    Prolactin Level 30.89 (H) 5.18 - 26.53 ng/mL   Basic Metabolic Panel    Collection Time: 07/02/25  8:48 AM   Result Value Ref Range    Sodium 142 136 - 145 mmol/L    Potassium 4.1 3.5 - 5.1 mmol/L    Chloride 109 (H) 98 - 107 mmol/L    CO2 27 22 - 29 mmol/L    Glucose 87 74 - 100 mg/dL    Blood Urea Nitrogen 9.5 7.0 - 18.7 mg/dL    Creatinine 0.71 0.55 - 1.02 mg/dL    BUN/Creatinine Ratio 13     Calcium 9.1 8.4 - 10.2 mg/dL    Anion Gap 6.0 mEq/L    eGFR >60 mL/min/1.73/m2   Vitamin D    Collection Time: 07/02/25  8:48 AM   Result Value Ref Range    Vitamin D 26 (L) 30 - 80 ng/mL         Physical Exam  Vitals reviewed.   Constitutional:       Appearance: Normal appearance.   HENT:      Head: Normocephalic and atraumatic.   Eyes:      General: No scleral icterus.     Extraocular Movements: Extraocular movements intact.      Conjunctiva/sclera: Conjunctivae normal.   Cardiovascular:      Rate and Rhythm: Normal rate and regular rhythm.      Pulses: Normal pulses.      Heart sounds: Normal heart sounds.   Pulmonary:      Effort: Pulmonary effort is normal.      Breath sounds: Normal breath sounds.   Abdominal:      General: Abdomen is flat. Bowel sounds are normal.      Palpations: Abdomen is soft.   Musculoskeletal:         General: No swelling.      Cervical back: Normal range of motion.   Skin:     General: Skin is warm and dry.      Capillary Refill: Capillary refill takes less than 2 seconds.   Neurological:      General: No focal " deficit present.      Mental Status: She is alert and oriented to person, place, and time.   Psychiatric:         Mood and Affect: Mood normal.         Behavior: Behavior normal.         Laboratory  CMP:   Recent Labs   Lab 09/13/24  1007 10/03/24  0419 12/05/24  0717 04/02/25  0941 07/02/25  0848   Sodium 140   < > 140 141 142   Potassium 4.0   < > 3.7 4.5 4.1   CO2 23   < > 21 L 30 H 27   Blood Urea Nitrogen 12.9   < > 9.9 7.0 9.5   Creatinine 0.77   < > 0.68 0.63 0.71   Calcium 9.1   < > 9.5 9.0 9.1   Albumin 3.6  --  3.7 3.8  --    Bilirubin Total 0.3  --  0.4 0.3  --    AST 15  --  14 15  --    ALT 12  --  14 10  --    ALP 73  --  57 62  --     < > = values in this interval not displayed.     CBC:   Recent Labs   Lab 10/03/24  0419 12/05/24  0717 04/02/25  0941   WBC 6.48 4.01 L 3.75 L   Neut # 4.79 2.15 2.17   RBC 3.79 L 4.66 4.11 L   Hgb 10.6 L 12.5 11.2 L   Hct 33.1 L 39.5 36.4 L   Platelet 211 259 271   MCV 87.3 84.8 88.6   RDW 14.2 14.2 12.5     FLP:   Recent Labs   Lab 05/18/23  0726 09/05/24  0644   Cholesterol Total 124 118   HDL Cholesterol 32 L 32 L   LDL Cholesterol 79.00 66.00   Triglyceride 64 100     DM:   Recent Labs   Lab 09/13/22  1359 11/10/22  0046 03/07/24  0749 09/05/24  0644 09/05/24  0651 09/13/24  1007 12/05/24  0717 04/02/25  0941 07/02/25  0848   Hemoglobin A1c 5.1  --  5.0  --   --   --   --   --   --    Urine Creatinine  --   --   --   --  87.8  --   --   --   --    Creatinine 0.60   < >  --    < >  --    < > 0.68 0.63 0.71    < > = values in this interval not displayed.     Thyroid:   Recent Labs   Lab 09/13/22  1359 06/18/24  0930 08/14/24  1630   TSH 0.6313 1.280 0.913   Thyroxine Free 0.79 0.77  --      Anemia:   Recent Labs   Lab 05/30/24  0700 09/05/24  0644 04/02/25  0941   Hgb 12.6   < > 11.2 L   Hct 40.1   < > 36.4 L   Iron Level 74   < > 30 L   Ferritin Level 39.21  --   --    Iron Binding Capacity Total 322   < > 288   Vitamin B12  --    < > 328    < > = values in this  interval not displayed.       Diagnostic Results:  Labs: Reviewed      ASSESSMENT & PLAN:     Ida was seen today for followup pituitary microadenoma.    Diagnoses and all orders for this visit:    Microprolactinoma  -patient has been off of cabergoline for over 6 months  -repeat BMP and prolactin levels  -consider resuming cabergoline, if levels are significantly elevated can consider repeat MRI of the pituitary  -MRI on 04/2025 shows slight increased size pituitary microadenoma with no involvement of the cavernous sinus or suprasellar extension.    Vitamin D deficiency  -28.9 on 8/2023  -repeat vitamin-D, consider supplementation.           Orders Placed This Encounter   Procedures    Prolactin     Standing Status:   Future     Number of Occurrences:   1     Expected Date:   7/2/2025     Expiration Date:   9/30/2026    Basic Metabolic Panel     Standing Status:   Future     Number of Occurrences:   1     Expected Date:   7/2/2025     Expiration Date:   9/30/2026     Send normal result to authorizing provider's In Basket if patient is active on MyChart::   Yes    Vitamin D     Standing Status:   Future     Number of Occurrences:   1     Expected Date:   7/2/2025     Expiration Date:   9/30/2026     Send normal result to authorizing provider's In Basket if patient is active on MyChart::   Yes         Discussed with Dr. Concepcion  - Staff Attestation to Follow    Jaky Bee DO

## 2025-07-03 RX ORDER — ERGOCALCIFEROL 1.25 MG/1
CAPSULE ORAL
Qty: 12 CAPSULE | Refills: 0 | Status: SHIPPED | OUTPATIENT
Start: 2025-07-03

## 2025-07-03 RX ORDER — CABERGOLINE 0.5 MG/1
TABLET ORAL
Qty: 12 TABLET | Refills: 3 | Status: SHIPPED | OUTPATIENT
Start: 2025-07-03

## 2025-07-03 NOTE — TELEPHONE ENCOUNTER
Spoke with patient informed labs show her Vit D is low and her prolactin is elevated, Dr. Concepcion would like to start her on Ergocalciferol 50,000iu twice weekly and Cabergoline 0.5mg half a tablet twice weekly with repeat labs prior to August appointment.

## 2025-07-08 DIAGNOSIS — D35.2 MICROPROLACTINOMA: ICD-10-CM

## 2025-07-08 DIAGNOSIS — E55.9 VITAMIN D DEFICIENCY: ICD-10-CM

## 2025-07-08 RX ORDER — ERGOCALCIFEROL 1.25 MG/1
CAPSULE ORAL
Qty: 8 CAPSULE | Refills: 1 | OUTPATIENT
Start: 2025-07-08

## 2025-07-08 NOTE — TELEPHONE ENCOUNTER
----- Message from Cece sent at 7/8/2025 10:25 AM CDT -----  Patient of Dr Concepcion     Left voicemail     Patient requesting call back regarding Vit D     Please contact patient when available    [phone]    10:30a    Thanks

## 2025-07-08 NOTE — TELEPHONE ENCOUNTER
Dr. Concepcion, a refill was requested by pharmacy, after reviewing labs we sent in new erx for ergocalciferol and cabergoline.  So this is a duplicate.

## 2025-07-08 NOTE — TELEPHONE ENCOUNTER
----- Message from Cece sent at 7/8/2025 10:25 AM CDT -----  Patient of Dr Concepcion     Left voicemail     Patient requesting call back regarding Vit D     Please contact patient when available    [phone]    10:30a    Thanks    Phoned spoke with patient she states pharmacy only gave her a partial fill on her vitamin D, call placed to pharmacy () pharmacy currently closed for lunch will attempt call again.

## 2025-07-09 NOTE — TELEPHONE ENCOUNTER
Phoned pharmacy () to sort why patient is only getting a partial fill on her ergo.  Call continually transferred to voice mailbox.

## 2025-07-11 NOTE — TELEPHONE ENCOUNTER
I called and spoke with pt who stated she received #8 on 7/8/2025. Pharmacy will only fill 1 month at a time which is #8, Pt will need to contact pharmacy once she is needing a refill

## 2025-07-11 NOTE — TELEPHONE ENCOUNTER
ergocalciferol (vitamin D2)     Dispensed Days Supply Quantity Provider Pharmacy   VITAMIN D2 1.25 MG(50,000 UNIT) 07/08/2025 30 8 each David Concepcion MD [facility] #[id number]

## 2025-08-11 DIAGNOSIS — D35.2 MICROPROLACTINOMA: ICD-10-CM

## 2025-08-11 DIAGNOSIS — E55.9 VITAMIN D DEFICIENCY: ICD-10-CM

## 2025-08-12 RX ORDER — ERGOCALCIFEROL 1.25 MG/1
CAPSULE ORAL
Qty: 8 CAPSULE | Refills: 1 | OUTPATIENT
Start: 2025-08-12

## (undated) DEVICE — NDL GRANEE OPEN LOOP GRASPER

## (undated) DEVICE — SEALANT VISTASEAL FIBRIN 10ML

## (undated) DEVICE — CLOSURE SKIN STERI STRIP 1/2X4

## (undated) DEVICE — COVER TABLE HVY DTY 60X90IN

## (undated) DEVICE — GAUZE SPONGE XRAY 4X4

## (undated) DEVICE — SUPPORT ULNA NERVE PROTECTOR

## (undated) DEVICE — GLOVE PROTEXIS LTX MICRO 8

## (undated) DEVICE — SUT VICRYL PLUS 4-0 FS-2 27IN

## (undated) DEVICE — PAD PREP CUFFED NS 24X48IN

## (undated) DEVICE — MANIFOLD 4 PORT

## (undated) DEVICE — Device

## (undated) DEVICE — GLOVE SIGNATURE MICRO LTX 7

## (undated) DEVICE — SOL IRRI STRL WATER 1000ML

## (undated) DEVICE — GLOVE SENSICARE PI GRN 7.5

## (undated) DEVICE — TOWEL OR DISP STRL BLUE 4/PK

## (undated) DEVICE — RELOAD ECHELON FLEX BLU 60MM

## (undated) DEVICE — RELOAD ECHELON FLEX GRN 60MM

## (undated) DEVICE — COVER PROXIMA MAYO STAND

## (undated) DEVICE — GOWN POLY REINF BRTH SLV XL

## (undated) DEVICE — SHEARS HARMONIC ADV CRV 45CM

## (undated) DEVICE — CLIPPER BLADE MOD 4406 (CAREF)

## (undated) DEVICE — BINDER ABD 12IN LG 63-74IN

## (undated) DEVICE — IRRIGATOR HYDRO-SURG PLUS 5MM

## (undated) DEVICE — TROCAR ENDOPATH XCEL 15MM 10CM

## (undated) DEVICE — TRAY SKIN SCRUB WET PREMIUM

## (undated) DEVICE — POSITIONER HEEL FOAM CONVOLTD

## (undated) DEVICE — CLIP ENDO LIGATION LARGE CLIPS

## (undated) DEVICE — DRAPE UNDER BUTTOCKS SUC PORT

## (undated) DEVICE — DRAPE UTILITY STRL 15X26IN

## (undated) DEVICE — SUT VICRYL+ 27 UR-6 VIOL

## (undated) DEVICE — NDL SAFETY 21G X 1 IN ECLIPSE

## (undated) DEVICE — MATTRESS HOVERMAT TRNSF 34X78

## (undated) DEVICE — APPLICATOR CHLORAPREP ORN 26ML

## (undated) DEVICE — CANNULA ENDOPATH XCEL 5X100MM

## (undated) DEVICE — TROCAR ENDOPATH XCEL 5X100MM

## (undated) DEVICE — TROCAR LAPSCP XCEL 12MM 10CM

## (undated) DEVICE — ADHESIVE MASTISOL VIAL 48/BX

## (undated) DEVICE — GLOVE SENSICARE NEOPRENE 7

## (undated) DEVICE — SOL NORMAL USPCA 0.9%

## (undated) DEVICE — SYR 10CC LUER LOCK

## (undated) DEVICE — SCISSOR CURVED ENDOPATH 5MM

## (undated) DEVICE — POSITIONER IV ARMBOARD FOAM

## (undated) DEVICE — SOL NACL IRR 1000ML BTL

## (undated) DEVICE — DRESSING TELFA N ADH 3X8

## (undated) DEVICE — HANDLE DEVON RIGID OR LIGHT

## (undated) DEVICE — TROCAR ENDOPATH XCEL 11MM 10CM

## (undated) DEVICE — KIT LAPAROSCOPY UNIVERSITY

## (undated) DEVICE — KIT SURGICAL TURNOVER

## (undated) DEVICE — APPLICATOR VISTASEAL RIG 35CM

## (undated) DEVICE — STAPLER ECHELON LONG 60X440MM

## (undated) DEVICE — PAD CURITY MATERNITY PERI

## (undated) DEVICE — ELECTRODE PATIENT RETURN DISP

## (undated) DEVICE — GLOVE SIGNATURE MICRO LTX 7.5